# Patient Record
Sex: MALE | Race: ASIAN | NOT HISPANIC OR LATINO | URBAN - METROPOLITAN AREA
[De-identification: names, ages, dates, MRNs, and addresses within clinical notes are randomized per-mention and may not be internally consistent; named-entity substitution may affect disease eponyms.]

---

## 2017-01-01 ENCOUNTER — EMERGENCY (EMERGENCY)
Facility: HOSPITAL | Age: 52
LOS: 1 days | Discharge: HOME | End: 2017-01-01
Attending: EMERGENCY MEDICINE | Admitting: EMERGENCY MEDICINE
Payer: COMMERCIAL

## 2017-01-01 VITALS
SYSTOLIC BLOOD PRESSURE: 101 MMHG | WEIGHT: 151.9 LBS | DIASTOLIC BLOOD PRESSURE: 70 MMHG | HEIGHT: 63 IN | HEART RATE: 63 BPM | RESPIRATION RATE: 18 BRPM | OXYGEN SATURATION: 99 % | TEMPERATURE: 97 F

## 2017-01-01 DIAGNOSIS — Z98.89 OTHER SPECIFIED POSTPROCEDURAL STATES: Chronic | ICD-10-CM

## 2017-01-01 DIAGNOSIS — Z90.49 ACQUIRED ABSENCE OF OTHER SPECIFIED PARTS OF DIGESTIVE TRACT: Chronic | ICD-10-CM

## 2017-01-01 DIAGNOSIS — Z90.89 ACQUIRED ABSENCE OF OTHER ORGANS: Chronic | ICD-10-CM

## 2017-01-01 LAB
ALBUMIN SERPL ELPH-MCNC: 3.5 G/DL — SIGNIFICANT CHANGE UP (ref 3.3–5)
ALP SERPL-CCNC: 56 U/L — SIGNIFICANT CHANGE UP (ref 40–120)
ALT FLD-CCNC: 13 U/L — SIGNIFICANT CHANGE UP (ref 4–41)
AST SERPL-CCNC: 13 U/L — SIGNIFICANT CHANGE UP (ref 4–40)
BASE EXCESS BLDV CALC-SCNC: 6.6 MMOL/L — SIGNIFICANT CHANGE UP
BASOPHILS # BLD AUTO: 0.02 K/UL — SIGNIFICANT CHANGE UP (ref 0–0.2)
BASOPHILS NFR BLD AUTO: 0.3 % — SIGNIFICANT CHANGE UP (ref 0–2)
BILIRUB SERPL-MCNC: 0.2 MG/DL — SIGNIFICANT CHANGE UP (ref 0.2–1.2)
BLOOD GAS VENOUS - CREATININE: 0.62 MG/DL — SIGNIFICANT CHANGE UP (ref 0.5–1.3)
BUN SERPL-MCNC: 8 MG/DL — SIGNIFICANT CHANGE UP (ref 7–23)
CALCIUM SERPL-MCNC: 8.7 MG/DL — SIGNIFICANT CHANGE UP (ref 8.4–10.5)
CHLORIDE BLDV-SCNC: 107 MMOL/L — SIGNIFICANT CHANGE UP (ref 96–108)
CHLORIDE SERPL-SCNC: 105 MMOL/L — SIGNIFICANT CHANGE UP (ref 98–107)
CO2 SERPL-SCNC: 27 MMOL/L — SIGNIFICANT CHANGE UP (ref 22–31)
CREAT SERPL-MCNC: 0.7 MG/DL — SIGNIFICANT CHANGE UP (ref 0.5–1.3)
EOSINOPHIL # BLD AUTO: 0.09 K/UL — SIGNIFICANT CHANGE UP (ref 0–0.5)
EOSINOPHIL NFR BLD AUTO: 1.5 % — SIGNIFICANT CHANGE UP (ref 0–6)
GAS PNL BLDV: 139 MMOL/L — SIGNIFICANT CHANGE UP (ref 136–146)
GLUCOSE BLDV-MCNC: 113 — HIGH (ref 70–99)
GLUCOSE SERPL-MCNC: 122 MG/DL — HIGH (ref 70–99)
HCO3 BLDV-SCNC: 28 MMOL/L — HIGH (ref 20–27)
HCT VFR BLD CALC: 42.6 % — SIGNIFICANT CHANGE UP (ref 39–50)
HCT VFR BLDV CALC: 47.1 % — SIGNIFICANT CHANGE UP (ref 39–51)
HGB BLD-MCNC: 14.6 G/DL — SIGNIFICANT CHANGE UP (ref 13–17)
HGB BLDV-MCNC: 15.4 G/DL — SIGNIFICANT CHANGE UP (ref 13–17)
IMM GRANULOCYTES NFR BLD AUTO: 0.2 % — SIGNIFICANT CHANGE UP (ref 0–1.5)
LACTATE BLDV-MCNC: 0.8 MMOL/L — SIGNIFICANT CHANGE UP (ref 0.5–2)
LIDOCAIN IGE QN: 74.3 U/L — HIGH (ref 7–60)
LYMPHOCYTES # BLD AUTO: 2.96 K/UL — SIGNIFICANT CHANGE UP (ref 1–3.3)
LYMPHOCYTES # BLD AUTO: 48.3 % — HIGH (ref 13–44)
MCHC RBC-ENTMCNC: 28.6 PG — SIGNIFICANT CHANGE UP (ref 27–34)
MCHC RBC-ENTMCNC: 34.3 % — SIGNIFICANT CHANGE UP (ref 32–36)
MCV RBC AUTO: 83.5 FL — SIGNIFICANT CHANGE UP (ref 80–100)
MONOCYTES # BLD AUTO: 0.49 K/UL — SIGNIFICANT CHANGE UP (ref 0–0.9)
MONOCYTES NFR BLD AUTO: 8 % — SIGNIFICANT CHANGE UP (ref 2–14)
NEUTROPHILS # BLD AUTO: 2.56 K/UL — SIGNIFICANT CHANGE UP (ref 1.8–7.4)
NEUTROPHILS NFR BLD AUTO: 41.7 % — LOW (ref 43–77)
PCO2 BLDV: 55 MMHG — HIGH (ref 41–51)
PH BLDV: 7.38 PH — SIGNIFICANT CHANGE UP (ref 7.32–7.43)
PLATELET # BLD AUTO: 179 K/UL — SIGNIFICANT CHANGE UP (ref 150–400)
PMV BLD: 9.6 FL — SIGNIFICANT CHANGE UP (ref 7–13)
PO2 BLDV: 31 MMHG — LOW (ref 35–40)
POTASSIUM BLDV-SCNC: 3.5 MMOL/L — SIGNIFICANT CHANGE UP (ref 3.4–4.5)
POTASSIUM SERPL-MCNC: 3.8 MMOL/L — SIGNIFICANT CHANGE UP (ref 3.5–5.3)
POTASSIUM SERPL-SCNC: 3.8 MMOL/L — SIGNIFICANT CHANGE UP (ref 3.5–5.3)
PROT SERPL-MCNC: 6.1 G/DL — SIGNIFICANT CHANGE UP (ref 6–8.3)
RBC # BLD: 5.1 M/UL — SIGNIFICANT CHANGE UP (ref 4.2–5.8)
RBC # FLD: 13.3 % — SIGNIFICANT CHANGE UP (ref 10.3–14.5)
SAO2 % BLDV: 52.4 % — LOW (ref 60–85)
SODIUM SERPL-SCNC: 144 MMOL/L — SIGNIFICANT CHANGE UP (ref 135–145)
WBC # BLD: 6.13 K/UL — SIGNIFICANT CHANGE UP (ref 3.8–10.5)
WBC # FLD AUTO: 6.13 K/UL — SIGNIFICANT CHANGE UP (ref 3.8–10.5)

## 2017-01-01 PROCEDURE — 93010 ELECTROCARDIOGRAM REPORT: CPT

## 2017-01-01 PROCEDURE — 99285 EMERGENCY DEPT VISIT HI MDM: CPT | Mod: 25

## 2017-01-01 RX ORDER — SODIUM CHLORIDE 9 MG/ML
1000 INJECTION INTRAMUSCULAR; INTRAVENOUS; SUBCUTANEOUS ONCE
Qty: 0 | Refills: 0 | Status: COMPLETED | OUTPATIENT
Start: 2017-01-01 | End: 2017-01-01

## 2017-01-01 RX ORDER — ONDANSETRON 8 MG/1
4 TABLET, FILM COATED ORAL ONCE
Qty: 0 | Refills: 0 | Status: COMPLETED | OUTPATIENT
Start: 2017-01-01 | End: 2017-01-01

## 2017-01-01 RX ORDER — MORPHINE SULFATE 50 MG/1
4 CAPSULE, EXTENDED RELEASE ORAL ONCE
Qty: 0 | Refills: 0 | Status: DISCONTINUED | OUTPATIENT
Start: 2017-01-01 | End: 2017-01-01

## 2017-01-01 RX ORDER — HYDROMORPHONE HYDROCHLORIDE 2 MG/ML
1 INJECTION INTRAMUSCULAR; INTRAVENOUS; SUBCUTANEOUS ONCE
Qty: 0 | Refills: 0 | Status: DISCONTINUED | OUTPATIENT
Start: 2017-01-01 | End: 2017-01-01

## 2017-01-01 RX ADMIN — SODIUM CHLORIDE 1000 MILLILITER(S): 9 INJECTION INTRAMUSCULAR; INTRAVENOUS; SUBCUTANEOUS at 21:45

## 2017-01-01 RX ADMIN — ONDANSETRON 4 MILLIGRAM(S): 8 TABLET, FILM COATED ORAL at 22:10

## 2017-01-01 RX ADMIN — MORPHINE SULFATE 4 MILLIGRAM(S): 50 CAPSULE, EXTENDED RELEASE ORAL at 22:30

## 2017-01-01 RX ADMIN — MORPHINE SULFATE 4 MILLIGRAM(S): 50 CAPSULE, EXTENDED RELEASE ORAL at 23:52

## 2017-01-01 RX ADMIN — MORPHINE SULFATE 4 MILLIGRAM(S): 50 CAPSULE, EXTENDED RELEASE ORAL at 22:10

## 2017-01-01 RX ADMIN — MORPHINE SULFATE 4 MILLIGRAM(S): 50 CAPSULE, EXTENDED RELEASE ORAL at 23:25

## 2017-01-01 NOTE — ED ADULT NURSE NOTE - OBJECTIVE STATEMENT
Pt a&ox3 c/o Rt sided abdomen pain radiating across lower abdomen. Pt reports n/v for the past two days. Pt reports having back pain unrelated to abdominal pain. Pt denies sob breathing even and unlabored resp. Pt denies cp/discomfort, denies headache/dizziness. Abdomen is soft, non-tender, non-distended, denies dysuria/hematuria, having normal BM's. Skin is cool dry and intact. IV lock placed, labs drawn and sent as ordered. Report given to primary RN Lara.

## 2017-01-01 NOTE — ED ADULT TRIAGE NOTE - CHIEF COMPLAINT QUOTE
Pt st"I have stomach pains for 2 days now and vomiting also I have bad headache...and pain that comes and goes in lower back. " Hx Gastric Sleeve june 2016. DM QMA=[id number] Pt st"I have stomach pains for 2 days now and vomiting also I have bad headache...and pain that comes and goes in back. " Hx Gastric Sleeve june 2016. DM [id number]=[id number]

## 2017-01-01 NOTE — ED PROVIDER NOTE - CONDUCTED A DETAILED DISCUSSION WITH PATIENT AND/OR GUARDIAN REGARDING, MDM
need for outpatient follow-up/lab results/radiology results/return to ED if symptoms worsen, persist or questions arise

## 2017-01-01 NOTE — ED ADULT NURSE NOTE - CHIEF COMPLAINT QUOTE
Pt st"I have stomach pains for 2 days now and vomiting also I have bad headache...and pain that comes and goes in back. " Hx Gastric Sleeve june 2016. DM [id number]

## 2017-01-01 NOTE — ED ADULT TRIAGE NOTE - CCCP TRG CHIEF CMPLNT
abdominal pain/vomiting abdominal pain/epigastic back pain and vomiting abdominal pain/epigastic back pain and vomiting r/oSBO

## 2017-01-01 NOTE — ED PROVIDER NOTE - OBJECTIVE STATEMENT
51M h/o DM, obesity, gastric sleeve June 2016 (Dr Lafleur) p/w periumbilical and R-sided abdominal pain x 2 days. The pain is constant, radiates to R flank, associated with NBNB emesis worse with food. No fevers/chills, CP, SOB, cough. No dysuria or hematuria, however pt states there are "bubbles" in his urine. Last BM today, passing gas. 51M h/o DM, obesity, gastric sleeve June 2016 (Dr Lafleur) p/w periumbilical and R-sided abdominal pain x 2 days. The pain is constant, radiates to R flank, associated with NBNB emesis worse with food. No fevers/chills, CP, SOB, cough. No dysuria or hematuria. Last BM today, passing gas.

## 2017-01-01 NOTE — ED PROVIDER NOTE - ATTENDING CONTRIBUTION TO CARE
Shilo: 51 yom with DM, c/p gastric sleeve 7 months ago c/o right flank pain for 2 weeks intermittent with right sided abd pain for 2 days with nausea and vomiting, no distention, "feels like I'm carrying 15 piunds on my stomach". No cp, no sob, no fever, no diarrhea, +passing gas.  On exam, appears uncomfortable, clear lungs, nml cardiac, +BS (decreased) tn throughout right side, mild right cvat.  Discussed with surgery (bariatric) CT here and then reassess, pain meds, labs, fluids, CT.

## 2017-01-01 NOTE — ED ADULT NURSE NOTE - PMH
Asthma    Asthma  - intubated x 2  - in 1996 - admitted in 2011 for asthma - on singulair , zyflo and advair    Diabetes mellitus    DM (diabetes mellitus)    H/O: Obesity    Hypertension  pt denies but is listed on Medical clearance  Seasonal Allergies on singulair    Sleep Apnea has sleep studies in 2010  - was given CPAP machine but does not use it

## 2017-01-01 NOTE — ED PROVIDER NOTE - PLAN OF CARE
-- Follow up with Dr Lafleur in 1-2 days. Call today to make an appointment. Bring a copy of your lab and imaging results from today.  -- Take tylenol or motrin as needed for mild to moderate pain.  Take oxycodone 1 tablet every 4-6 hours as needed for severe pain not responsive to tylenol or motrin. Do not drink, drive, operate machinery, climb ladders or perform any other high risk activities after taking oxycodone.   -- Return to ER immediately for new or worsening symptoms (including but not limited to: worsening pain, vomiting, inability to eat and drink, inability to pass gas or have a bowel movement) or for any concerns. -- Follow up with Dr Lafleur in 1-2 days. Call today to make an appointment. Bring a copy of your lab and imaging results from today.  -- Take tylenol as needed for mild to moderate pain.  Take oxycodone 1 tablet every 4-6 hours as needed for severe pain not responsive to tylenol. Do not drink, drive, operate machinery, climb ladders or perform any other high risk activities after taking oxycodone.   -- Return to ER immediately for new or worsening symptoms (including but not limited to: worsening pain, vomiting, inability to eat and drink, inability to pass gas or have a bowel movement) or for any concerns.

## 2017-01-01 NOTE — ED PROVIDER NOTE - CARE PLAN
Principal Discharge DX:	Abdominal pain  Instructions for follow-up, activity and diet:	-- Follow up with Dr Lafleur in 1-2 days. Call today to make an appointment. Bring a copy of your lab and imaging results from today.  -- Take tylenol or motrin as needed for mild to moderate pain.  Take oxycodone 1 tablet every 4-6 hours as needed for severe pain not responsive to tylenol or motrin. Do not drink, drive, operate machinery, climb ladders or perform any other high risk activities after taking oxycodone.   -- Return to ER immediately for new or worsening symptoms (including but not limited to: worsening pain, vomiting, inability to eat and drink, inability to pass gas or have a bowel movement) or for any concerns. Principal Discharge DX:	Abdominal pain  Instructions for follow-up, activity and diet:	-- Follow up with Dr Lafleur in 1-2 days. Call today to make an appointment. Bring a copy of your lab and imaging results from today.  -- Take tylenol as needed for mild to moderate pain.  Take oxycodone 1 tablet every 4-6 hours as needed for severe pain not responsive to tylenol. Do not drink, drive, operate machinery, climb ladders or perform any other high risk activities after taking oxycodone.   -- Return to ER immediately for new or worsening symptoms (including but not limited to: worsening pain, vomiting, inability to eat and drink, inability to pass gas or have a bowel movement) or for any concerns.

## 2017-01-01 NOTE — ED PROVIDER NOTE - PROGRESS NOTE DETAILS
Pt with pruritus, will give benadryl. CTAP neg for acute process or bariatric complication. Discussed with surgery. Will obtain RUQ US, reassess. RUQ US negative. Spoke to surgery resident, who discussed plan with pt's bariatric surgeon (at [place]) around 2am. As per Dr Lafleur via surgery resident, pt can be discharged to follow up in office if ultrasound negative. Discussed with pt, who agrees with plan. Gave strict return precautions. Pt feeling better, tolerating PO, pain improved.

## 2017-01-01 NOTE — ED PROVIDER NOTE - MEDICAL DECISION MAKING DETAILS
51M h/o gastric sleeve 6 months ago p/w abdominal pain and vomiting. Concern for bariatric complication, SBO, pancreatitis, kwasi. Plan: labs, fluids, pain control, CTAP, bariatric consult.

## 2017-01-01 NOTE — ED PROVIDER NOTE - PSH
h/o  Sinus Surgery - amny yrs ago and septoplasty    History of Appendectomy    Inguinal Hernia right - many yrs ago    S/P appendectomy    S/P hernia repair    S/P tonsillectomy

## 2017-01-02 VITALS
RESPIRATION RATE: 20 BRPM | HEART RATE: 80 BPM | DIASTOLIC BLOOD PRESSURE: 75 MMHG | SYSTOLIC BLOOD PRESSURE: 101 MMHG | OXYGEN SATURATION: 96 %

## 2017-01-02 PROCEDURE — 76705 ECHO EXAM OF ABDOMEN: CPT | Mod: 26

## 2017-01-02 PROCEDURE — 74177 CT ABD & PELVIS W/CONTRAST: CPT | Mod: 26

## 2017-01-02 RX ORDER — HYDROMORPHONE HYDROCHLORIDE 2 MG/ML
0.5 INJECTION INTRAMUSCULAR; INTRAVENOUS; SUBCUTANEOUS ONCE
Qty: 0 | Refills: 0 | Status: DISCONTINUED | OUTPATIENT
Start: 2017-01-02 | End: 2017-01-02

## 2017-01-02 RX ORDER — DIPHENHYDRAMINE HCL 50 MG
25 CAPSULE ORAL ONCE
Qty: 0 | Refills: 0 | Status: COMPLETED | OUTPATIENT
Start: 2017-01-02 | End: 2017-01-02

## 2017-01-02 RX ORDER — ONDANSETRON 8 MG/1
4 TABLET, FILM COATED ORAL ONCE
Qty: 0 | Refills: 0 | Status: COMPLETED | OUTPATIENT
Start: 2017-01-02 | End: 2017-01-02

## 2017-01-02 RX ORDER — OXYCODONE HYDROCHLORIDE 5 MG/1
1 TABLET ORAL
Qty: 6 | Refills: 0 | OUTPATIENT
Start: 2017-01-02

## 2017-01-02 RX ADMIN — HYDROMORPHONE HYDROCHLORIDE 0.5 MILLIGRAM(S): 2 INJECTION INTRAMUSCULAR; INTRAVENOUS; SUBCUTANEOUS at 03:16

## 2017-01-02 RX ADMIN — HYDROMORPHONE HYDROCHLORIDE 1 MILLIGRAM(S): 2 INJECTION INTRAMUSCULAR; INTRAVENOUS; SUBCUTANEOUS at 00:01

## 2017-01-02 RX ADMIN — ONDANSETRON 4 MILLIGRAM(S): 8 TABLET, FILM COATED ORAL at 00:27

## 2017-01-02 RX ADMIN — HYDROMORPHONE HYDROCHLORIDE 1 MILLIGRAM(S): 2 INJECTION INTRAMUSCULAR; INTRAVENOUS; SUBCUTANEOUS at 00:28

## 2017-01-02 RX ADMIN — Medication 25 MILLIGRAM(S): at 02:46

## 2017-01-02 RX ADMIN — Medication 25 MILLIGRAM(S): at 01:18

## 2017-01-02 RX ADMIN — HYDROMORPHONE HYDROCHLORIDE 0.5 MILLIGRAM(S): 2 INJECTION INTRAMUSCULAR; INTRAVENOUS; SUBCUTANEOUS at 03:40

## 2017-01-02 NOTE — ED ADULT NURSE REASSESSMENT NOTE - CONDITION
Pt. c/o itchiness. IV fell out. Restarted in left A/F #20 S/L. Benadryl 25mg given as ordered. c/o abdominal pain.

## 2017-01-02 NOTE — ED ADULT NURSE REASSESSMENT NOTE - NS ED NURSE REASSESS COMMENT FT1
returned from ct scan. states started to feel itchy after was given Iv contrast. no hives to body. denies chest pain, sob, throat closing, or other symptoms. MD Knott made aware. given benadryl as per order.

## 2017-02-15 ENCOUNTER — APPOINTMENT (OUTPATIENT)
Dept: PULMONOLOGY | Facility: CLINIC | Age: 52
End: 2017-02-15

## 2017-04-07 ENCOUNTER — EMERGENCY (EMERGENCY)
Facility: HOSPITAL | Age: 52
LOS: 1 days | Discharge: HOME | End: 2017-04-07
Attending: EMERGENCY MEDICINE | Admitting: EMERGENCY MEDICINE
Payer: COMMERCIAL

## 2017-04-07 VITALS — DIASTOLIC BLOOD PRESSURE: 65 MMHG | SYSTOLIC BLOOD PRESSURE: 102 MMHG

## 2017-04-07 VITALS
DIASTOLIC BLOOD PRESSURE: 73 MMHG | OXYGEN SATURATION: 99 % | RESPIRATION RATE: 16 BRPM | HEART RATE: 75 BPM | SYSTOLIC BLOOD PRESSURE: 114 MMHG | TEMPERATURE: 99 F

## 2017-04-07 DIAGNOSIS — Z98.89 OTHER SPECIFIED POSTPROCEDURAL STATES: Chronic | ICD-10-CM

## 2017-04-07 DIAGNOSIS — Z90.49 ACQUIRED ABSENCE OF OTHER SPECIFIED PARTS OF DIGESTIVE TRACT: Chronic | ICD-10-CM

## 2017-04-07 DIAGNOSIS — Z90.89 ACQUIRED ABSENCE OF OTHER ORGANS: Chronic | ICD-10-CM

## 2017-04-07 LAB
ALBUMIN SERPL ELPH-MCNC: 3.4 G/DL — SIGNIFICANT CHANGE UP (ref 3.3–5)
ALP SERPL-CCNC: 56 U/L — SIGNIFICANT CHANGE UP (ref 40–120)
ALT FLD-CCNC: 20 U/L — SIGNIFICANT CHANGE UP (ref 4–41)
AST SERPL-CCNC: 17 U/L — SIGNIFICANT CHANGE UP (ref 4–40)
BASOPHILS # BLD AUTO: 0.03 K/UL — SIGNIFICANT CHANGE UP (ref 0–0.2)
BASOPHILS NFR BLD AUTO: 0.5 % — SIGNIFICANT CHANGE UP (ref 0–2)
BILIRUB SERPL-MCNC: 0.3 MG/DL — SIGNIFICANT CHANGE UP (ref 0.2–1.2)
BUN SERPL-MCNC: 7 MG/DL — SIGNIFICANT CHANGE UP (ref 7–23)
CALCIUM SERPL-MCNC: 8.9 MG/DL — SIGNIFICANT CHANGE UP (ref 8.4–10.5)
CHLORIDE SERPL-SCNC: 102 MMOL/L — SIGNIFICANT CHANGE UP (ref 98–107)
CK MB BLD-MCNC: 1.32 NG/ML — SIGNIFICANT CHANGE UP (ref 1–6.6)
CK MB BLD-MCNC: SIGNIFICANT CHANGE UP (ref 0–2.5)
CK SERPL-CCNC: 96 U/L — SIGNIFICANT CHANGE UP (ref 30–200)
CO2 SERPL-SCNC: 26 MMOL/L — SIGNIFICANT CHANGE UP (ref 22–31)
CREAT SERPL-MCNC: 0.58 MG/DL — SIGNIFICANT CHANGE UP (ref 0.5–1.3)
EOSINOPHIL # BLD AUTO: 0.12 K/UL — SIGNIFICANT CHANGE UP (ref 0–0.5)
EOSINOPHIL NFR BLD AUTO: 2.1 % — SIGNIFICANT CHANGE UP (ref 0–6)
GLUCOSE SERPL-MCNC: 84 MG/DL — SIGNIFICANT CHANGE UP (ref 70–99)
HCT VFR BLD CALC: 40.9 % — SIGNIFICANT CHANGE UP (ref 39–50)
HGB BLD-MCNC: 14.1 G/DL — SIGNIFICANT CHANGE UP (ref 13–17)
IMM GRANULOCYTES NFR BLD AUTO: 0.2 % — SIGNIFICANT CHANGE UP (ref 0–1.5)
LYMPHOCYTES # BLD AUTO: 3.11 K/UL — SIGNIFICANT CHANGE UP (ref 1–3.3)
LYMPHOCYTES # BLD AUTO: 54.4 % — HIGH (ref 13–44)
MCHC RBC-ENTMCNC: 28.8 PG — SIGNIFICANT CHANGE UP (ref 27–34)
MCHC RBC-ENTMCNC: 34.5 % — SIGNIFICANT CHANGE UP (ref 32–36)
MCV RBC AUTO: 83.5 FL — SIGNIFICANT CHANGE UP (ref 80–100)
MONOCYTES # BLD AUTO: 0.43 K/UL — SIGNIFICANT CHANGE UP (ref 0–0.9)
MONOCYTES NFR BLD AUTO: 7.5 % — SIGNIFICANT CHANGE UP (ref 2–14)
NEUTROPHILS # BLD AUTO: 2.02 K/UL — SIGNIFICANT CHANGE UP (ref 1.8–7.4)
NEUTROPHILS NFR BLD AUTO: 35.3 % — LOW (ref 43–77)
PLATELET # BLD AUTO: 169 K/UL — SIGNIFICANT CHANGE UP (ref 150–400)
PMV BLD: 9.7 FL — SIGNIFICANT CHANGE UP (ref 7–13)
POTASSIUM SERPL-MCNC: 3.6 MMOL/L — SIGNIFICANT CHANGE UP (ref 3.5–5.3)
POTASSIUM SERPL-SCNC: 3.6 MMOL/L — SIGNIFICANT CHANGE UP (ref 3.5–5.3)
PROT SERPL-MCNC: 6 G/DL — SIGNIFICANT CHANGE UP (ref 6–8.3)
RBC # BLD: 4.9 M/UL — SIGNIFICANT CHANGE UP (ref 4.2–5.8)
RBC # FLD: 13.3 % — SIGNIFICANT CHANGE UP (ref 10.3–14.5)
SODIUM SERPL-SCNC: 143 MMOL/L — SIGNIFICANT CHANGE UP (ref 135–145)
TROPONIN T SERPL-MCNC: < 0.06 NG/ML — SIGNIFICANT CHANGE UP (ref 0–0.06)
WBC # BLD: 5.72 K/UL — SIGNIFICANT CHANGE UP (ref 3.8–10.5)
WBC # FLD AUTO: 5.72 K/UL — SIGNIFICANT CHANGE UP (ref 3.8–10.5)

## 2017-04-07 PROCEDURE — 99285 EMERGENCY DEPT VISIT HI MDM: CPT

## 2017-04-07 RX ORDER — FAMOTIDINE 10 MG/ML
20 INJECTION INTRAVENOUS ONCE
Qty: 0 | Refills: 0 | Status: COMPLETED | OUTPATIENT
Start: 2017-04-07 | End: 2017-04-07

## 2017-04-07 RX ORDER — SODIUM CHLORIDE 9 MG/ML
1000 INJECTION INTRAMUSCULAR; INTRAVENOUS; SUBCUTANEOUS ONCE
Qty: 0 | Refills: 0 | Status: COMPLETED | OUTPATIENT
Start: 2017-04-07 | End: 2017-04-07

## 2017-04-07 RX ORDER — METOCLOPRAMIDE HCL 10 MG
10 TABLET ORAL ONCE
Qty: 0 | Refills: 0 | Status: COMPLETED | OUTPATIENT
Start: 2017-04-07 | End: 2017-04-07

## 2017-04-07 RX ADMIN — FAMOTIDINE 20 MILLIGRAM(S): 10 INJECTION INTRAVENOUS at 22:53

## 2017-04-07 RX ADMIN — SODIUM CHLORIDE 2000 MILLILITER(S): 9 INJECTION INTRAMUSCULAR; INTRAVENOUS; SUBCUTANEOUS at 22:54

## 2017-04-07 RX ADMIN — Medication 10 MILLIGRAM(S): at 22:54

## 2017-04-07 NOTE — ED ADULT TRIAGE NOTE - CHIEF COMPLAINT QUOTE
brought in by EMS from home for c/o left sided chest pain x 2 days with feelings of spinning of the room. C/o nausea/dry heaving x 2 hrs. Hx DM, asthma brought in by EMS from home for c/o left sided chest pain x 2 days with feelings of spinning of the room. C/o nausea/dry heaving x 2 hrs. Hx DM, asthma. FS=77

## 2017-04-07 NOTE — ED ADULT NURSE NOTE - OBJECTIVE STATEMENT
Patient a&ox4, ambulatory, complaining of headache followed by left sided chest discomfort radiating down arm and leg with numbness. Patient states s/s x 2 day. Patient noted to have general malaise upon assessment.  EKG done at triage, NSR on cardiac monitor. IV placed, labs drawn and sent to lab. Will continue to monitor.

## 2017-04-07 NOTE — ED ADULT NURSE NOTE - CHIEF COMPLAINT QUOTE
brought in by EMS from home for c/o left sided chest pain x 2 days with feelings of spinning of the room. C/o nausea/dry heaving x 2 hrs. Hx DM, asthma. FS=77

## 2017-04-07 NOTE — ED PROVIDER NOTE - MEDICAL DECISION MAKING DETAILS
Attending Note (Sarah): patient with headache and paresthesias.  concern for complex migraine.  will treat symptomatically.  cxr pending.

## 2017-04-07 NOTE — ED PROVIDER NOTE - OBJECTIVE STATEMENT
52yom w/ DM2, GERD p/w 2 days of headache and chest pain. Pt states he had a gradual onset of a bilateral frontal, aching headache with associated photophobia, unrelieved by tylenol. Pt also having left sided chest pressure radiating to the left arm associated with tingling in the left hand. 52yom w/ DM2, GERD p/w 2 days of headache and chest pain. Pt states he had a gradual onset of a bilateral frontal, aching headache with associated photophobia, unrelieved by tylenol. No history of headaches. Denies any focal weakness, facial droop, changes in speech. Has had nausea and dry-heaving since earlier today. Pt also having left sided chest pressure radiating to the left arm associated with tingling in the left hand. Chest pain has been intermittent. Worsens with inspiration and movement. Has never had similar pain. No hx of CAD. No family hx CAD. Lifetime non-smoker.

## 2017-04-07 NOTE — ED PROVIDER NOTE - PROGRESS NOTE DETAILS
Attending Note (Sarah): patient feels better, well appearing. Attending Note (Sarah): cxr on ed read negative for acute pathology

## 2017-04-08 PROCEDURE — 71020: CPT | Mod: 26

## 2017-04-08 NOTE — ED ADULT NURSE REASSESSMENT NOTE - NS ED NURSE REASSESS COMMENT FT1
Report received from OTONIEL Garvey at 12:35PM. Patient discharged by MD prior to nurse reelevation. Iv discontinued by attending. Discharge instructions were provided. Kerry FREDERICK

## 2018-05-10 ENCOUNTER — EMERGENCY (EMERGENCY)
Facility: HOSPITAL | Age: 53
LOS: 1 days | Discharge: HOME | End: 2018-05-10
Attending: EMERGENCY MEDICINE | Admitting: EMERGENCY MEDICINE
Payer: COMMERCIAL

## 2018-05-10 VITALS
SYSTOLIC BLOOD PRESSURE: 102 MMHG | TEMPERATURE: 98 F | DIASTOLIC BLOOD PRESSURE: 61 MMHG | OXYGEN SATURATION: 100 % | HEART RATE: 90 BPM | RESPIRATION RATE: 20 BRPM

## 2018-05-10 DIAGNOSIS — Z90.49 ACQUIRED ABSENCE OF OTHER SPECIFIED PARTS OF DIGESTIVE TRACT: Chronic | ICD-10-CM

## 2018-05-10 DIAGNOSIS — Z90.89 ACQUIRED ABSENCE OF OTHER ORGANS: Chronic | ICD-10-CM

## 2018-05-10 DIAGNOSIS — Z98.89 OTHER SPECIFIED POSTPROCEDURAL STATES: Chronic | ICD-10-CM

## 2018-05-10 LAB
ALBUMIN SERPL ELPH-MCNC: 3.7 G/DL — SIGNIFICANT CHANGE UP (ref 3.3–5)
ALP SERPL-CCNC: 79 U/L — SIGNIFICANT CHANGE UP (ref 40–120)
ALT FLD-CCNC: 18 U/L — SIGNIFICANT CHANGE UP (ref 4–41)
AST SERPL-CCNC: 21 U/L — SIGNIFICANT CHANGE UP (ref 4–40)
BASE EXCESS BLDV CALC-SCNC: 5.3 MMOL/L — SIGNIFICANT CHANGE UP
BASOPHILS # BLD AUTO: 0.03 K/UL — SIGNIFICANT CHANGE UP (ref 0–0.2)
BASOPHILS NFR BLD AUTO: 0.5 % — SIGNIFICANT CHANGE UP (ref 0–2)
BILIRUB SERPL-MCNC: 0.3 MG/DL — SIGNIFICANT CHANGE UP (ref 0.2–1.2)
BLOOD GAS VENOUS - CREATININE: 0.49 MG/DL — LOW (ref 0.5–1.3)
BUN SERPL-MCNC: 9 MG/DL — SIGNIFICANT CHANGE UP (ref 7–23)
CALCIUM SERPL-MCNC: 8.5 MG/DL — SIGNIFICANT CHANGE UP (ref 8.4–10.5)
CHLORIDE BLDV-SCNC: 105 MMOL/L — SIGNIFICANT CHANGE UP (ref 96–108)
CHLORIDE SERPL-SCNC: 99 MMOL/L — SIGNIFICANT CHANGE UP (ref 98–107)
CO2 SERPL-SCNC: 27 MMOL/L — SIGNIFICANT CHANGE UP (ref 22–31)
CREAT SERPL-MCNC: 0.67 MG/DL — SIGNIFICANT CHANGE UP (ref 0.5–1.3)
EOSINOPHIL # BLD AUTO: 0.14 K/UL — SIGNIFICANT CHANGE UP (ref 0–0.5)
EOSINOPHIL NFR BLD AUTO: 2.6 % — SIGNIFICANT CHANGE UP (ref 0–6)
GAS PNL BLDV: 137 MMOL/L — SIGNIFICANT CHANGE UP (ref 136–146)
GLUCOSE BLDV-MCNC: 164 — HIGH (ref 70–99)
GLUCOSE SERPL-MCNC: 166 MG/DL — HIGH (ref 70–99)
HCO3 BLDV-SCNC: 27 MMOL/L — SIGNIFICANT CHANGE UP (ref 20–27)
HCT VFR BLD CALC: 46.2 % — SIGNIFICANT CHANGE UP (ref 39–50)
HCT VFR BLDV CALC: 49.9 % — SIGNIFICANT CHANGE UP (ref 39–51)
HGB BLD-MCNC: 15.6 G/DL — SIGNIFICANT CHANGE UP (ref 13–17)
HGB BLDV-MCNC: 16.3 G/DL — SIGNIFICANT CHANGE UP (ref 13–17)
IMM GRANULOCYTES # BLD AUTO: 0.01 # — SIGNIFICANT CHANGE UP
IMM GRANULOCYTES NFR BLD AUTO: 0.2 % — SIGNIFICANT CHANGE UP (ref 0–1.5)
LACTATE BLDV-MCNC: 2.9 MMOL/L — HIGH (ref 0.5–2)
LYMPHOCYTES # BLD AUTO: 2.38 K/UL — SIGNIFICANT CHANGE UP (ref 1–3.3)
LYMPHOCYTES # BLD AUTO: 43.4 % — SIGNIFICANT CHANGE UP (ref 13–44)
MCHC RBC-ENTMCNC: 28.6 PG — SIGNIFICANT CHANGE UP (ref 27–34)
MCHC RBC-ENTMCNC: 33.8 % — SIGNIFICANT CHANGE UP (ref 32–36)
MCV RBC AUTO: 84.6 FL — SIGNIFICANT CHANGE UP (ref 80–100)
MONOCYTES # BLD AUTO: 0.55 K/UL — SIGNIFICANT CHANGE UP (ref 0–0.9)
MONOCYTES NFR BLD AUTO: 10 % — SIGNIFICANT CHANGE UP (ref 2–14)
NEUTROPHILS # BLD AUTO: 2.37 K/UL — SIGNIFICANT CHANGE UP (ref 1.8–7.4)
NEUTROPHILS NFR BLD AUTO: 43.3 % — SIGNIFICANT CHANGE UP (ref 43–77)
NRBC # FLD: 0 — SIGNIFICANT CHANGE UP
PCO2 BLDV: 49 MMHG — SIGNIFICANT CHANGE UP (ref 41–51)
PH BLDV: 7.4 PH — SIGNIFICANT CHANGE UP (ref 7.32–7.43)
PLATELET # BLD AUTO: 175 K/UL — SIGNIFICANT CHANGE UP (ref 150–400)
PMV BLD: 9.9 FL — SIGNIFICANT CHANGE UP (ref 7–13)
PO2 BLDV: 26 MMHG — LOW (ref 35–40)
POTASSIUM BLDV-SCNC: 3.8 MMOL/L — SIGNIFICANT CHANGE UP (ref 3.4–4.5)
POTASSIUM SERPL-MCNC: 3.9 MMOL/L — SIGNIFICANT CHANGE UP (ref 3.5–5.3)
POTASSIUM SERPL-SCNC: 3.9 MMOL/L — SIGNIFICANT CHANGE UP (ref 3.5–5.3)
PROT SERPL-MCNC: 7 G/DL — SIGNIFICANT CHANGE UP (ref 6–8.3)
RBC # BLD: 5.46 M/UL — SIGNIFICANT CHANGE UP (ref 4.2–5.8)
RBC # FLD: 13 % — SIGNIFICANT CHANGE UP (ref 10.3–14.5)
SAO2 % BLDV: 45.5 % — LOW (ref 60–85)
SODIUM SERPL-SCNC: 139 MMOL/L — SIGNIFICANT CHANGE UP (ref 135–145)
WBC # BLD: 5.48 K/UL — SIGNIFICANT CHANGE UP (ref 3.8–10.5)
WBC # FLD AUTO: 5.48 K/UL — SIGNIFICANT CHANGE UP (ref 3.8–10.5)

## 2018-05-10 PROCEDURE — 99285 EMERGENCY DEPT VISIT HI MDM: CPT

## 2018-05-10 PROCEDURE — 71046 X-RAY EXAM CHEST 2 VIEWS: CPT | Mod: 26

## 2018-05-10 RX ORDER — IPRATROPIUM/ALBUTEROL SULFATE 18-103MCG
3 AEROSOL WITH ADAPTER (GRAM) INHALATION ONCE
Qty: 0 | Refills: 0 | Status: COMPLETED | OUTPATIENT
Start: 2018-05-10 | End: 2018-05-10

## 2018-05-10 RX ORDER — MAGNESIUM SULFATE 500 MG/ML
2 VIAL (ML) INJECTION ONCE
Qty: 0 | Refills: 0 | Status: COMPLETED | OUTPATIENT
Start: 2018-05-10 | End: 2018-05-10

## 2018-05-10 RX ORDER — SODIUM CHLORIDE 9 MG/ML
1000 INJECTION INTRAMUSCULAR; INTRAVENOUS; SUBCUTANEOUS ONCE
Qty: 0 | Refills: 0 | Status: COMPLETED | OUTPATIENT
Start: 2018-05-10 | End: 2018-05-10

## 2018-05-10 RX ORDER — KETOROLAC TROMETHAMINE 30 MG/ML
15 SYRINGE (ML) INJECTION ONCE
Qty: 0 | Refills: 0 | Status: DISCONTINUED | OUTPATIENT
Start: 2018-05-10 | End: 2018-05-10

## 2018-05-10 RX ADMIN — Medication 3 MILLILITER(S): at 22:21

## 2018-05-10 RX ADMIN — SODIUM CHLORIDE 1000 MILLILITER(S): 9 INJECTION INTRAMUSCULAR; INTRAVENOUS; SUBCUTANEOUS at 23:21

## 2018-05-10 RX ADMIN — Medication 15 MILLIGRAM(S): at 23:20

## 2018-05-10 NOTE — ED ADULT NURSE NOTE - CHIEF COMPLAINT QUOTE
Pt st" 2 days ago my asthma started to bother me.....today my chest started to hurt....  I gave self nebulizers and pump not helping I am coughing a lot and have runny nose." + ollie insp wheezing, pt having diff talking,

## 2018-05-10 NOTE — ED ADULT NURSE NOTE - OBJECTIVE STATEMENT
pt arrives w/ c/o sob. runny nose, cough, headache, chest pain with numbness to left arm. pt states he was here two days ago with wife and thinks he picked up something. pt states chest pain and numbness happened before in the past but never had a stress test done or echo. pt states he also had flare up of asthma and his inhalers were not working. pt received 3 treatments in triage and states feels a little better no audible wheezes heard. pt placed on cardiac monitor NS rhythm notes resp even and unlabored,. IV accessed 20 gauge left arm labs sent. will make primary RN Tara aware of status.

## 2018-05-10 NOTE — ED ADULT TRIAGE NOTE - CHIEF COMPLAINT QUOTE
Pt st" 2 days ago my asthma started to bother me.....today my chest started to hurt....  I gave self nebulizers and pump not helping I am coughing a lot and have runny nose." Pt st" 2 days ago my asthma started to bother me.....today my chest started to hurt....  I gave self nebulizers and pump not helping I am coughing a lot and have runny nose." + ollie insp wheezing, pt having diff talking,

## 2018-05-10 NOTE — ED PROVIDER NOTE - ATTENDING CONTRIBUTION TO CARE
agree with resident note  53M h/o asthma, DM, HTN p/w sharp L-sided CP radiating to back of head and decreased sensation down L arm since 2PM today.  No prior cardiac workup.  States feels like her asthma.      PE: well appearing; mild resp distress; expiratory wheezing; s1 s2 no m/r/g abd soft/NT/ND ext: no edema

## 2018-05-10 NOTE — ED PROVIDER NOTE - OBJECTIVE STATEMENT
53M h/o asthma, DM, HTN p/w sharp L-sided CP radiating to back of head and decreased sensation down L arm since 2PM today. Endorses nasal congestion and SOB that feels like his asthma. +Dry cough. Denies fever, travel, leg pain, leg swelling, vomiting, diaphoresis.     Never had stress/echo. 53M h/o asthma, DM, HTN p/w sharp L-sided CP radiating to back of head and decreased sensation down L arm since 2PM today. Endorses nasal congestion and SOB that feels like his asthma. +Dry cough for 2 days. + nausea. Took 3 duonebs in triage. Denies fever, travel, leg pain, leg swelling, vomiting, diaphoresis.     Never had stress/echo.

## 2018-05-11 VITALS
OXYGEN SATURATION: 100 % | SYSTOLIC BLOOD PRESSURE: 121 MMHG | DIASTOLIC BLOOD PRESSURE: 69 MMHG | TEMPERATURE: 98 F | HEART RATE: 95 BPM | RESPIRATION RATE: 18 BRPM

## 2018-05-11 LAB
BASE EXCESS BLDV CALC-SCNC: 2.1 MMOL/L — SIGNIFICANT CHANGE UP
BLOOD GAS VENOUS - CREATININE: 0.47 MG/DL — LOW (ref 0.5–1.3)
CHLORIDE BLDV-SCNC: 107 MMOL/L — SIGNIFICANT CHANGE UP (ref 96–108)
GAS PNL BLDV: 133 MMOL/L — LOW (ref 136–146)
GLUCOSE BLDV-MCNC: 214 — HIGH (ref 70–99)
HBA1C BLD-MCNC: 6.1 % — HIGH (ref 4–5.6)
HCO3 BLDV-SCNC: 26 MMOL/L — SIGNIFICANT CHANGE UP (ref 20–27)
HCT VFR BLDV CALC: 46.1 % — SIGNIFICANT CHANGE UP (ref 39–51)
HGB BLDV-MCNC: 15.1 G/DL — SIGNIFICANT CHANGE UP (ref 13–17)
LACTATE BLDV-MCNC: 1.6 MMOL/L — SIGNIFICANT CHANGE UP (ref 0.5–2)
PCO2 BLDV: 48 MMHG — SIGNIFICANT CHANGE UP (ref 41–51)
PH BLDV: 7.37 PH — SIGNIFICANT CHANGE UP (ref 7.32–7.43)
PO2 BLDV: 63 MMHG — HIGH (ref 35–40)
POTASSIUM BLDV-SCNC: 4 MMOL/L — SIGNIFICANT CHANGE UP (ref 3.4–4.5)
SAO2 % BLDV: 91.6 % — HIGH (ref 60–85)
TROPONIN T SERPL-MCNC: < 0.06 NG/ML — SIGNIFICANT CHANGE UP (ref 0–0.06)
TROPONIN T SERPL-MCNC: < 0.06 NG/ML — SIGNIFICANT CHANGE UP (ref 0–0.06)

## 2018-05-11 PROCEDURE — 99236 HOSP IP/OBS SAME DATE HI 85: CPT

## 2018-05-11 RX ORDER — ONDANSETRON 8 MG/1
4 TABLET, FILM COATED ORAL ONCE
Qty: 0 | Refills: 0 | Status: COMPLETED | OUTPATIENT
Start: 2018-05-11 | End: 2018-05-11

## 2018-05-11 RX ORDER — ALBUTEROL 90 UG/1
2.5 AEROSOL, METERED ORAL ONCE
Qty: 0 | Refills: 0 | Status: COMPLETED | OUTPATIENT
Start: 2018-05-11 | End: 2018-05-11

## 2018-05-11 RX ORDER — IPRATROPIUM/ALBUTEROL SULFATE 18-103MCG
3 AEROSOL WITH ADAPTER (GRAM) INHALATION EVERY 4 HOURS
Qty: 0 | Refills: 0 | Status: DISCONTINUED | OUTPATIENT
Start: 2018-05-11 | End: 2018-05-14

## 2018-05-11 RX ADMIN — Medication 50 GRAM(S): at 00:04

## 2018-05-11 RX ADMIN — Medication 50 MILLIGRAM(S): at 00:15

## 2018-05-11 RX ADMIN — ONDANSETRON 4 MILLIGRAM(S): 8 TABLET, FILM COATED ORAL at 04:18

## 2018-05-11 RX ADMIN — Medication 15 MILLIGRAM(S): at 00:04

## 2018-05-11 RX ADMIN — Medication 3 MILLILITER(S): at 05:49

## 2018-05-11 RX ADMIN — Medication 3 MILLILITER(S): at 08:47

## 2018-05-11 RX ADMIN — Medication 100 MILLIGRAM(S): at 08:47

## 2018-05-11 NOTE — ED CDU PROVIDER INITIAL DAY NOTE - OBJECTIVE STATEMENT
53M h/o asthma, DM, HTN p/w sharp L-sided CP radiating to back of head and decreased sensation down L arm since 2PM today. Endorses nasal congestion and SOB that feels like his asthma. +Dry cough for 2 days. + nausea. Took 3 duonebs in triage. Denies fever, travel, leg pain, leg swelling, vomiting, diaphoresis.     Never had stress/echo.  CDU SUSANNE Abarca: 53 yr old male with hx of asthma, obesity, DM, HTN ( no longer on meds after losing wt) presents with difficulty breathing, sob. Pt reports taking his wife to ED 2 days ago for kidney stone, and since then he has nasal congestion, dry cough, generalized headache, and sob. Pt also reports chest pain, mostly when coughing. In ED, ekg nsr, chest showing no consolidation, treated as asthma exacerbation/ URI. Pt sent to CDU to continue duo nebs and monitor for improvement.   Pt reports he has been admitted and intubated for asthma, last intubated was in 2011 ( when he was obese as per pt) and last admission over 1 year ago.

## 2018-05-11 NOTE — ED CDU PROVIDER DISPOSITION NOTE - CLINICAL COURSE
Pt is a 54 y/o male with PMHx of DM, HTN, and Asthma with 2 previous intubations, last in 2011 prior to gastric sleeve. Pt presented to the ED for difficulty breathing over the past 2 days with assocated non-productive cough with some chest tightness. Pt received albuterol. mag, and steroids. Pt sent to OBS for monitoring and repeat nebs and steroids as well as trend trop. Pt improved in symptoms and noted feeling better. Wheezing had resolved. Lungs CTA b/l. RRR. Abd soft, non-tender. Sent home with steroids.

## 2018-05-11 NOTE — ED CDU PROVIDER INITIAL DAY NOTE - MEDICAL DECISION MAKING DETAILS
53 yr old male with hx of asthma, obesity, DM, HTN ( no longer on meds after losing wt) presents with difficulty breathing, sob. Pt reports taking his wife to ED 2 days ago for kidney stone, and since then he has nasal congestion, dry cough, generalized headache, and sob. Pt also reports chest pain, mostly when coughing. In ED, ekg nsr, chest showing no consolidation, treated as asthma exacerbation/ URI. Pt sent to CDU to continue duo nebs and monitor for improvement.

## 2018-05-11 NOTE — ED CDU PROVIDER INITIAL DAY NOTE - ATTENDING CONTRIBUTION TO CARE
Pt was seen and evaluated by me. Pt presented to the ED for difficulty breathing with history of Asthma with 2 previous intubations, last in 2011 prior to gastric sleeve. Pt states over the past 2 days having increased SOB and cough with some chest tightness. Pt states feeling better after mag and steroids. Mild wheezing still noted on expiration. RRR. Abd soft, non-tender. Will continue nebs, steroids, and add tessalon pearls.

## 2018-05-11 NOTE — ED CDU PROVIDER INITIAL DAY NOTE - PROGRESS NOTE DETAILS
Received sign out from SUSANNE Campos. pt seen and examined by Dr. Bassett and I. pt with hx of obesity and intubation 8 years ago secondary to asthma (no complications ever since after losing weight), presents to cdu for asthma exacerbation. states feeling much better, denies any cp or sob, fever, chills. exam: NAD, heart rrr, b/l diffuse expiratory wheezing. no LE edema, no calf tenderness b/l. abd soft, NT, ND, no rebound or guarding. will continue duonebs, steroids and reassess. pt feeling much better, medications helped with cough. states would like to go home. exam: NAD. well appearing. lungs clear b/l no wheezing or intercostal retractions. amenable for dc home, states has nebs and inhaler at home.

## 2018-05-11 NOTE — ED CDU PROVIDER DISPOSITION NOTE - ATTENDING CONTRIBUTION TO CARE
Pt was seen and evaluated by me. Pt has history of DM, HTN, and Asthma with 2 previous intubations, last in 2011 prior to gastric sleeve. Pt presented to the ED for difficulty breathing over the past 2 days with assocated non-productive cough with some chest tightness. Pt received albuterol. mag, and steroids. Pt was sent to OBS for monitoring and repeat nebs and steroids as well as trend trop. Pt states feeling better. Wheezing had resolved. Lungs CTA b/l. RRR. Abd soft, non-tender.

## 2018-09-04 ENCOUNTER — EMERGENCY (EMERGENCY)
Facility: HOSPITAL | Age: 53
LOS: 1 days | Discharge: HOME | End: 2018-09-04
Attending: EMERGENCY MEDICINE | Admitting: EMERGENCY MEDICINE
Payer: COMMERCIAL

## 2018-09-04 VITALS
SYSTOLIC BLOOD PRESSURE: 112 MMHG | DIASTOLIC BLOOD PRESSURE: 78 MMHG | TEMPERATURE: 98 F | OXYGEN SATURATION: 100 % | HEART RATE: 68 BPM | RESPIRATION RATE: 17 BRPM

## 2018-09-04 DIAGNOSIS — Z98.89 OTHER SPECIFIED POSTPROCEDURAL STATES: Chronic | ICD-10-CM

## 2018-09-04 DIAGNOSIS — Z90.89 ACQUIRED ABSENCE OF OTHER ORGANS: Chronic | ICD-10-CM

## 2018-09-04 DIAGNOSIS — Z90.49 ACQUIRED ABSENCE OF OTHER SPECIFIED PARTS OF DIGESTIVE TRACT: Chronic | ICD-10-CM

## 2018-09-04 PROCEDURE — 99284 EMERGENCY DEPT VISIT MOD MDM: CPT | Mod: 25

## 2018-09-04 RX ORDER — SODIUM CHLORIDE 9 MG/ML
1000 INJECTION INTRAMUSCULAR; INTRAVENOUS; SUBCUTANEOUS ONCE
Qty: 0 | Refills: 0 | Status: COMPLETED | OUTPATIENT
Start: 2018-09-04 | End: 2018-09-04

## 2018-09-04 RX ORDER — KETOROLAC TROMETHAMINE 30 MG/ML
30 SYRINGE (ML) INJECTION ONCE
Qty: 0 | Refills: 0 | Status: DISCONTINUED | OUTPATIENT
Start: 2018-09-04 | End: 2018-09-04

## 2018-09-04 RX ORDER — ONDANSETRON 8 MG/1
4 TABLET, FILM COATED ORAL ONCE
Qty: 0 | Refills: 0 | Status: COMPLETED | OUTPATIENT
Start: 2018-09-04 | End: 2018-09-04

## 2018-09-04 RX ADMIN — ONDANSETRON 4 MILLIGRAM(S): 8 TABLET, FILM COATED ORAL at 23:59

## 2018-09-04 RX ADMIN — Medication 30 MILLIGRAM(S): at 23:59

## 2018-09-04 RX ADMIN — SODIUM CHLORIDE 1000 MILLILITER(S): 9 INJECTION INTRAMUSCULAR; INTRAVENOUS; SUBCUTANEOUS at 23:59

## 2018-09-04 NOTE — ED PROVIDER NOTE - CARE PLAN
Principal Discharge DX:	Low back pain  Assessment and plan of treatment:	- Follow up with your doctor within 2-3 days.   - Take Tylenol (Acetaminophen) 650mg or Motrin (Ibuprofen/Advil) 600mg every 6 hours as needed for pain.   - Apply Lidoderm patch once daily.   - Return to the ED for any new or worsening symptoms.

## 2018-09-04 NOTE — ED PROVIDER NOTE - OBJECTIVE STATEMENT
52 y/o male pmh dm, asthma presents with sudden onset left lower back pain,  + nausea, + radiate to upper abdomen+ left groin area, intermittent 10/10 stabbing/sharp x few hours, no aggravating/reliving factors, denies any h/o kidney stone, any ehadaches, neck pain, cough, f/c/, chest pain, sob, hematuira, penle/testicular pain, numbness/weakness/tingling, recent travel, sick contact, social history, took 2 aspirin PTA

## 2018-09-04 NOTE — ED PROVIDER NOTE - PROGRESS NOTE DETAILS
Ann QUINN: pt reassessed, CT negative. Pain worse with movement, located on L lumbar paraspinal region. Likely MSK. Pt would like to go home. Will send home with motrin and lidoderm.

## 2018-09-04 NOTE — ED ADULT TRIAGE NOTE - CHIEF COMPLAINT QUOTE
pt. came in c/p left lower back pain radiating up to the LUQ since 1 pm today, worsened upon mov't. pt. also reports nausea, no vomiting and pain upon urination. denies any SOB nor fever. pt. reports taking 2 X ASA 81mg at 6pm PTA. PMHx: asthma, DM pt. came in c/p left lower back pain radiating up to the LUQ since 1 pm today, worsened upon mov't. pt. also reports nausea, no vomiting and has (+) pain upon urination. denies any SOB nor fever. pt. reports taking 2 X ASA 81mg at 6pm PTA. PMHx: asthma, DM

## 2018-09-04 NOTE — ED PROVIDER NOTE - ATTENDING CONTRIBUTION TO CARE
ED Attending (Shanta TREVIZO): I have personally performed a face to face bedside history and physical examination of this patient. I have discussed the history, examination, assessment and plan of management with the Physician Assistant. My findings include: 54 y/o male pmh dm, asthma presents with sudden onset left lower back pain,  + nausea, + radiate to upper abdomen+ left groin area, intermittent 10/10 stabbing/sharp x few hours, no aggravating/reliving factors, denies any h/o kidney stone, any ehadaches, neck pain, cough, f/c/, chest pain, sob, hematuira, penle/testicular pain, numbness/weakness/tingling, recent travel, sick contact, social history, took 2 aspirin PTA ED Attending (Shanta TREVIZO): I have personally performed a face to face bedside history and physical examination of this patient. I have discussed the history, examination, assessment and plan of management with the Physician Assistant. My findings include: 52 y/o male pmh dm, asthma presents with sudden onset left lower back pain,  + nausea, + radiate to upper abdomen+ left groin area, intermittent 10/10 stabbing/sharp x few hours, no aggravating/reliving factors, denies any h/o kidney stone, any ehadaches, neck pain, cough, f/c/, chest pain, sob, hematuira, penle/testicular pain, numbness/weakness/tingling, recent travel, sick contact, social history, took 2 aspirin PTA On exam: In Pain, uncomfortable, VSS, Abdo soft non tender, + L CVAT, Normal testicular exam bilat, no hernias, Pulses normal and equal bilaterally lungs and heart normal, neuro non focal. IMP: R flank pain and CVAT, r/o renal colic vs other etiology. Plan: labs UA analgesia, IVF, CT abdo/pelvis

## 2018-09-04 NOTE — ED PROVIDER NOTE - PLAN OF CARE
- Follow up with your doctor within 2-3 days.   - Take Tylenol (Acetaminophen) 650mg or Motrin (Ibuprofen/Advil) 600mg every 6 hours as needed for pain.   - Apply Lidoderm patch once daily.   - Return to the ED for any new or worsening symptoms.

## 2018-09-05 VITALS
HEART RATE: 61 BPM | DIASTOLIC BLOOD PRESSURE: 75 MMHG | OXYGEN SATURATION: 100 % | TEMPERATURE: 98 F | SYSTOLIC BLOOD PRESSURE: 120 MMHG | RESPIRATION RATE: 17 BRPM

## 2018-09-05 LAB
ALBUMIN SERPL ELPH-MCNC: 3.4 G/DL — SIGNIFICANT CHANGE UP (ref 3.3–5)
ALP SERPL-CCNC: 74 U/L — SIGNIFICANT CHANGE UP (ref 40–120)
ALT FLD-CCNC: 16 U/L — SIGNIFICANT CHANGE UP (ref 4–41)
APPEARANCE UR: CLEAR — SIGNIFICANT CHANGE UP
AST SERPL-CCNC: 23 U/L — SIGNIFICANT CHANGE UP (ref 4–40)
BASOPHILS # BLD AUTO: 0.04 K/UL — SIGNIFICANT CHANGE UP (ref 0–0.2)
BASOPHILS NFR BLD AUTO: 0.7 % — SIGNIFICANT CHANGE UP (ref 0–2)
BILIRUB SERPL-MCNC: 0.3 MG/DL — SIGNIFICANT CHANGE UP (ref 0.2–1.2)
BILIRUB UR-MCNC: NEGATIVE — SIGNIFICANT CHANGE UP
BLOOD UR QL VISUAL: NEGATIVE — SIGNIFICANT CHANGE UP
BUN SERPL-MCNC: 11 MG/DL — SIGNIFICANT CHANGE UP (ref 7–23)
CALCIUM SERPL-MCNC: 8.7 MG/DL — SIGNIFICANT CHANGE UP (ref 8.4–10.5)
CHLORIDE SERPL-SCNC: 102 MMOL/L — SIGNIFICANT CHANGE UP (ref 98–107)
CO2 SERPL-SCNC: 25 MMOL/L — SIGNIFICANT CHANGE UP (ref 22–31)
COLOR SPEC: YELLOW — SIGNIFICANT CHANGE UP
CREAT SERPL-MCNC: 0.63 MG/DL — SIGNIFICANT CHANGE UP (ref 0.5–1.3)
EOSINOPHIL # BLD AUTO: 0.08 K/UL — SIGNIFICANT CHANGE UP (ref 0–0.5)
EOSINOPHIL NFR BLD AUTO: 1.4 % — SIGNIFICANT CHANGE UP (ref 0–6)
GLUCOSE SERPL-MCNC: 94 MG/DL — SIGNIFICANT CHANGE UP (ref 70–99)
GLUCOSE UR-MCNC: SIGNIFICANT CHANGE UP
HCT VFR BLD CALC: 43.1 % — SIGNIFICANT CHANGE UP (ref 39–50)
HGB BLD-MCNC: 14.9 G/DL — SIGNIFICANT CHANGE UP (ref 13–17)
IMM GRANULOCYTES # BLD AUTO: 0.01 # — SIGNIFICANT CHANGE UP
IMM GRANULOCYTES NFR BLD AUTO: 0.2 % — SIGNIFICANT CHANGE UP (ref 0–1.5)
KETONES UR-MCNC: NEGATIVE — SIGNIFICANT CHANGE UP
LEUKOCYTE ESTERASE UR-ACNC: NEGATIVE — SIGNIFICANT CHANGE UP
LYMPHOCYTES # BLD AUTO: 2.87 K/UL — SIGNIFICANT CHANGE UP (ref 1–3.3)
LYMPHOCYTES # BLD AUTO: 50.9 % — HIGH (ref 13–44)
MCHC RBC-ENTMCNC: 28.7 PG — SIGNIFICANT CHANGE UP (ref 27–34)
MCHC RBC-ENTMCNC: 34.6 % — SIGNIFICANT CHANGE UP (ref 32–36)
MCV RBC AUTO: 82.9 FL — SIGNIFICANT CHANGE UP (ref 80–100)
MONOCYTES # BLD AUTO: 0.51 K/UL — SIGNIFICANT CHANGE UP (ref 0–0.9)
MONOCYTES NFR BLD AUTO: 9 % — SIGNIFICANT CHANGE UP (ref 2–14)
NEUTROPHILS # BLD AUTO: 2.13 K/UL — SIGNIFICANT CHANGE UP (ref 1.8–7.4)
NEUTROPHILS NFR BLD AUTO: 37.8 % — LOW (ref 43–77)
NITRITE UR-MCNC: NEGATIVE — SIGNIFICANT CHANGE UP
NRBC # FLD: 0 — SIGNIFICANT CHANGE UP
PH UR: 6.5 — SIGNIFICANT CHANGE UP (ref 5–8)
PLATELET # BLD AUTO: 211 K/UL — SIGNIFICANT CHANGE UP (ref 150–400)
PMV BLD: 10 FL — SIGNIFICANT CHANGE UP (ref 7–13)
POTASSIUM SERPL-MCNC: 4.1 MMOL/L — SIGNIFICANT CHANGE UP (ref 3.5–5.3)
POTASSIUM SERPL-SCNC: 4.1 MMOL/L — SIGNIFICANT CHANGE UP (ref 3.5–5.3)
PROT SERPL-MCNC: 6.6 G/DL — SIGNIFICANT CHANGE UP (ref 6–8.3)
PROT UR-MCNC: SIGNIFICANT CHANGE UP
RBC # BLD: 5.2 M/UL — SIGNIFICANT CHANGE UP (ref 4.2–5.8)
RBC # FLD: 12.7 % — SIGNIFICANT CHANGE UP (ref 10.3–14.5)
RBC CASTS # UR COMP ASSIST: SIGNIFICANT CHANGE UP (ref 0–?)
SODIUM SERPL-SCNC: 140 MMOL/L — SIGNIFICANT CHANGE UP (ref 135–145)
SP GR SPEC: 1.02 — SIGNIFICANT CHANGE UP (ref 1–1.04)
UROBILINOGEN FLD QL: NORMAL — SIGNIFICANT CHANGE UP
WBC # BLD: 5.64 K/UL — SIGNIFICANT CHANGE UP (ref 3.8–10.5)
WBC # FLD AUTO: 5.64 K/UL — SIGNIFICANT CHANGE UP (ref 3.8–10.5)
WBC UR QL: SIGNIFICANT CHANGE UP (ref 0–?)

## 2018-09-05 PROCEDURE — 74176 CT ABD & PELVIS W/O CONTRAST: CPT | Mod: 26

## 2018-09-05 RX ORDER — LIDOCAINE 4 G/100G
1 CREAM TOPICAL
Qty: 5 | Refills: 0 | OUTPATIENT
Start: 2018-09-05 | End: 2018-09-09

## 2018-09-05 RX ADMIN — Medication 30 MILLIGRAM(S): at 00:30

## 2018-09-05 RX ADMIN — SODIUM CHLORIDE 1000 MILLILITER(S): 9 INJECTION INTRAMUSCULAR; INTRAVENOUS; SUBCUTANEOUS at 01:00

## 2018-09-05 NOTE — ED ADULT NURSE REASSESSMENT NOTE - NS ED NURSE REASSESS COMMENT FT1
rec'd pt. asleep but arousable, with NS infusing via g20 saline lock on rt hand with no ss of infiltration. pt. reports pain is minimally lessened on his back worsened when he gets up. denies any n/v at this time. awaits CT. will continue to monitor

## 2018-09-05 NOTE — ED ADULT NURSE NOTE - CHIEF COMPLAINT QUOTE
pt. came in c/p left lower back pain radiating up to the LUQ since 1 pm today, worsened upon mov't. pt. also reports nausea, no vomiting and has (+) pain upon urination. denies any SOB nor fever. pt. reports taking 2 X ASA 81mg at 6pm PTA. PMHx: asthma, DM

## 2018-09-05 NOTE — ED ADULT NURSE NOTE - NSIMPLEMENTINTERV_GEN_ALL_ED
Implemented All Universal Safety Interventions:  [place]raper to call system. Call bell, personal items and telephone within reach. Instruct patient to call for assistance. Room bathroom lighting operational. Non-slip footwear when patient is off stretcher. Physically safe environment: no spills, clutter or unnecessary equipment. Stretcher in lowest position, wheels locked, appropriate side rails in place.

## 2018-09-06 LAB
BACTERIA UR CULT: SIGNIFICANT CHANGE UP
SPECIMEN SOURCE: SIGNIFICANT CHANGE UP

## 2019-02-05 ENCOUNTER — EMERGENCY (EMERGENCY)
Facility: HOSPITAL | Age: 54
LOS: 1 days | Discharge: HOME | End: 2019-02-05
Attending: EMERGENCY MEDICINE | Admitting: EMERGENCY MEDICINE
Payer: COMMERCIAL

## 2019-02-05 VITALS
RESPIRATION RATE: 18 BRPM | TEMPERATURE: 98 F | HEART RATE: 85 BPM | SYSTOLIC BLOOD PRESSURE: 105 MMHG | OXYGEN SATURATION: 99 % | DIASTOLIC BLOOD PRESSURE: 61 MMHG

## 2019-02-05 VITALS
SYSTOLIC BLOOD PRESSURE: 106 MMHG | HEART RATE: 80 BPM | DIASTOLIC BLOOD PRESSURE: 61 MMHG | OXYGEN SATURATION: 100 % | RESPIRATION RATE: 16 BRPM

## 2019-02-05 DIAGNOSIS — Z90.89 ACQUIRED ABSENCE OF OTHER ORGANS: Chronic | ICD-10-CM

## 2019-02-05 DIAGNOSIS — Z90.49 ACQUIRED ABSENCE OF OTHER SPECIFIED PARTS OF DIGESTIVE TRACT: Chronic | ICD-10-CM

## 2019-02-05 DIAGNOSIS — Z98.89 OTHER SPECIFIED POSTPROCEDURAL STATES: Chronic | ICD-10-CM

## 2019-02-05 LAB
ALBUMIN SERPL ELPH-MCNC: 3.5 G/DL — SIGNIFICANT CHANGE UP (ref 3.3–5)
ALP SERPL-CCNC: 65 U/L — SIGNIFICANT CHANGE UP (ref 40–120)
ALT FLD-CCNC: 11 U/L — SIGNIFICANT CHANGE UP (ref 4–41)
ANION GAP SERPL CALC-SCNC: 9 MMO/L — SIGNIFICANT CHANGE UP (ref 7–14)
APTT BLD: 32.9 SEC — SIGNIFICANT CHANGE UP (ref 27.5–36.3)
AST SERPL-CCNC: 14 U/L — SIGNIFICANT CHANGE UP (ref 4–40)
BASE EXCESS BLDV CALC-SCNC: 4.5 MMOL/L — SIGNIFICANT CHANGE UP
BASOPHILS # BLD AUTO: 0.04 K/UL — SIGNIFICANT CHANGE UP (ref 0–0.2)
BASOPHILS NFR BLD AUTO: 0.6 % — SIGNIFICANT CHANGE UP (ref 0–2)
BILIRUB SERPL-MCNC: 0.3 MG/DL — SIGNIFICANT CHANGE UP (ref 0.2–1.2)
BLOOD GAS VENOUS - CREATININE: 0.65 MG/DL — SIGNIFICANT CHANGE UP (ref 0.5–1.3)
BUN SERPL-MCNC: 8 MG/DL — SIGNIFICANT CHANGE UP (ref 7–23)
CALCIUM SERPL-MCNC: 8.9 MG/DL — SIGNIFICANT CHANGE UP (ref 8.4–10.5)
CHLORIDE BLDV-SCNC: 106 MMOL/L — SIGNIFICANT CHANGE UP (ref 96–108)
CHLORIDE SERPL-SCNC: 101 MMOL/L — SIGNIFICANT CHANGE UP (ref 98–107)
CO2 SERPL-SCNC: 29 MMOL/L — SIGNIFICANT CHANGE UP (ref 22–31)
CREAT SERPL-MCNC: 0.73 MG/DL — SIGNIFICANT CHANGE UP (ref 0.5–1.3)
EOSINOPHIL # BLD AUTO: 0.09 K/UL — SIGNIFICANT CHANGE UP (ref 0–0.5)
EOSINOPHIL NFR BLD AUTO: 1.3 % — SIGNIFICANT CHANGE UP (ref 0–6)
GAS PNL BLDV: 137 MMOL/L — SIGNIFICANT CHANGE UP (ref 136–146)
GLUCOSE BLDV-MCNC: 77 — SIGNIFICANT CHANGE UP (ref 70–99)
GLUCOSE SERPL-MCNC: 82 MG/DL — SIGNIFICANT CHANGE UP (ref 70–99)
HCO3 BLDV-SCNC: 27 MMOL/L — SIGNIFICANT CHANGE UP (ref 20–27)
HCT VFR BLD CALC: 43.4 % — SIGNIFICANT CHANGE UP (ref 39–50)
HCT VFR BLDV CALC: 47.4 % — SIGNIFICANT CHANGE UP (ref 39–51)
HGB BLD-MCNC: 15 G/DL — SIGNIFICANT CHANGE UP (ref 13–17)
HGB BLDV-MCNC: 15.5 G/DL — SIGNIFICANT CHANGE UP (ref 13–17)
IMM GRANULOCYTES NFR BLD AUTO: 0.3 % — SIGNIFICANT CHANGE UP (ref 0–1.5)
INR BLD: 0.99 — SIGNIFICANT CHANGE UP (ref 0.88–1.17)
LACTATE BLDV-MCNC: 0.8 MMOL/L — SIGNIFICANT CHANGE UP (ref 0.5–2)
LIDOCAIN IGE QN: 64.2 U/L — HIGH (ref 7–60)
LYMPHOCYTES # BLD AUTO: 2.85 K/UL — SIGNIFICANT CHANGE UP (ref 1–3.3)
LYMPHOCYTES # BLD AUTO: 42.3 % — SIGNIFICANT CHANGE UP (ref 13–44)
MAGNESIUM SERPL-MCNC: 2 MG/DL — SIGNIFICANT CHANGE UP (ref 1.6–2.6)
MCHC RBC-ENTMCNC: 28.6 PG — SIGNIFICANT CHANGE UP (ref 27–34)
MCHC RBC-ENTMCNC: 34.6 % — SIGNIFICANT CHANGE UP (ref 32–36)
MCV RBC AUTO: 82.8 FL — SIGNIFICANT CHANGE UP (ref 80–100)
MONOCYTES # BLD AUTO: 0.47 K/UL — SIGNIFICANT CHANGE UP (ref 0–0.9)
MONOCYTES NFR BLD AUTO: 7 % — SIGNIFICANT CHANGE UP (ref 2–14)
NEUTROPHILS # BLD AUTO: 3.27 K/UL — SIGNIFICANT CHANGE UP (ref 1.8–7.4)
NEUTROPHILS NFR BLD AUTO: 48.5 % — SIGNIFICANT CHANGE UP (ref 43–77)
NRBC # FLD: 0 K/UL — LOW (ref 25–125)
PCO2 BLDV: 49 MMHG — SIGNIFICANT CHANGE UP (ref 41–51)
PH BLDV: 7.4 PH — SIGNIFICANT CHANGE UP (ref 7.32–7.43)
PLATELET # BLD AUTO: 207 K/UL — SIGNIFICANT CHANGE UP (ref 150–400)
PMV BLD: 9.2 FL — SIGNIFICANT CHANGE UP (ref 7–13)
PO2 BLDV: 54 MMHG — HIGH (ref 35–40)
POTASSIUM BLDV-SCNC: 3.5 MMOL/L — SIGNIFICANT CHANGE UP (ref 3.4–4.5)
POTASSIUM SERPL-MCNC: 3.9 MMOL/L — SIGNIFICANT CHANGE UP (ref 3.5–5.3)
POTASSIUM SERPL-SCNC: 3.9 MMOL/L — SIGNIFICANT CHANGE UP (ref 3.5–5.3)
PROCALCITONIN SERPL-MCNC: 0.06 NG/ML — SIGNIFICANT CHANGE UP (ref 0.02–0.1)
PROT SERPL-MCNC: 6.4 G/DL — SIGNIFICANT CHANGE UP (ref 6–8.3)
PROTHROM AB SERPL-ACNC: 11.3 SEC — SIGNIFICANT CHANGE UP (ref 9.8–13.1)
RBC # BLD: 5.24 M/UL — SIGNIFICANT CHANGE UP (ref 4.2–5.8)
RBC # FLD: 12.6 % — SIGNIFICANT CHANGE UP (ref 10.3–14.5)
SAO2 % BLDV: 87 % — HIGH (ref 60–85)
SODIUM SERPL-SCNC: 139 MMOL/L — SIGNIFICANT CHANGE UP (ref 135–145)
TROPONIN T, HIGH SENSITIVITY: 7 NG/L — SIGNIFICANT CHANGE UP (ref ?–14)
TROPONIN T, HIGH SENSITIVITY: < 6 NG/L — SIGNIFICANT CHANGE UP (ref ?–14)
WBC # BLD: 6.74 K/UL — SIGNIFICANT CHANGE UP (ref 3.8–10.5)
WBC # FLD AUTO: 6.74 K/UL — SIGNIFICANT CHANGE UP (ref 3.8–10.5)

## 2019-02-05 PROCEDURE — 93010 ELECTROCARDIOGRAM REPORT: CPT

## 2019-02-05 PROCEDURE — 71046 X-RAY EXAM CHEST 2 VIEWS: CPT | Mod: 26

## 2019-02-05 PROCEDURE — 99285 EMERGENCY DEPT VISIT HI MDM: CPT | Mod: 25

## 2019-02-05 PROCEDURE — 74177 CT ABD & PELVIS W/CONTRAST: CPT | Mod: 26

## 2019-02-05 RX ORDER — MORPHINE SULFATE 50 MG/1
2 CAPSULE, EXTENDED RELEASE ORAL ONCE
Qty: 0 | Refills: 0 | Status: DISCONTINUED | OUTPATIENT
Start: 2019-02-05 | End: 2019-02-05

## 2019-02-05 RX ORDER — ONDANSETRON 8 MG/1
4 TABLET, FILM COATED ORAL ONCE
Qty: 0 | Refills: 0 | Status: COMPLETED | OUTPATIENT
Start: 2019-02-05 | End: 2019-02-05

## 2019-02-05 RX ORDER — FAMOTIDINE 10 MG/ML
20 INJECTION INTRAVENOUS ONCE
Qty: 0 | Refills: 0 | Status: COMPLETED | OUTPATIENT
Start: 2019-02-05 | End: 2019-02-05

## 2019-02-05 RX ORDER — SODIUM CHLORIDE 9 MG/ML
1000 INJECTION, SOLUTION INTRAVENOUS ONCE
Qty: 0 | Refills: 0 | Status: COMPLETED | OUTPATIENT
Start: 2019-02-05 | End: 2019-02-05

## 2019-02-05 RX ORDER — MORPHINE SULFATE 50 MG/1
4 CAPSULE, EXTENDED RELEASE ORAL ONCE
Qty: 0 | Refills: 0 | Status: DISCONTINUED | OUTPATIENT
Start: 2019-02-05 | End: 2019-02-05

## 2019-02-05 RX ORDER — SODIUM CHLORIDE 9 MG/ML
2000 INJECTION, SOLUTION INTRAVENOUS ONCE
Qty: 0 | Refills: 0 | Status: COMPLETED | OUTPATIENT
Start: 2019-02-05 | End: 2019-02-05

## 2019-02-05 RX ORDER — ACETAMINOPHEN 500 MG
650 TABLET ORAL ONCE
Qty: 0 | Refills: 0 | Status: COMPLETED | OUTPATIENT
Start: 2019-02-05 | End: 2019-02-05

## 2019-02-05 RX ADMIN — Medication 650 MILLIGRAM(S): at 21:26

## 2019-02-05 RX ADMIN — SODIUM CHLORIDE 1000 MILLILITER(S): 9 INJECTION, SOLUTION INTRAVENOUS at 21:26

## 2019-02-05 RX ADMIN — MORPHINE SULFATE 2 MILLIGRAM(S): 50 CAPSULE, EXTENDED RELEASE ORAL at 23:16

## 2019-02-05 RX ADMIN — FAMOTIDINE 20 MILLIGRAM(S): 10 INJECTION INTRAVENOUS at 19:30

## 2019-02-05 RX ADMIN — SODIUM CHLORIDE 2000 MILLILITER(S): 9 INJECTION, SOLUTION INTRAVENOUS at 19:30

## 2019-02-05 RX ADMIN — ONDANSETRON 4 MILLIGRAM(S): 8 TABLET, FILM COATED ORAL at 19:30

## 2019-02-05 RX ADMIN — Medication 30 MILLILITER(S): at 19:30

## 2019-02-05 RX ADMIN — MORPHINE SULFATE 4 MILLIGRAM(S): 50 CAPSULE, EXTENDED RELEASE ORAL at 19:30

## 2019-02-05 NOTE — ED PROVIDER NOTE - ATTENDING CONTRIBUTION TO CARE
look at MDM I performed a face to face evaluation of this patient and obtained a history and performed a full exam.  I agree with the history, physical exam and plan of the PA.

## 2019-02-05 NOTE — ED PROVIDER NOTE - OBJECTIVE STATEMENT
53 y/o male PMHx DM on insulin, asthma previous intubations x2, obesity s/p gastric lap band [place] 2016, appendectomy, hernia repair presented to the ED c/o abdominal pain x2 weeks and chest pain x4 days. Patient stated he has constant 8/10 sharp lower abdominal pain. Abdominal pain radiates to the back and has no improvement of symptoms with Motrin. Has had three daily episodes of watery diarrhea for the past week and was directed to the ED today by PMD as patient had 3 episodes of NBNB emesis today. Patient was recommended CT AP by PMD 1 week ago but hasn't gotten it done because insurance required prior auth. Patient also c/o exertional substernal/left sided pressure like chest pain over last 2-3 days. Last stress test was 2 years ago which patient does not remember results. Patient denied urinary complaints, fever chills, headache, SOB, PAGE, peripheral edema, palpitations 53 y/o male PMHx DM on insulin, asthma previous intubations x2, obesity s/p gastric lap band [place] 2016, appendectomy, hernia repair presented to the ED c/o abdominal pain x2 weeks and chest pain x4 days. Patient stated he has constant 8/10 sharp lower abdominal pain. Abdominal pain radiates to the back and has no improvement of symptoms with Motrin. Has had three daily episodes of watery diarrhea for the past week and was directed to the ED today by PMD as patient had 3 episodes of NBNB emesis today. Patient was recommended CT AP by PMD 1 week ago but hasn't gotten it done because insurance required prior auth. Patient also c/o exertional substernal/left sided pressure like chest pain over last 2-3 days. Last stress test was 2 years ago which patient does not remember results. Patient denied urinary complaints, fever chills, headache, SOB, PAGE, peripheral edema, palpitations.  Attending - Agree with above.  I evaluated patient myself.  53 y/o M c/o b/l lower abd pain x 53 y/o male PMHx DM on insulin, asthma previous intubations x2, obesity s/p gastric lap band [place] 2016, appendectomy, hernia repair presented to the ED c/o abdominal pain x2 weeks and chest pain x4 days. Patient stated he has constant 8/10 sharp lower abdominal pain. Abdominal pain radiates to the back and has no improvement of symptoms with Motrin. Has had three daily episodes of watery diarrhea for the past week and was directed to the ED today by PMD as patient had 3 episodes of NBNB emesis today. Patient was recommended CT AP by PMD 1 week ago but hasn't gotten it done because insurance required prior auth. Patient also c/o exertional substernal/left sided pressure like chest pain over last 2-3 days. Last stress test was 2 years ago which patient does not remember results. Patient denied urinary complaints, fever chills, headache, SOB, PAGE, peripheral edema, palpitations.  Attending - Agree with above.  I evaluated patient myself.  53 y/o M c/o b/l lower abd pain x 3 weeks.  Constantly present. Pain worse with eating.  Diarrhea x 1 week.  Not tolerating PO today.  Reports vomiting x 3 episodes.  No hematemesis.  Noted hx gastric band surgery.  No prior complication from surgery.  No fever.  Also notes he has been having SSCP since Saturday.  States chest pain many years ago, but then none since Saturday.  To Cardiologist Dr. Sherman Apodaca on Saturday.  Patient reports was told ECG was normal and doctor wanted to arrange stress test in future.  Chest pain is constantly present, worse with exertion.  Denies any shortness of breathe.  No palpitations or lightheadedness.

## 2019-02-05 NOTE — ED PROVIDER NOTE - MEDICAL DECISION MAKING DETAILS
53 y/o male PMHx DM on insulin, asthma previous intubations x2, obesity s/p gastric lap band [place] 2016, appendectomy, hernia repair presented to the ED c/o abdominal pain x2 weeks and chest pain x4 days. Patient is non toxic appearing, in slight distress from pain, hypoactive BS, non distended, diffuse TTP lower abdomen. Lungs CTA without accessory muscle use, Cardiac normal rhythm and rate. Will gets labs, CXR, EKG, CT AP r/o SBO vs divertic r/o ACS with EKG and trop

## 2019-02-05 NOTE — ED PROVIDER NOTE - PROGRESS NOTE DETAILS
SUSANNE Tatum: patient stated he had slight improvement with morphine 4mg however pain is coming back. Will give morphine 2mg and reassess. Labs unremarkable. Patient pending CT SUSANNE Tatum: patient stated he continues to have abdominal pain despite morphine. CT showed bladder wall thickening. Patient has bilateral CVA tenderness. Instructed patient to leave urine to send for culture as patient may have pyelo SUSANNE Tatum: patient currently asymptomatic requesting discharge. Patient has close cardiology follow up. Chest pain reproducible low suspicion for ACS. Negative CT and urine. Patient cleared for discharged per ED attending Dr. Chang SUSANNE Alfaro: I have personally seen and examined this patient, pt is in NAD, requesting to go home, urinalysis/labs/imaging are non-diagnostic, pt will receive call from the Admin PA ( left) to follow up urine culture results. Pt stable to be discharged from the ED.

## 2019-02-05 NOTE — ED PROVIDER NOTE - PLAN OF CARE
Follow up with your Primary Care Physician within the next 2-3 days  Bring a copy of your test results with you to your appointment  Continue your current medication regimen  Return to the Emergency Room if you experience new or worsening symptoms   Patient to follow up with cardiologist and gastroenterologist

## 2019-02-05 NOTE — ED PROVIDER NOTE - PHYSICAL EXAMINATION
ATTENDING PHYSICAL EXAM DR. Chang ***GEN - Appears uncomfortable from abd pain with movement.  A+O x3 ***HEAD - NC/AT ***EYES/NOSE - PERRL, EOMI, mucous membranes moist, no discharge ***THROAT: Oral cavity and pharynx normal. No inflammation, swelling, exudate, or lesions.  ***NECK: Neck supple, non-tender without lymphadenopathy, no masses, no JVD.   ***PULMONARY - CTA b/l, symmetric breath sounds. ***CARDIAC -s1s2, RRR, no M,R,G  ***ABDOMEN - +surg scars noted, +BS, ND, + LLQ/RLQ TTP, soft, no guarding, no rebound, no masses   ***BACK - no CVA tenderness, Normal  spine ***EXTREMITIES - symmetric pulses, 2+ dp, capillary refill < 2 seconds, no clubbing, no cyanosis, no edema ***SKIN - no rash or bruising   ***NEUROLOGIC - alert, CN 2-12 intact

## 2019-02-05 NOTE — ED PROVIDER NOTE - CARE PLAN
Principal Discharge DX:	Abdominal pain Principal Discharge DX:	Abdominal pain  Assessment and plan of treatment:	Follow up with your Primary Care Physician within the next 2-3 days  Bring a copy of your test results with you to your appointment  Continue your current medication regimen  Return to the Emergency Room if you experience new or worsening symptoms   Patient to follow up with cardiologist and gastroenterologist

## 2019-02-06 LAB
APPEARANCE UR: CLEAR — SIGNIFICANT CHANGE UP
BACTERIA # UR AUTO: NEGATIVE — SIGNIFICANT CHANGE UP
BILIRUB UR-MCNC: NEGATIVE — SIGNIFICANT CHANGE UP
BLOOD UR QL VISUAL: NEGATIVE — SIGNIFICANT CHANGE UP
COLOR SPEC: SIGNIFICANT CHANGE UP
GLUCOSE UR-MCNC: NEGATIVE — SIGNIFICANT CHANGE UP
HYALINE CASTS # UR AUTO: NEGATIVE — SIGNIFICANT CHANGE UP
KETONES UR-MCNC: SIGNIFICANT CHANGE UP
LEUKOCYTE ESTERASE UR-ACNC: NEGATIVE — SIGNIFICANT CHANGE UP
NITRITE UR-MCNC: NEGATIVE — SIGNIFICANT CHANGE UP
PH UR: 8.5 — HIGH (ref 5–8)
PROT UR-MCNC: 30 — SIGNIFICANT CHANGE UP
RBC CASTS # UR COMP ASSIST: SIGNIFICANT CHANGE UP (ref 0–?)
SP GR SPEC: > 1.05 — HIGH (ref 1–1.04)
SQUAMOUS # UR AUTO: SIGNIFICANT CHANGE UP
UROBILINOGEN FLD QL: NORMAL — SIGNIFICANT CHANGE UP
WBC UR QL: SIGNIFICANT CHANGE UP (ref 0–?)

## 2019-02-07 LAB
BACTERIA UR CULT: SIGNIFICANT CHANGE UP
SPECIMEN SOURCE: SIGNIFICANT CHANGE UP

## 2019-03-20 NOTE — ED PROVIDER NOTE - ENMT NEGATIVE STATEMENT, MLM
English
Ears: no ear pain and no hearing problems.Nose: no nasal congestion and no nasal drainage.Mouth/Throat: no dysphagia, no hoarseness and no throat pain.Neck: no lumps, no pain, no stiffness and no swollen glands.

## 2019-04-10 ENCOUNTER — OUTPATIENT (OUTPATIENT)
Dept: OUTPATIENT SERVICES | Facility: HOSPITAL | Age: 54
LOS: 1 days | End: 2019-04-10

## 2019-04-10 ENCOUNTER — TRANSCRIPTION ENCOUNTER (OUTPATIENT)
Age: 54
End: 2019-04-10

## 2019-04-10 VITALS
DIASTOLIC BLOOD PRESSURE: 72 MMHG | WEIGHT: 154.1 LBS | HEART RATE: 68 BPM | HEIGHT: 63 IN | RESPIRATION RATE: 16 BRPM | TEMPERATURE: 98 F | OXYGEN SATURATION: 98 % | SYSTOLIC BLOOD PRESSURE: 114 MMHG

## 2019-04-10 DIAGNOSIS — L05.02 PILONIDAL SINUS WITH ABSCESS: ICD-10-CM

## 2019-04-10 DIAGNOSIS — Z90.49 ACQUIRED ABSENCE OF OTHER SPECIFIED PARTS OF DIGESTIVE TRACT: Chronic | ICD-10-CM

## 2019-04-10 DIAGNOSIS — L05.91 PILONIDAL CYST WITHOUT ABSCESS: ICD-10-CM

## 2019-04-10 DIAGNOSIS — Z98.89 OTHER SPECIFIED POSTPROCEDURAL STATES: Chronic | ICD-10-CM

## 2019-04-10 DIAGNOSIS — Z90.89 ACQUIRED ABSENCE OF OTHER ORGANS: Chronic | ICD-10-CM

## 2019-04-10 DIAGNOSIS — Z98.890 OTHER SPECIFIED POSTPROCEDURAL STATES: Chronic | ICD-10-CM

## 2019-04-10 DIAGNOSIS — Z98.84 BARIATRIC SURGERY STATUS: Chronic | ICD-10-CM

## 2019-04-10 PROBLEM — G47.33 OBSTRUCTIVE SLEEP APNEA (ADULT) (PEDIATRIC): Chronic | Status: ACTIVE | Noted: 2019-04-10

## 2019-04-10 RX ORDER — SODIUM CHLORIDE 9 MG/ML
1000 INJECTION, SOLUTION INTRAVENOUS
Qty: 0 | Refills: 0 | Status: DISCONTINUED | OUTPATIENT
Start: 2019-04-11 | End: 2019-04-12

## 2019-04-10 NOTE — ASU PATIENT PROFILE, ADULT - HEALTHCARE INFORMATION NEEDED, PROFILE
[facility] Emergency Department  [address]  [facility] [address]  Phone:  [phone]  Fax:  [phone]                                    Yola Arango   MRN: [id number]    Department:  [facility] Emergency Department   Date of Visit:  4/2/2019           After Visit Summary Signature Page    I have received my discharge instructions, and my questions have been answered. I have discussed any challenges I see with this plan with the nurse or doctor.    ..........................................................................................................................................  Patient/Patient Representative Signature      ..........................................................................................................................................  Patient Representative Print Name and Relationship to Patient    ..................................................               ................................................  Date                                   Time    ..........................................................................................................................................  Reviewed by Signature/Title    ...................................................              ..............................................  Date                                               Time          22EPIC Rev 08/18        nothing

## 2019-04-10 NOTE — ASU PATIENT PROFILE, ADULT - PSH
S/P appendectomy    S/P hernia repair  bilateral inguinal hernia repair  S/P laparoscopic sleeve gastrectomy  2015  S/P sinus surgery  with septoplasty  S/P tonsillectomy

## 2019-04-10 NOTE — ASU PATIENT PROFILE, ADULT - PMH
Asthma  intubated x 2 in 1992, hospitalized in 2011, currently stable, last used rescue inhaler 7 months ago  DM (diabetes mellitus)  resolved with weight loss surgery  H/O: Obesity  s/p gastric sleeve in 2015, lost ~75 lbs  Hypertension  on diet control  Lower back pain    SHARMIN (obstructive sleep apnea)  resolved after weight loss surgery  Pilonidal cyst with abscess

## 2019-04-10 NOTE — H&P PST ADULT - MUSCULOSKELETAL
details… detailed exam no joint erythema/no joint swelling/no calf tenderness/no joint warmth/ROM intact/normal strength

## 2019-04-10 NOTE — H&P PST ADULT - NSICDXPASTSURGICALHX_GEN_ALL_CORE_FT
PAST SURGICAL HISTORY:  S/P appendectomy     S/P hernia repair bilateral inguinal hernia repair    S/P laparoscopic sleeve gastrectomy 2015    S/P sinus surgery with septoplasty    S/P tonsillectomy

## 2019-04-10 NOTE — H&P PST ADULT - NSICDXPASTMEDICALHX_GEN_ALL_CORE_FT
PAST MEDICAL HISTORY:  Asthma intubated x 2 in 1992, hospitalized in 2011, currently stable, last used rescue inhaler 7 months ago    DM (diabetes mellitus) resolved with weight loss surgery    H/O: Obesity s/p gastric sleeve in 2015, lost ~75 lbs    Hypertension on diet control    Lower back pain     SHARMIN (obstructive sleep apnea) resolved after weight loss surgery    Pilonidal cyst with abscess

## 2019-04-10 NOTE — H&P PST ADULT - NEGATIVE GASTROINTESTINAL SYMPTOMS
no melena/no nausea/no change in bowel habits/no vomiting/no abdominal pain/no diarrhea/no constipation

## 2019-04-10 NOTE — H&P PST ADULT - NSICDXPROBLEM_GEN_ALL_CORE_FT
PROBLEM DIAGNOSES  Problem: Pilonidal cyst  Assessment and Plan:   Pt is scheduled for excision of pilonidal cyst for 4/11/19. Pre-op instructions provided. Pepcid provided for GI prophylaxis. Pt stated understanding. SHARMIN precautions, OR booking notified. Medical evaluation in chart with labs and EKG. Case discussed with anesthesia attending.

## 2019-04-10 NOTE — H&P PST ADULT - HISTORY OF PRESENT ILLNESS
54 year old male presents to presurgical testing with diagnosis of pilonidal sinus with abscess scheduled for excision of pilonidal cyst for 4/11/19. Pt reports he was diagnosed with pilonidal cyst last year, with recurrence this year, evaluated in the ED in 2/2019, treated with antibiotics. Pt reports some tenderness to area with sitting, denies discharge at this time.

## 2019-04-10 NOTE — H&P PST ADULT - NEGATIVE ENMT SYMPTOMS
no ear pain/no tinnitus/no sinus symptoms/no nose bleeds/no dysphagia/no hearing difficulty/no vertigo/no throat pain

## 2019-04-10 NOTE — H&P PST ADULT - RS GEN PE MLT RESP DETAILS PC
breath sounds equal/good air movement/airway patent/no wheezes/respirations non-labored/no rales/clear to auscultation bilaterally/no rhonchi

## 2019-04-10 NOTE — H&P PST ADULT - NSANTHOSAYNRD_GEN_A_CORE
No. SHARMIN screening performed.  STOP BANG Legend: 0-2 = LOW Risk; 3-4 = INTERMEDIATE Risk; 5-8 = HIGH Risk

## 2019-04-10 NOTE — H&P PST ADULT - NEGATIVE OPHTHALMOLOGIC SYMPTOMS
no loss of vision R/no pain L/no blurred vision R/no pain R/no loss of vision L/no diplopia/no photophobia/no blurred vision L

## 2019-04-11 ENCOUNTER — RESULT REVIEW (OUTPATIENT)
Age: 54
End: 2019-04-11

## 2019-04-11 ENCOUNTER — OUTPATIENT (OUTPATIENT)
Dept: OUTPATIENT SERVICES | Facility: HOSPITAL | Age: 54
LOS: 1 days | Discharge: HOME | End: 2019-04-11
Payer: COMMERCIAL

## 2019-04-11 VITALS
RESPIRATION RATE: 14 BRPM | TEMPERATURE: 98 F | SYSTOLIC BLOOD PRESSURE: 105 MMHG | OXYGEN SATURATION: 98 % | HEART RATE: 67 BPM | DIASTOLIC BLOOD PRESSURE: 55 MMHG

## 2019-04-11 VITALS
SYSTOLIC BLOOD PRESSURE: 102 MMHG | HEART RATE: 62 BPM | TEMPERATURE: 98 F | DIASTOLIC BLOOD PRESSURE: 62 MMHG | RESPIRATION RATE: 16 BRPM | WEIGHT: 156.97 LBS | HEIGHT: 63 IN | OXYGEN SATURATION: 97 %

## 2019-04-11 DIAGNOSIS — Z98.890 OTHER SPECIFIED POSTPROCEDURAL STATES: Chronic | ICD-10-CM

## 2019-04-11 DIAGNOSIS — L05.02 PILONIDAL SINUS WITH ABSCESS: ICD-10-CM

## 2019-04-11 DIAGNOSIS — Z90.89 ACQUIRED ABSENCE OF OTHER ORGANS: Chronic | ICD-10-CM

## 2019-04-11 DIAGNOSIS — Z90.49 ACQUIRED ABSENCE OF OTHER SPECIFIED PARTS OF DIGESTIVE TRACT: Chronic | ICD-10-CM

## 2019-04-11 DIAGNOSIS — Z98.84 BARIATRIC SURGERY STATUS: Chronic | ICD-10-CM

## 2019-04-11 DIAGNOSIS — Z98.89 OTHER SPECIFIED POSTPROCEDURAL STATES: Chronic | ICD-10-CM

## 2019-04-11 LAB — GLUCOSE BLDC GLUCOMTR-MCNC: 91 MG/DL — SIGNIFICANT CHANGE UP (ref 70–99)

## 2019-04-11 PROCEDURE — 88304 TISSUE EXAM BY PATHOLOGIST: CPT | Mod: 26

## 2019-04-11 RX ADMIN — SODIUM CHLORIDE 30 MILLILITER(S): 9 INJECTION, SOLUTION INTRAVENOUS at 12:40

## 2019-04-11 NOTE — BRIEF OPERATIVE NOTE - ASSISTANT(S)
[facility] ED MRN [id number] Dominga Nunez, Fede Campoverde

## 2019-04-11 NOTE — ASU DISCHARGE PLAN (ADULT/PEDIATRIC) - CARE PROVIDER_API CALL
Isaac Hankins)  ColonRectal Surgery; Surgery  [address], [address]  [place] [address]  Phone: [phone]  Fax: [phone]  Follow Up Time:

## 2019-04-11 NOTE — ASU DISCHARGE PLAN (ADULT/PEDIATRIC) - ASU DC SPECIAL INSTRUCTIONSFT
- Ice for swelling  - Leave steri strips in place  - Can take Ibuprofen or Aleve for pain, Percocet for breakthrough pain (do not take Tylenol with Percocet)  - Call to schedule post-op appointment with Dr. Hankins in 7-10 days - Ice for swelling  - Remove gauze and tape before showering tomorrow  - Can take Ibuprofen or Aleve for pain, Percocet for breakthrough pain (do not take Tylenol with Percocet)  - Call to schedule post-op appointment with Dr. Hankins in 7-10 days

## 2019-04-11 NOTE — ASU DISCHARGE PLAN (ADULT/PEDIATRIC) - CALL YOUR DOCTOR IF YOU HAVE ANY OF THE FOLLOWING:
Pain not relieved by Medications/Wound/Surgical Site with redness, or foul smelling discharge or pus/Bleeding that does not stop/Fever greater than (need to indicate Fahrenheit or Celsius) Fever greater than (need to indicate Fahrenheit or Celsius)/Pain not relieved by Medications/Wound/Surgical Site with redness, or foul smelling discharge or pus/Bleeding that does not stop/Swelling that gets worse

## 2019-04-11 NOTE — ASU DISCHARGE PLAN (ADULT/PEDIATRIC) - NURSING INSTRUCTIONS
If you experience pain not relieved by pain medication nausea vomiting drainage from the site call your doctor

## 2019-04-16 LAB — SURGICAL PATHOLOGY STUDY: SIGNIFICANT CHANGE UP

## 2019-04-18 ENCOUNTER — EMERGENCY (EMERGENCY)
Facility: HOSPITAL | Age: 54
LOS: 1 days | Discharge: HOME | End: 2019-04-18
Attending: EMERGENCY MEDICINE | Admitting: EMERGENCY MEDICINE
Payer: COMMERCIAL

## 2019-04-18 VITALS
RESPIRATION RATE: 16 BRPM | SYSTOLIC BLOOD PRESSURE: 119 MMHG | TEMPERATURE: 98 F | DIASTOLIC BLOOD PRESSURE: 86 MMHG | HEART RATE: 79 BPM | OXYGEN SATURATION: 100 %

## 2019-04-18 DIAGNOSIS — Z98.84 BARIATRIC SURGERY STATUS: Chronic | ICD-10-CM

## 2019-04-18 DIAGNOSIS — Z98.890 OTHER SPECIFIED POSTPROCEDURAL STATES: Chronic | ICD-10-CM

## 2019-04-18 DIAGNOSIS — Z90.49 ACQUIRED ABSENCE OF OTHER SPECIFIED PARTS OF DIGESTIVE TRACT: Chronic | ICD-10-CM

## 2019-04-18 DIAGNOSIS — Z98.89 OTHER SPECIFIED POSTPROCEDURAL STATES: Chronic | ICD-10-CM

## 2019-04-18 DIAGNOSIS — Z90.89 ACQUIRED ABSENCE OF OTHER ORGANS: Chronic | ICD-10-CM

## 2019-04-18 LAB
ALBUMIN SERPL ELPH-MCNC: 3.4 G/DL — SIGNIFICANT CHANGE UP (ref 3.3–5)
ALP SERPL-CCNC: 62 U/L — SIGNIFICANT CHANGE UP (ref 40–120)
ALT FLD-CCNC: 13 U/L — SIGNIFICANT CHANGE UP (ref 4–41)
ANION GAP SERPL CALC-SCNC: 8 MMO/L — SIGNIFICANT CHANGE UP (ref 7–14)
APTT BLD: 33.4 SEC — SIGNIFICANT CHANGE UP (ref 27.5–36.3)
AST SERPL-CCNC: 15 U/L — SIGNIFICANT CHANGE UP (ref 4–40)
BASOPHILS # BLD AUTO: 0.04 K/UL — SIGNIFICANT CHANGE UP (ref 0–0.2)
BASOPHILS NFR BLD AUTO: 0.6 % — SIGNIFICANT CHANGE UP (ref 0–2)
BILIRUB SERPL-MCNC: 0.5 MG/DL — SIGNIFICANT CHANGE UP (ref 0.2–1.2)
BLD GP AB SCN SERPL QL: NEGATIVE — SIGNIFICANT CHANGE UP
BUN SERPL-MCNC: 12 MG/DL — SIGNIFICANT CHANGE UP (ref 7–23)
CALCIUM SERPL-MCNC: 8.8 MG/DL — SIGNIFICANT CHANGE UP (ref 8.4–10.5)
CHLORIDE SERPL-SCNC: 103 MMOL/L — SIGNIFICANT CHANGE UP (ref 98–107)
CO2 SERPL-SCNC: 28 MMOL/L — SIGNIFICANT CHANGE UP (ref 22–31)
CREAT SERPL-MCNC: 0.78 MG/DL — SIGNIFICANT CHANGE UP (ref 0.5–1.3)
EOSINOPHIL # BLD AUTO: 0.11 K/UL — SIGNIFICANT CHANGE UP (ref 0–0.5)
EOSINOPHIL NFR BLD AUTO: 1.7 % — SIGNIFICANT CHANGE UP (ref 0–6)
GLUCOSE SERPL-MCNC: 118 MG/DL — HIGH (ref 70–99)
HCT VFR BLD CALC: 44.3 % — SIGNIFICANT CHANGE UP (ref 39–50)
HGB BLD-MCNC: 15 G/DL — SIGNIFICANT CHANGE UP (ref 13–17)
IMM GRANULOCYTES NFR BLD AUTO: 0.2 % — SIGNIFICANT CHANGE UP (ref 0–1.5)
INR BLD: 0.96 — SIGNIFICANT CHANGE UP (ref 0.88–1.17)
LYMPHOCYTES # BLD AUTO: 2.65 K/UL — SIGNIFICANT CHANGE UP (ref 1–3.3)
LYMPHOCYTES # BLD AUTO: 41.5 % — SIGNIFICANT CHANGE UP (ref 13–44)
MCHC RBC-ENTMCNC: 28.1 PG — SIGNIFICANT CHANGE UP (ref 27–34)
MCHC RBC-ENTMCNC: 33.9 % — SIGNIFICANT CHANGE UP (ref 32–36)
MCV RBC AUTO: 83 FL — SIGNIFICANT CHANGE UP (ref 80–100)
MONOCYTES # BLD AUTO: 0.52 K/UL — SIGNIFICANT CHANGE UP (ref 0–0.9)
MONOCYTES NFR BLD AUTO: 8.2 % — SIGNIFICANT CHANGE UP (ref 2–14)
NEUTROPHILS # BLD AUTO: 3.05 K/UL — SIGNIFICANT CHANGE UP (ref 1.8–7.4)
NEUTROPHILS NFR BLD AUTO: 47.8 % — SIGNIFICANT CHANGE UP (ref 43–77)
NRBC # FLD: 0 K/UL — SIGNIFICANT CHANGE UP (ref 0–0)
PLATELET # BLD AUTO: 197 K/UL — SIGNIFICANT CHANGE UP (ref 150–400)
PMV BLD: 9.7 FL — SIGNIFICANT CHANGE UP (ref 7–13)
POTASSIUM SERPL-MCNC: 3.6 MMOL/L — SIGNIFICANT CHANGE UP (ref 3.5–5.3)
POTASSIUM SERPL-SCNC: 3.6 MMOL/L — SIGNIFICANT CHANGE UP (ref 3.5–5.3)
PROT SERPL-MCNC: 6.4 G/DL — SIGNIFICANT CHANGE UP (ref 6–8.3)
PROTHROM AB SERPL-ACNC: 10.6 SEC — SIGNIFICANT CHANGE UP (ref 9.8–13.1)
RBC # BLD: 5.34 M/UL — SIGNIFICANT CHANGE UP (ref 4.2–5.8)
RBC # FLD: 12.9 % — SIGNIFICANT CHANGE UP (ref 10.3–14.5)
RH IG SCN BLD-IMP: POSITIVE — SIGNIFICANT CHANGE UP
SODIUM SERPL-SCNC: 139 MMOL/L — SIGNIFICANT CHANGE UP (ref 135–145)
WBC # BLD: 6.38 K/UL — SIGNIFICANT CHANGE UP (ref 3.8–10.5)
WBC # FLD AUTO: 6.38 K/UL — SIGNIFICANT CHANGE UP (ref 3.8–10.5)

## 2019-04-18 PROCEDURE — 71046 X-RAY EXAM CHEST 2 VIEWS: CPT | Mod: 26

## 2019-04-18 PROCEDURE — 99284 EMERGENCY DEPT VISIT MOD MDM: CPT

## 2019-04-18 RX ORDER — FAMOTIDINE 10 MG/ML
20 INJECTION INTRAVENOUS ONCE
Qty: 0 | Refills: 0 | Status: COMPLETED | OUTPATIENT
Start: 2019-04-18 | End: 2019-04-18

## 2019-04-18 RX ORDER — SODIUM CHLORIDE 9 MG/ML
1000 INJECTION INTRAMUSCULAR; INTRAVENOUS; SUBCUTANEOUS ONCE
Qty: 0 | Refills: 0 | Status: COMPLETED | OUTPATIENT
Start: 2019-04-18 | End: 2019-04-18

## 2019-04-18 RX ORDER — PANTOPRAZOLE SODIUM 20 MG/1
40 TABLET, DELAYED RELEASE ORAL ONCE
Qty: 0 | Refills: 0 | Status: COMPLETED | OUTPATIENT
Start: 2019-04-18 | End: 2019-04-18

## 2019-04-18 RX ORDER — ONDANSETRON 8 MG/1
4 TABLET, FILM COATED ORAL ONCE
Qty: 0 | Refills: 0 | Status: COMPLETED | OUTPATIENT
Start: 2019-04-18 | End: 2019-04-18

## 2019-04-18 RX ADMIN — PANTOPRAZOLE SODIUM 40 MILLIGRAM(S): 20 TABLET, DELAYED RELEASE ORAL at 21:20

## 2019-04-18 RX ADMIN — FAMOTIDINE 104 MILLIGRAM(S): 10 INJECTION INTRAVENOUS at 21:20

## 2019-04-18 RX ADMIN — SODIUM CHLORIDE 2000 MILLILITER(S): 9 INJECTION INTRAMUSCULAR; INTRAVENOUS; SUBCUTANEOUS at 21:20

## 2019-04-18 RX ADMIN — ONDANSETRON 4 MILLIGRAM(S): 8 TABLET, FILM COATED ORAL at 21:20

## 2019-04-18 NOTE — ED ADULT NURSE NOTE - CHIEF COMPLAINT QUOTE
Pt s/p lower tail bone cyst removal last Thursday, c/o N/V, sob, , intermittent chest pain, headache,  and dizziness since today.  Denies abd pain.  Pt on Augmentin

## 2019-04-18 NOTE — ED PROVIDER NOTE - OBJECTIVE STATEMENT
54 yoM, PMHx of asthma bariatric surgery at [facility] (unknown type or surgeons name) in 2015, 1 week s/p pilonidal cyst removal, presents to ED with wife with report of N/V since this am with dark blood emesis. 5 episodes. Mild epigastric pain. No lower abd pain. No fever/chills or diarrhea. Mild cough. Currently on Augmentin for concern for infection at pilonidal cyst site. Does not smoke drink or use drugs. Denies blood in stool. No hx of GIB. No anticoagulation.

## 2019-04-18 NOTE — ED PROVIDER NOTE - PHYSICAL EXAMINATION
PHYSICAL EXAM:  GENERAL: NAD, well-groomed, well-developed  HEAD:  Atraumatic, Normocephalic  EYES: EOMI, PERRLA, conjunctiva and sclera clear  ENMT: No tonsillar erythema, exudates, or enlargement; Moist mucous membranes  NECK: Supple, No JVD, Normal thyroid  HEART: Regular rate and rhythm; No murmurs, rubs, or gallops  RESPIRATORY: CTA B/L, No W/R/R  ABDOMEN: Soft, Nontender, Nondistended; Bowel sounds present  NEURO: A&Ox3, nonfocal, moving all extremities  EXTREMITIES:  2+ Peripheral Pulses, No clubbing, cyanosis, or edema  SKIN: warm, dry, normal color, no rash PHYSICAL EXAM:  GENERAL: mild distress, laying on L side, well-groomed, well-developed  HEAD:  Atraumatic, Normocephalic  EYES: EOMI, PERRLA, conjunctiva and sclera clear  ENMT: No tonsillar erythema, exudates, or enlargement; Moist mucous membranes, no blood in oropharynx   NECK: Supple, No JVD  HEART: Regular rate and rhythm; No murmurs, rubs, or gallops  RESPIRATORY: CTA B/L, No W/R/R  ABDOMEN: Soft, Nontender, no rebound/guarding  NEURO: A&Ox3, nonfocal, moving all extremities  EXTREMITIES:  2+ Peripheral Pulses, No clubbing, cyanosis, or edema  SKIN: warm, dry, normal color, no rash, surgical site at gluteal crest has stitches in past, no drainage, no erythema

## 2019-04-18 NOTE — ED ADULT NURSE NOTE - OBJECTIVE STATEMENT
Pt received into intake A&Ox4, ambulatory at baseline bu limited by pain C/O ABD pain, N/V with black colored vomit x 1day. PT has PMH: Sleeve gastrectomy 2015, lalit- anal abscess I&D on Thursday: site appears CDI approximated with sutures. Denies diarrhea, fevers chills, diaphoresis, CP. MD evaluated, 18 G IV placed to R AC, labs sent, medicated as ordered. Comfort measures provided. Pt pending CT scan at this time. Will continue to monitor for safety and comfort.

## 2019-04-18 NOTE — ED PROVIDER NOTE - PROGRESS NOTE DETAILS
[place] PGY1- surgery in ED, they will evaluate pt Jose Juan: CT shows no acute pathology; hemoglobin stable; no vomiting here; surgery has cleared pt to be discharged; will d/c home; understands precautions to return

## 2019-04-18 NOTE — ED PROVIDER NOTE - NSFOLLOWUPINSTRUCTIONS_ED_ALL_ED_FT
Follow up with your primary care physician in the next 48 hours.  Return immediately if you sense you are bleeding (vomiting of blood, black or red stool)

## 2019-04-18 NOTE — ED PROVIDER NOTE - ATTENDING CONTRIBUTION TO CARE
54M h/o asthma, bariatric surgery presents with abdominal pain, n/v w dark emesis. Symptoms started today. No prior h/o GI bleed. Of note is 1wk post op from pilonidal cyst removal, currently on Augmentin. No fever. No diarrhea. No melena. On exam well appearing, nad, mmm, rrr, lungs clear, abd soft NT/ND, 2+ pulses, no edema, no rash, alert, speech clear. Plan for labs, CT abd, surgery c/s.

## 2019-04-18 NOTE — ED PROVIDER NOTE - CLINICAL SUMMARY MEDICAL DECISION MAKING FREE TEXT BOX
Wilbert PGY1- 54 yoM, pmhx of bariatric surgery 2015, 1 week s/p pilonidal cyst, dark bloody emesis with epigastric pain and nausea since this am. no fever/chills. Denies hx of GIB. No blood in stool. Mild cough, on augmentin for surgical site infection  uncomfortable VSS, not tachy, abd soft/nt, no blood in oropharynx, surgical site is CDI, guaic pending  labs, type, ivf, zofran, protonix, pepcid, ct a/p, cxr, surg consult  ddx: bleeding ulcer, complication from sleeve,

## 2019-04-18 NOTE — ED ADULT TRIAGE NOTE - CHIEF COMPLAINT QUOTE
Pt s/p lower back surgery last Thursday, c/o N/V, sob, , intermittent chest pain, headache,  and dizziness since today.  Denies abd pain Pt s/p lower tail bone cyst removal last Thursday, c/o N/V, sob, , intermittent chest pain, headache,  and dizziness since today.  Denies abd pain.  Pt on Augmentin

## 2019-04-19 PROBLEM — M54.5 LOW BACK PAIN: Chronic | Status: ACTIVE | Noted: 2019-04-10

## 2019-04-19 PROCEDURE — 74177 CT ABD & PELVIS W/CONTRAST: CPT | Mod: 26

## 2019-04-19 NOTE — CONSULT NOTE ADULT - ASSESSMENT
54M presents with three episodes of dark emesis    -no emesis in the ED  -nausea resolving  -h/h 15/44  -CT scan as per ED  -GI consultation for possible endoscopy  page [phone] with surgical questions    Henna Drummond, PGY-3

## 2019-04-19 NOTE — CONSULT NOTE ADULT - SUBJECTIVE AND OBJECTIVE BOX
GENERAL SURGERY CONSULT NOTE    54M presents with three episodes of dark emesis. Patient states he was in his usual state of health until this am, when he developed nausea. Since then, he has had three episodes of dark emesis. No bright blood in his emesis, no clots. Has never had this before. Denies abdominal pain, diarrhea, had a normal bowel movement and passed flatus this am. No fevers or chills.  Patient has surgical history significant for sleeve gastrectomy 4 years ago at [facility] (doesn't remember surgeon), after which he lost 75 pounds. Recent surgical history also significant for pilonidal cyst removal 7 days ago by Dr. Hankins. Last endoscopy/ colonoscopy 2018 for vague abdominal pain, reportedly normal.  In the ER, patient with normal vital signs and labs. H/H 15/44 Abdomen soft, not tender. Pilonidal excision site clean and dry with nylons in place.     PAST MEDICAL & SURGICAL HISTORY:  Lower back pain  Pilonidal cyst with abscess  SHARMIN (obstructive sleep apnea): resolved after weight loss surgery  Asthma: intubated x 2 in 1992, hospitalized in 2011, currently stable, last used rescue inhaler 7 months ago  Hypertension: on diet control  DM (diabetes mellitus): resolved with weight loss surgery  H/O: Obesity: s/p gastric sleeve in 2015, lost ~75 lbs  S/P sinus surgery: with septoplasty  S/P laparoscopic sleeve gastrectomy: 2015  S/P hernia repair: bilateral inguinal hernia repair  S/P appendectomy  S/P tonsillectomy    FAMILY HISTORY:  No pertinent family history in first degree relatives      SOCIAL HISTORY:    MEDICATIONS  (STANDING):    MEDICATIONS  (PRN):    Allergies    Avelox (Unknown)    Intolerances    PHYSICAL EXAM    Vital Signs Last 24 Hrs  T(C): 36.8 (18 Apr 2019 19:05), Max: 36.8 (18 Apr 2019 19:05)  T(F): 98.2 (18 Apr 2019 19:05), Max: 98.2 (18 Apr 2019 19:05)  HR: 79 (18 Apr 2019 19:05) (79 - 79)  BP: 119/86 (18 Apr 2019 19:05) (119/86 - 119/86)  BP(mean): --  RR: 16 (18 Apr 2019 19:05) (16 - 16)  SpO2: 100% (18 Apr 2019 19:05) (100% - 100%)  Daily     Daily     General: WN/WD NAD  Neurology: A&Ox3, nonfocal, DIAZ x 4  Head:  Normocephalic, atraumatic  ENT:  Mucosa moist, no ulcerations  Neck:  Supple, no sinuses or palpable masses  Lymphatic:  No palpable cervical, supraclavicular, axillary or inguinal adenopathy  Respiratory: CTA B/L  CV: RRR, S1S2, no murmur  Abdominal: Soft, NT, ND no palpable mass  MSK: No edema, + peripheral pulses, FROM all 4 extremity  well healed laparoscopy scars                          15.0   6.38  )-----------( 197      ( 18 Apr 2019 21:35 )             44.3     04-18    139  |  103  |  12  ----------------------------<  118<H>  3.6   |  28  |  0.78    Ca    8.8      18 Apr 2019 21:35    TPro  6.4  /  Alb  3.4  /  TBili  0.5  /  DBili  x   /  AST  15  /  ALT  13  /  AlkPhos  62  04-18    PT/INR - ( 18 Apr 2019 21:35 )   PT: 10.6 SEC;   INR: 0.96          PTT - ( 18 Apr 2019 21:35 )  PTT:33.4 SEC      IMAGING STUDIES:

## 2019-04-24 ENCOUNTER — APPOINTMENT (OUTPATIENT)
Dept: ULTRASOUND IMAGING | Facility: CLINIC | Age: 54
End: 2019-04-24
Payer: COMMERCIAL

## 2019-04-24 ENCOUNTER — OUTPATIENT (OUTPATIENT)
Dept: OUTPATIENT SERVICES | Facility: HOSPITAL | Age: 54
LOS: 1 days | End: 2019-04-24
Payer: COMMERCIAL

## 2019-04-24 DIAGNOSIS — Z90.49 ACQUIRED ABSENCE OF OTHER SPECIFIED PARTS OF DIGESTIVE TRACT: Chronic | ICD-10-CM

## 2019-04-24 DIAGNOSIS — Z00.8 ENCOUNTER FOR OTHER GENERAL EXAMINATION: ICD-10-CM

## 2019-04-24 DIAGNOSIS — Z98.890 OTHER SPECIFIED POSTPROCEDURAL STATES: Chronic | ICD-10-CM

## 2019-04-24 DIAGNOSIS — Z90.89 ACQUIRED ABSENCE OF OTHER ORGANS: Chronic | ICD-10-CM

## 2019-04-24 DIAGNOSIS — Z98.84 BARIATRIC SURGERY STATUS: Chronic | ICD-10-CM

## 2019-04-24 DIAGNOSIS — Z98.89 OTHER SPECIFIED POSTPROCEDURAL STATES: Chronic | ICD-10-CM

## 2019-04-24 PROCEDURE — 76700 US EXAM ABDOM COMPLETE: CPT

## 2019-04-24 PROCEDURE — 76700 US EXAM ABDOM COMPLETE: CPT | Mod: 26

## 2019-05-15 ENCOUNTER — APPOINTMENT (OUTPATIENT)
Dept: GASTROENTEROLOGY | Facility: CLINIC | Age: 54
End: 2019-05-15

## 2019-05-23 ENCOUNTER — OUTPATIENT (OUTPATIENT)
Dept: OUTPATIENT SERVICES | Facility: HOSPITAL | Age: 54
LOS: 1 days | Discharge: HOME | End: 2019-05-23

## 2019-05-23 VITALS — HEIGHT: 63 IN | WEIGHT: 156.09 LBS

## 2019-05-23 DIAGNOSIS — K21.9 GASTRO-ESOPHAGEAL REFLUX DISEASE WITHOUT ESOPHAGITIS: ICD-10-CM

## 2019-05-23 DIAGNOSIS — Z98.89 OTHER SPECIFIED POSTPROCEDURAL STATES: Chronic | ICD-10-CM

## 2019-05-23 DIAGNOSIS — E66.01 MORBID (SEVERE) OBESITY DUE TO EXCESS CALORIES: ICD-10-CM

## 2019-05-23 DIAGNOSIS — Z85.6 PERSONAL HISTORY OF LEUKEMIA: Chronic | ICD-10-CM

## 2019-05-23 DIAGNOSIS — Z98.84 BARIATRIC SURGERY STATUS: Chronic | ICD-10-CM

## 2019-05-23 DIAGNOSIS — Z90.49 ACQUIRED ABSENCE OF OTHER SPECIFIED PARTS OF DIGESTIVE TRACT: Chronic | ICD-10-CM

## 2019-05-23 DIAGNOSIS — Z90.89 ACQUIRED ABSENCE OF OTHER ORGANS: Chronic | ICD-10-CM

## 2019-05-23 DIAGNOSIS — Z98.890 OTHER SPECIFIED POSTPROCEDURAL STATES: Chronic | ICD-10-CM

## 2019-05-23 LAB
ANION GAP SERPL CALC-SCNC: 6 MMOL/L — SIGNIFICANT CHANGE UP (ref 5–17)
APPEARANCE UR: ABNORMAL
APTT BLD: 31 SEC — SIGNIFICANT CHANGE UP (ref 27.5–36.3)
BASOPHILS # BLD AUTO: 0.04 K/UL — SIGNIFICANT CHANGE UP (ref 0–0.2)
BASOPHILS NFR BLD AUTO: 0.6 % — SIGNIFICANT CHANGE UP (ref 0–2)
BILIRUB UR-MCNC: ABNORMAL
BUN SERPL-MCNC: 15 MG/DL — SIGNIFICANT CHANGE UP (ref 7–23)
CALCIUM SERPL-MCNC: 8.8 MG/DL — SIGNIFICANT CHANGE UP (ref 8.5–10.1)
CHLORIDE SERPL-SCNC: 105 MMOL/L — SIGNIFICANT CHANGE UP (ref 96–108)
CO2 SERPL-SCNC: 31 MMOL/L — SIGNIFICANT CHANGE UP (ref 22–31)
COD CRY URNS QL: ABNORMAL
COLOR SPEC: YELLOW — SIGNIFICANT CHANGE UP
CREAT SERPL-MCNC: 0.79 MG/DL — SIGNIFICANT CHANGE UP (ref 0.5–1.3)
DIFF PNL FLD: NEGATIVE — SIGNIFICANT CHANGE UP
EOSINOPHIL # BLD AUTO: 0.08 K/UL — SIGNIFICANT CHANGE UP (ref 0–0.5)
EOSINOPHIL NFR BLD AUTO: 1.2 % — SIGNIFICANT CHANGE UP (ref 0–6)
GLUCOSE SERPL-MCNC: 93 MG/DL — SIGNIFICANT CHANGE UP (ref 70–99)
GLUCOSE UR QL: NEGATIVE MG/DL — SIGNIFICANT CHANGE UP
HCT VFR BLD CALC: 45.5 % — SIGNIFICANT CHANGE UP (ref 39–50)
HGB BLD-MCNC: 15.5 G/DL — SIGNIFICANT CHANGE UP (ref 13–17)
IMM GRANULOCYTES NFR BLD AUTO: 0.6 % — SIGNIFICANT CHANGE UP (ref 0–1.5)
INR BLD: 0.99 RATIO — SIGNIFICANT CHANGE UP (ref 0.88–1.16)
KETONES UR-MCNC: ABNORMAL
LEUKOCYTE ESTERASE UR-ACNC: ABNORMAL
LYMPHOCYTES # BLD AUTO: 2.1 K/UL — SIGNIFICANT CHANGE UP (ref 1–3.3)
LYMPHOCYTES # BLD AUTO: 30.4 % — SIGNIFICANT CHANGE UP (ref 13–44)
MCHC RBC-ENTMCNC: 28.7 PG — SIGNIFICANT CHANGE UP (ref 27–34)
MCHC RBC-ENTMCNC: 34.1 GM/DL — SIGNIFICANT CHANGE UP (ref 32–36)
MCV RBC AUTO: 84.3 FL — SIGNIFICANT CHANGE UP (ref 80–100)
MONOCYTES # BLD AUTO: 0.55 K/UL — SIGNIFICANT CHANGE UP (ref 0–0.9)
MONOCYTES NFR BLD AUTO: 8 % — SIGNIFICANT CHANGE UP (ref 2–14)
NEUTROPHILS # BLD AUTO: 4.1 K/UL — SIGNIFICANT CHANGE UP (ref 1.8–7.4)
NEUTROPHILS NFR BLD AUTO: 59.2 % — SIGNIFICANT CHANGE UP (ref 43–77)
NITRITE UR-MCNC: NEGATIVE — SIGNIFICANT CHANGE UP
PH UR: 6.5 — SIGNIFICANT CHANGE UP (ref 5–8)
PLATELET # BLD AUTO: 206 K/UL — SIGNIFICANT CHANGE UP (ref 150–400)
POTASSIUM SERPL-MCNC: 4.2 MMOL/L — SIGNIFICANT CHANGE UP (ref 3.5–5.3)
POTASSIUM SERPL-SCNC: 4.2 MMOL/L — SIGNIFICANT CHANGE UP (ref 3.5–5.3)
PROT UR-MCNC: 15 MG/DL
PROTHROM AB SERPL-ACNC: 11 SEC — SIGNIFICANT CHANGE UP (ref 10–12.9)
RBC # BLD: 5.4 M/UL — SIGNIFICANT CHANGE UP (ref 4.2–5.8)
RBC # FLD: 13 % — SIGNIFICANT CHANGE UP (ref 10.3–14.5)
RBC CASTS # UR COMP ASSIST: NEGATIVE /HPF — SIGNIFICANT CHANGE UP (ref 0–4)
SODIUM SERPL-SCNC: 142 MMOL/L — SIGNIFICANT CHANGE UP (ref 135–145)
SP GR SPEC: 1.01 — SIGNIFICANT CHANGE UP (ref 1.01–1.02)
UROBILINOGEN FLD QL: 4 MG/DL
WBC # BLD: 6.91 K/UL — SIGNIFICANT CHANGE UP (ref 3.8–10.5)
WBC # FLD AUTO: 6.91 K/UL — SIGNIFICANT CHANGE UP (ref 3.8–10.5)
WBC UR QL: SIGNIFICANT CHANGE UP

## 2019-05-23 NOTE — H&P PST ADULT - VISION (WITH CORRECTIVE LENSES IF THE PATIENT USUALLY WEARS THEM):
with glasses/Partially impaired: cannot see medication labels or newsprint, but can see obstacles in path, and the surrounding layout; can count fingers at arm's length

## 2019-05-23 NOTE — H&P PST ADULT - HISTORY OF PRESENT ILLNESS
53 y/o male with GERD. Pt with PMH of obesity S/P Lap sleeve gastrectomy in 2016. Complain of abdominal pain, denies nausea/vomiting. Scheduled for EGD.

## 2019-05-23 NOTE — H&P PST ADULT - NSICDXPASTMEDICALHX_GEN_ALL_CORE_FT
PAST MEDICAL HISTORY:  Asthma intubated x 2 in 1992, hospitalized in 2011, currently stable, last used rescue inhaler 7 months ago    DM (diabetes mellitus) resolved with weight loss surgery    GERD (gastroesophageal reflux disease)     H/O: Obesity s/p gastric sleeve in 2015, lost ~75 lbs    Hypertension on diet control    Lower back pain     SHARMIN (obstructive sleep apnea) resolved after weight loss surgery    Pilonidal cyst with abscess had surgery

## 2019-05-23 NOTE — H&P PST ADULT - NSICDXFAMILYHX_GEN_ALL_CORE_FT
FAMILY HISTORY:  Family history of cerebrovascular accident (CVA) in father  Family history of diabetes mellitus (DM), mother - also CVA  FH: leukemia, brother

## 2019-05-23 NOTE — H&P PST ADULT - ASSESSMENT
Plan:    1. NPO post midnight  2. Labs, EKG as per Dr. Lafleur 53 y/o male with GERD. Pt with PMH of obesity S/P Lap sleeve gastrectomy in 2016. Scheduled for EGD.   Plan:  1. NPO post midnight  2. Labs as per Dr. Lafleur

## 2019-05-23 NOTE — H&P PST ADULT - NSALCOHOLPROBLEMSRELYN_GEN_A_CORE_SD
I called and updated pt on his echo results,  I advised his EF was a little lower 20-25% from 25-30% last echo, and that the 2 Mitraclips were functioning well, 2+ MR noted - unchanged form previous echo. Mean grad 3mm.   He verbalized understanding, and wi
no

## 2019-05-24 ENCOUNTER — OUTPATIENT (OUTPATIENT)
Dept: OUTPATIENT SERVICES | Facility: HOSPITAL | Age: 54
LOS: 1 days | Discharge: HOME | End: 2019-05-24
Payer: COMMERCIAL

## 2019-05-24 ENCOUNTER — RESULT REVIEW (OUTPATIENT)
Age: 54
End: 2019-05-24

## 2019-05-24 VITALS
OXYGEN SATURATION: 99 % | DIASTOLIC BLOOD PRESSURE: 69 MMHG | HEIGHT: 63 IN | HEART RATE: 60 BPM | WEIGHT: 156.09 LBS | TEMPERATURE: 97 F | SYSTOLIC BLOOD PRESSURE: 114 MMHG | RESPIRATION RATE: 18 BRPM

## 2019-05-24 DIAGNOSIS — Z90.49 ACQUIRED ABSENCE OF OTHER SPECIFIED PARTS OF DIGESTIVE TRACT: Chronic | ICD-10-CM

## 2019-05-24 DIAGNOSIS — Z90.89 ACQUIRED ABSENCE OF OTHER ORGANS: Chronic | ICD-10-CM

## 2019-05-24 DIAGNOSIS — Z98.89 OTHER SPECIFIED POSTPROCEDURAL STATES: Chronic | ICD-10-CM

## 2019-05-24 DIAGNOSIS — Z98.890 OTHER SPECIFIED POSTPROCEDURAL STATES: Chronic | ICD-10-CM

## 2019-05-24 DIAGNOSIS — Z98.84 BARIATRIC SURGERY STATUS: Chronic | ICD-10-CM

## 2019-05-24 PROCEDURE — 88312 SPECIAL STAINS GROUP 1: CPT | Mod: 26

## 2019-05-24 PROCEDURE — 88305 TISSUE EXAM BY PATHOLOGIST: CPT | Mod: 26

## 2019-05-24 PROCEDURE — 88313 SPECIAL STAINS GROUP 2: CPT | Mod: 26

## 2019-05-24 NOTE — ASU PATIENT PROFILE, ADULT - PMH
Asthma  intubated x 2 in 1992, hospitalized in 2011, currently stable, last used rescue inhaler 7 months ago  DM (diabetes mellitus)  resolved with weight loss surgery  GERD (gastroesophageal reflux disease)    H/O: Obesity  s/p gastric sleeve in 2015, lost ~75 lbs  Hypertension  on diet control  Lower back pain    SHARMIN (obstructive sleep apnea)  resolved after weight loss surgery  Pilonidal cyst with abscess  had surgery

## 2019-05-28 LAB — SURGICAL PATHOLOGY STUDY: SIGNIFICANT CHANGE UP

## 2019-05-30 DIAGNOSIS — Z98.84 BARIATRIC SURGERY STATUS: ICD-10-CM

## 2019-05-30 DIAGNOSIS — K29.50 UNSPECIFIED CHRONIC GASTRITIS WITHOUT BLEEDING: ICD-10-CM

## 2019-05-30 DIAGNOSIS — M54.5 LOW BACK PAIN: ICD-10-CM

## 2019-05-30 DIAGNOSIS — Z88.1 ALLERGY STATUS TO OTHER ANTIBIOTIC AGENTS STATUS: ICD-10-CM

## 2019-05-30 DIAGNOSIS — K21.0 GASTRO-ESOPHAGEAL REFLUX DISEASE WITH ESOPHAGITIS: ICD-10-CM

## 2019-05-30 DIAGNOSIS — J45.909 UNSPECIFIED ASTHMA, UNCOMPLICATED: ICD-10-CM

## 2019-05-30 DIAGNOSIS — G47.33 OBSTRUCTIVE SLEEP APNEA (ADULT) (PEDIATRIC): ICD-10-CM

## 2019-05-30 DIAGNOSIS — R13.10 DYSPHAGIA, UNSPECIFIED: ICD-10-CM

## 2019-05-30 DIAGNOSIS — I10 ESSENTIAL (PRIMARY) HYPERTENSION: ICD-10-CM

## 2019-05-30 DIAGNOSIS — E11.9 TYPE 2 DIABETES MELLITUS WITHOUT COMPLICATIONS: ICD-10-CM

## 2019-05-30 DIAGNOSIS — E66.9 OBESITY, UNSPECIFIED: ICD-10-CM

## 2019-05-30 DIAGNOSIS — Z90.49 ACQUIRED ABSENCE OF OTHER SPECIFIED PARTS OF DIGESTIVE TRACT: ICD-10-CM

## 2019-06-07 PROBLEM — K21.9 GASTRO-ESOPHAGEAL REFLUX DISEASE WITHOUT ESOPHAGITIS: Chronic | Status: ACTIVE | Noted: 2019-05-23

## 2019-06-07 PROBLEM — L05.01 PILONIDAL CYST WITH ABSCESS: Chronic | Status: ACTIVE | Noted: 2019-04-10

## 2019-06-24 ENCOUNTER — OUTPATIENT (OUTPATIENT)
Dept: OUTPATIENT SERVICES | Facility: HOSPITAL | Age: 54
LOS: 1 days | End: 2019-06-24
Payer: COMMERCIAL

## 2019-06-24 ENCOUNTER — APPOINTMENT (OUTPATIENT)
Dept: MRI IMAGING | Facility: IMAGING CENTER | Age: 54
End: 2019-06-24
Payer: COMMERCIAL

## 2019-06-24 DIAGNOSIS — Z00.8 ENCOUNTER FOR OTHER GENERAL EXAMINATION: ICD-10-CM

## 2019-06-24 DIAGNOSIS — Z98.890 OTHER SPECIFIED POSTPROCEDURAL STATES: Chronic | ICD-10-CM

## 2019-06-24 DIAGNOSIS — Z90.89 ACQUIRED ABSENCE OF OTHER ORGANS: Chronic | ICD-10-CM

## 2019-06-24 DIAGNOSIS — Z98.89 OTHER SPECIFIED POSTPROCEDURAL STATES: Chronic | ICD-10-CM

## 2019-06-24 DIAGNOSIS — Z90.49 ACQUIRED ABSENCE OF OTHER SPECIFIED PARTS OF DIGESTIVE TRACT: Chronic | ICD-10-CM

## 2019-06-24 DIAGNOSIS — Z98.84 BARIATRIC SURGERY STATUS: Chronic | ICD-10-CM

## 2019-06-24 PROCEDURE — 72141 MRI NECK SPINE W/O DYE: CPT

## 2019-06-24 PROCEDURE — 72141 MRI NECK SPINE W/O DYE: CPT | Mod: 26

## 2020-02-07 NOTE — ED PROVIDER NOTE - PMH
----- Message from Bette Day sent at 2/7/2020  1:27 PM CST -----    Type:  Pharmacy Calling to Clarify an RX    Name of Caller:  yoanna  Pharmacy Name:    [facility]  [address] [place] [address]  Phone: [phone] Fax: [phone]  Prescription Name:  cosentyx 150 mg  What do they need to clarify?:      Follow  Up  On  PA  Best Call Back Number: [phone]  Additional Information:    Calling to  Follow  Up    
Asthma    Asthma  - intubated x 2  - in 1996 - admitted in 2011 for asthma - on singulair , zyflo and advair    Diabetes mellitus    DM (diabetes mellitus)    H/O: Obesity    Hypertension  pt denies but is listed on Medical clearance  Seasonal Allergies on singulair    Sleep Apnea has sleep studies in 2010  - was given CPAP machine but does not use it

## 2020-06-17 ENCOUNTER — APPOINTMENT (OUTPATIENT)
Dept: RADIOLOGY | Facility: IMAGING CENTER | Age: 55
End: 2020-06-17
Payer: COMMERCIAL

## 2020-06-17 ENCOUNTER — OUTPATIENT (OUTPATIENT)
Dept: OUTPATIENT SERVICES | Facility: HOSPITAL | Age: 55
LOS: 1 days | End: 2020-06-17
Payer: COMMERCIAL

## 2020-06-17 DIAGNOSIS — Z90.49 ACQUIRED ABSENCE OF OTHER SPECIFIED PARTS OF DIGESTIVE TRACT: Chronic | ICD-10-CM

## 2020-06-17 DIAGNOSIS — Z00.8 ENCOUNTER FOR OTHER GENERAL EXAMINATION: ICD-10-CM

## 2020-06-17 DIAGNOSIS — Z90.89 ACQUIRED ABSENCE OF OTHER ORGANS: Chronic | ICD-10-CM

## 2020-06-17 DIAGNOSIS — Z98.89 OTHER SPECIFIED POSTPROCEDURAL STATES: Chronic | ICD-10-CM

## 2020-06-17 DIAGNOSIS — Z98.84 BARIATRIC SURGERY STATUS: Chronic | ICD-10-CM

## 2020-06-17 DIAGNOSIS — Z98.890 OTHER SPECIFIED POSTPROCEDURAL STATES: Chronic | ICD-10-CM

## 2020-06-17 PROCEDURE — 72050 X-RAY EXAM NECK SPINE 4/5VWS: CPT | Mod: 26

## 2020-06-17 PROCEDURE — 72050 X-RAY EXAM NECK SPINE 4/5VWS: CPT

## 2020-10-27 ENCOUNTER — TRANSCRIPTION ENCOUNTER (OUTPATIENT)
Age: 55
End: 2020-10-27

## 2020-12-07 ENCOUNTER — APPOINTMENT (OUTPATIENT)
Dept: CT IMAGING | Facility: IMAGING CENTER | Age: 55
End: 2020-12-07
Payer: COMMERCIAL

## 2020-12-07 ENCOUNTER — OUTPATIENT (OUTPATIENT)
Dept: OUTPATIENT SERVICES | Facility: HOSPITAL | Age: 55
LOS: 1 days | End: 2020-12-07
Payer: COMMERCIAL

## 2020-12-07 DIAGNOSIS — R10.9 UNSPECIFIED ABDOMINAL PAIN: ICD-10-CM

## 2020-12-07 DIAGNOSIS — Z98.84 BARIATRIC SURGERY STATUS: ICD-10-CM

## 2020-12-07 DIAGNOSIS — R73.03 PREDIABETES: ICD-10-CM

## 2020-12-07 DIAGNOSIS — D64.9 ANEMIA, UNSPECIFIED: ICD-10-CM

## 2020-12-07 DIAGNOSIS — K55.032: ICD-10-CM

## 2020-12-07 DIAGNOSIS — Z98.84 BARIATRIC SURGERY STATUS: Chronic | ICD-10-CM

## 2020-12-07 DIAGNOSIS — Z90.89 ACQUIRED ABSENCE OF OTHER ORGANS: Chronic | ICD-10-CM

## 2020-12-07 DIAGNOSIS — Z98.89 OTHER SPECIFIED POSTPROCEDURAL STATES: Chronic | ICD-10-CM

## 2020-12-07 DIAGNOSIS — Z90.49 ACQUIRED ABSENCE OF OTHER SPECIFIED PARTS OF DIGESTIVE TRACT: Chronic | ICD-10-CM

## 2020-12-07 DIAGNOSIS — E78.5 HYPERLIPIDEMIA, UNSPECIFIED: ICD-10-CM

## 2020-12-07 DIAGNOSIS — Z98.890 OTHER SPECIFIED POSTPROCEDURAL STATES: Chronic | ICD-10-CM

## 2020-12-07 DIAGNOSIS — Z00.8 ENCOUNTER FOR OTHER GENERAL EXAMINATION: ICD-10-CM

## 2020-12-07 PROCEDURE — 74177 CT ABD & PELVIS W/CONTRAST: CPT

## 2020-12-07 PROCEDURE — 74177 CT ABD & PELVIS W/CONTRAST: CPT | Mod: 26

## 2021-03-10 ENCOUNTER — OUTPATIENT (OUTPATIENT)
Dept: OUTPATIENT SERVICES | Facility: HOSPITAL | Age: 56
LOS: 1 days | End: 2021-03-10
Payer: COMMERCIAL

## 2021-03-10 ENCOUNTER — APPOINTMENT (OUTPATIENT)
Dept: ULTRASOUND IMAGING | Facility: IMAGING CENTER | Age: 56
End: 2021-03-10
Payer: COMMERCIAL

## 2021-03-10 DIAGNOSIS — Z98.89 OTHER SPECIFIED POSTPROCEDURAL STATES: Chronic | ICD-10-CM

## 2021-03-10 DIAGNOSIS — Z98.84 BARIATRIC SURGERY STATUS: Chronic | ICD-10-CM

## 2021-03-10 DIAGNOSIS — Z90.49 ACQUIRED ABSENCE OF OTHER SPECIFIED PARTS OF DIGESTIVE TRACT: Chronic | ICD-10-CM

## 2021-03-10 DIAGNOSIS — R10.9 UNSPECIFIED ABDOMINAL PAIN: ICD-10-CM

## 2021-03-10 DIAGNOSIS — Z90.89 ACQUIRED ABSENCE OF OTHER ORGANS: Chronic | ICD-10-CM

## 2021-03-10 DIAGNOSIS — Z98.890 OTHER SPECIFIED POSTPROCEDURAL STATES: Chronic | ICD-10-CM

## 2021-03-10 PROCEDURE — 76700 US EXAM ABDOM COMPLETE: CPT

## 2021-03-10 PROCEDURE — 76700 US EXAM ABDOM COMPLETE: CPT | Mod: 26

## 2021-04-06 NOTE — H&P PST ADULT - NS PRO FEM  PAP SMEARS 3YRS
Patient had virtual visit 3/19/21, referral for sleep study made.       Last in person visit was 2/25/21, anxiety, stomach issues.    I don't see chronic gout not on problem list.  He does have chronic back issues per problem list.  I do see he is on allopurinol for hyperuricemia.    Uric Acid   Date Value Ref Range Status   02/23/2021 5.2 3.5 - 7.2 mg/dL Final     I called patient, he says he has pain from right hip to behind right knee starting a week ago.   Says he also has some pain to area behind left knee.   Has history of surgery to lower left leg, says is having pain to back of the left knee.    Denies lower leg swelling or shortness of breath, is at the gym now riding stationary bike.   Reports he's had gout to feet in the past.   Denies fever.   Had first covid vaccine a few weeks ago.   Advised office visit to assess symptoms and determine best plan.   No openings with PCP this week in clinic.   Scheduled same day with Jessy Victor today.    Patient verbalized understanding of and agreement with plan.    Brandee Torres RN  [facility]           not applicable (Male)

## 2021-11-12 ENCOUNTER — APPOINTMENT (OUTPATIENT)
Dept: CT IMAGING | Facility: IMAGING CENTER | Age: 56
End: 2021-11-12

## 2021-11-19 ENCOUNTER — APPOINTMENT (OUTPATIENT)
Dept: CT IMAGING | Facility: IMAGING CENTER | Age: 56
End: 2021-11-19
Payer: COMMERCIAL

## 2021-11-19 ENCOUNTER — OUTPATIENT (OUTPATIENT)
Dept: OUTPATIENT SERVICES | Facility: HOSPITAL | Age: 56
LOS: 1 days | End: 2021-11-19
Payer: COMMERCIAL

## 2021-11-19 DIAGNOSIS — Z98.890 OTHER SPECIFIED POSTPROCEDURAL STATES: Chronic | ICD-10-CM

## 2021-11-19 DIAGNOSIS — Z90.89 ACQUIRED ABSENCE OF OTHER ORGANS: Chronic | ICD-10-CM

## 2021-11-19 DIAGNOSIS — Z00.8 ENCOUNTER FOR OTHER GENERAL EXAMINATION: ICD-10-CM

## 2021-11-19 DIAGNOSIS — Z98.89 OTHER SPECIFIED POSTPROCEDURAL STATES: Chronic | ICD-10-CM

## 2021-11-19 DIAGNOSIS — Z90.49 ACQUIRED ABSENCE OF OTHER SPECIFIED PARTS OF DIGESTIVE TRACT: Chronic | ICD-10-CM

## 2021-11-19 DIAGNOSIS — Z98.84 BARIATRIC SURGERY STATUS: Chronic | ICD-10-CM

## 2021-11-19 PROCEDURE — 74177 CT ABD & PELVIS W/CONTRAST: CPT | Mod: 26

## 2021-11-19 PROCEDURE — 74177 CT ABD & PELVIS W/CONTRAST: CPT

## 2021-11-29 NOTE — ED ADULT NURSE NOTE - MUSCULOSKELETAL WDL
23-Nov-2021 11:04 Full range of motion of upper and lower extremities, no joint tenderness/swelling.

## 2022-02-09 ENCOUNTER — EMERGENCY (EMERGENCY)
Facility: HOSPITAL | Age: 57
LOS: 1 days | Discharge: HOME | End: 2022-02-09
Attending: EMERGENCY MEDICINE
Payer: COMMERCIAL

## 2022-02-09 VITALS
WEIGHT: 160.06 LBS | RESPIRATION RATE: 18 BRPM | TEMPERATURE: 99 F | HEIGHT: 63 IN | HEART RATE: 79 BPM | DIASTOLIC BLOOD PRESSURE: 87 MMHG | SYSTOLIC BLOOD PRESSURE: 138 MMHG | OXYGEN SATURATION: 97 %

## 2022-02-09 VITALS
DIASTOLIC BLOOD PRESSURE: 72 MMHG | TEMPERATURE: 98 F | HEART RATE: 75 BPM | RESPIRATION RATE: 18 BRPM | OXYGEN SATURATION: 97 % | SYSTOLIC BLOOD PRESSURE: 103 MMHG

## 2022-02-09 DIAGNOSIS — Z98.84 BARIATRIC SURGERY STATUS: Chronic | ICD-10-CM

## 2022-02-09 DIAGNOSIS — Z90.49 ACQUIRED ABSENCE OF OTHER SPECIFIED PARTS OF DIGESTIVE TRACT: Chronic | ICD-10-CM

## 2022-02-09 DIAGNOSIS — Z90.89 ACQUIRED ABSENCE OF OTHER ORGANS: Chronic | ICD-10-CM

## 2022-02-09 DIAGNOSIS — Z98.89 OTHER SPECIFIED POSTPROCEDURAL STATES: Chronic | ICD-10-CM

## 2022-02-09 DIAGNOSIS — Z98.890 OTHER SPECIFIED POSTPROCEDURAL STATES: Chronic | ICD-10-CM

## 2022-02-09 LAB
ALBUMIN SERPL ELPH-MCNC: 3.7 G/DL — SIGNIFICANT CHANGE UP (ref 3.3–5)
ALP SERPL-CCNC: 106 U/L — SIGNIFICANT CHANGE UP (ref 40–120)
ALT FLD-CCNC: 22 U/L — SIGNIFICANT CHANGE UP (ref 10–45)
ANION GAP SERPL CALC-SCNC: 10 MMOL/L — SIGNIFICANT CHANGE UP (ref 5–17)
APTT BLD: 32.4 SEC — SIGNIFICANT CHANGE UP (ref 27.5–35.5)
AST SERPL-CCNC: 23 U/L — SIGNIFICANT CHANGE UP (ref 10–40)
BASOPHILS # BLD AUTO: 0.02 K/UL — SIGNIFICANT CHANGE UP (ref 0–0.2)
BASOPHILS NFR BLD AUTO: 0.4 % — SIGNIFICANT CHANGE UP (ref 0–2)
BILIRUB SERPL-MCNC: 0.2 MG/DL — SIGNIFICANT CHANGE UP (ref 0.2–1.2)
BLD GP AB SCN SERPL QL: NEGATIVE — SIGNIFICANT CHANGE UP
BUN SERPL-MCNC: 14 MG/DL — SIGNIFICANT CHANGE UP (ref 7–23)
CALCIUM SERPL-MCNC: 8.8 MG/DL — SIGNIFICANT CHANGE UP (ref 8.4–10.5)
CHLORIDE SERPL-SCNC: 102 MMOL/L — SIGNIFICANT CHANGE UP (ref 96–108)
CO2 SERPL-SCNC: 25 MMOL/L — SIGNIFICANT CHANGE UP (ref 22–31)
CREAT SERPL-MCNC: 0.55 MG/DL — SIGNIFICANT CHANGE UP (ref 0.5–1.3)
EOSINOPHIL # BLD AUTO: 0.04 K/UL — SIGNIFICANT CHANGE UP (ref 0–0.5)
EOSINOPHIL NFR BLD AUTO: 0.7 % — SIGNIFICANT CHANGE UP (ref 0–6)
GLUCOSE SERPL-MCNC: 114 MG/DL — HIGH (ref 70–99)
HCT VFR BLD CALC: 41.8 % — SIGNIFICANT CHANGE UP (ref 39–50)
HGB BLD-MCNC: 13.2 G/DL — SIGNIFICANT CHANGE UP (ref 13–17)
IMM GRANULOCYTES NFR BLD AUTO: 0.4 % — SIGNIFICANT CHANGE UP (ref 0–1.5)
INR BLD: 0.95 RATIO — SIGNIFICANT CHANGE UP (ref 0.88–1.16)
LYMPHOCYTES # BLD AUTO: 1.98 K/UL — SIGNIFICANT CHANGE UP (ref 1–3.3)
LYMPHOCYTES # BLD AUTO: 36.3 % — SIGNIFICANT CHANGE UP (ref 13–44)
MCHC RBC-ENTMCNC: 24.6 PG — LOW (ref 27–34)
MCHC RBC-ENTMCNC: 31.6 GM/DL — LOW (ref 32–36)
MCV RBC AUTO: 78 FL — LOW (ref 80–100)
MONOCYTES # BLD AUTO: 0.34 K/UL — SIGNIFICANT CHANGE UP (ref 0–0.9)
MONOCYTES NFR BLD AUTO: 6.2 % — SIGNIFICANT CHANGE UP (ref 2–14)
NEUTROPHILS # BLD AUTO: 3.06 K/UL — SIGNIFICANT CHANGE UP (ref 1.8–7.4)
NEUTROPHILS NFR BLD AUTO: 56 % — SIGNIFICANT CHANGE UP (ref 43–77)
NRBC # BLD: 0 /100 WBCS — SIGNIFICANT CHANGE UP (ref 0–0)
PLATELET # BLD AUTO: 219 K/UL — SIGNIFICANT CHANGE UP (ref 150–400)
POTASSIUM SERPL-MCNC: 4.2 MMOL/L — SIGNIFICANT CHANGE UP (ref 3.5–5.3)
POTASSIUM SERPL-SCNC: 4.2 MMOL/L — SIGNIFICANT CHANGE UP (ref 3.5–5.3)
PROT SERPL-MCNC: 7.2 G/DL — SIGNIFICANT CHANGE UP (ref 6–8.3)
PROTHROM AB SERPL-ACNC: 11.4 SEC — SIGNIFICANT CHANGE UP (ref 10.6–13.6)
RBC # BLD: 5.36 M/UL — SIGNIFICANT CHANGE UP (ref 4.2–5.8)
RBC # FLD: 13.4 % — SIGNIFICANT CHANGE UP (ref 10.3–14.5)
RH IG SCN BLD-IMP: POSITIVE — SIGNIFICANT CHANGE UP
SODIUM SERPL-SCNC: 137 MMOL/L — SIGNIFICANT CHANGE UP (ref 135–145)
WBC # BLD: 5.46 K/UL — SIGNIFICANT CHANGE UP (ref 3.8–10.5)
WBC # FLD AUTO: 5.46 K/UL — SIGNIFICANT CHANGE UP (ref 3.8–10.5)

## 2022-02-09 PROCEDURE — 99284 EMERGENCY DEPT VISIT MOD MDM: CPT | Mod: 25

## 2022-02-09 PROCEDURE — 70450 CT HEAD/BRAIN W/O DYE: CPT | Mod: MG

## 2022-02-09 PROCEDURE — 85610 PROTHROMBIN TIME: CPT

## 2022-02-09 PROCEDURE — 74177 CT ABD & PELVIS W/CONTRAST: CPT | Mod: 26,MA

## 2022-02-09 PROCEDURE — 86922 COMPATIBILITY TEST ANTIGLOB: CPT

## 2022-02-09 PROCEDURE — 85730 THROMBOPLASTIN TIME PARTIAL: CPT

## 2022-02-09 PROCEDURE — 72129 CT CHEST SPINE W/DYE: CPT | Mod: 26,MA

## 2022-02-09 PROCEDURE — 70498 CT ANGIOGRAPHY NECK: CPT | Mod: 26,MG

## 2022-02-09 PROCEDURE — 72170 X-RAY EXAM OF PELVIS: CPT | Mod: 26

## 2022-02-09 PROCEDURE — 73020 X-RAY EXAM OF SHOULDER: CPT

## 2022-02-09 PROCEDURE — 85025 COMPLETE CBC W/AUTO DIFF WBC: CPT

## 2022-02-09 PROCEDURE — 71260 CT THORAX DX C+: CPT | Mod: 26,MG

## 2022-02-09 PROCEDURE — 36415 COLL VENOUS BLD VENIPUNCTURE: CPT

## 2022-02-09 PROCEDURE — 71045 X-RAY EXAM CHEST 1 VIEW: CPT | Mod: 26

## 2022-02-09 PROCEDURE — 70496 CT ANGIOGRAPHY HEAD: CPT | Mod: 26,MG

## 2022-02-09 PROCEDURE — 86900 BLOOD TYPING SEROLOGIC ABO: CPT

## 2022-02-09 PROCEDURE — 72170 X-RAY EXAM OF PELVIS: CPT

## 2022-02-09 PROCEDURE — G1004: CPT

## 2022-02-09 PROCEDURE — 86850 RBC ANTIBODY SCREEN: CPT

## 2022-02-09 PROCEDURE — 72125 CT NECK SPINE W/O DYE: CPT | Mod: 26,MG

## 2022-02-09 PROCEDURE — 72132 CT LUMBAR SPINE W/DYE: CPT | Mod: 26,MA

## 2022-02-09 PROCEDURE — 99285 EMERGENCY DEPT VISIT HI MDM: CPT

## 2022-02-09 PROCEDURE — 71045 X-RAY EXAM CHEST 1 VIEW: CPT

## 2022-02-09 PROCEDURE — 70496 CT ANGIOGRAPHY HEAD: CPT | Mod: MG

## 2022-02-09 PROCEDURE — 73030 X-RAY EXAM OF SHOULDER: CPT

## 2022-02-09 PROCEDURE — 72125 CT NECK SPINE W/O DYE: CPT | Mod: MG

## 2022-02-09 PROCEDURE — 74177 CT ABD & PELVIS W/CONTRAST: CPT | Mod: MA

## 2022-02-09 PROCEDURE — 70498 CT ANGIOGRAPHY NECK: CPT | Mod: MG

## 2022-02-09 PROCEDURE — 73060 X-RAY EXAM OF HUMERUS: CPT | Mod: 26,LT

## 2022-02-09 PROCEDURE — 80053 COMPREHEN METABOLIC PANEL: CPT

## 2022-02-09 PROCEDURE — 73060 X-RAY EXAM OF HUMERUS: CPT

## 2022-02-09 PROCEDURE — 71260 CT THORAX DX C+: CPT | Mod: MG

## 2022-02-09 PROCEDURE — 86901 BLOOD TYPING SEROLOGIC RH(D): CPT

## 2022-02-09 PROCEDURE — 73030 X-RAY EXAM OF SHOULDER: CPT | Mod: 26,LT

## 2022-02-09 RX ORDER — ACETAMINOPHEN 500 MG
975 TABLET ORAL ONCE
Refills: 0 | Status: COMPLETED | OUTPATIENT
Start: 2022-02-09 | End: 2022-02-09

## 2022-02-09 RX ORDER — IBUPROFEN 200 MG
600 TABLET ORAL ONCE
Refills: 0 | Status: COMPLETED | OUTPATIENT
Start: 2022-02-09 | End: 2022-02-09

## 2022-02-09 RX ORDER — OXYCODONE HYDROCHLORIDE 5 MG/1
5 TABLET ORAL ONCE
Refills: 0 | Status: DISCONTINUED | OUTPATIENT
Start: 2022-02-09 | End: 2022-02-09

## 2022-02-09 RX ADMIN — OXYCODONE HYDROCHLORIDE 5 MILLIGRAM(S): 5 TABLET ORAL at 19:08

## 2022-02-09 RX ADMIN — Medication 600 MILLIGRAM(S): at 20:14

## 2022-02-09 RX ADMIN — Medication 975 MILLIGRAM(S): at 16:34
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
## 2022-02-09 NOTE — ED ADULT NURSE NOTE - AGENT'S NAME
spouse - Keesha
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
## 2022-02-09 NOTE — ED PROVIDER NOTE - PATIENT PORTAL LINK FT
You can access the FollowMyHealth Patient Portal offered by [facility] by registering at the following website: [email or web address]. By joining [employer]’s FollowMyHealth portal, you will also be able to view your health information using other applications (apps) compatible with our system.

## 2022-02-09 NOTE — ED PROVIDER NOTE - CARE PROVIDER_API CALL
ARIANNA MOORE  Cardiology  [address] [place] [address]  Phone: [phone]  Fax: [phone]  Follow Up Time:
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
## 2022-04-06 RX ORDER — CHLORHEXIDINE GLUCONATE 213 G/1000ML
1 SOLUTION TOPICAL ONCE
Refills: 0 | Status: DISCONTINUED | OUTPATIENT
Start: 2022-04-07 | End: 2022-04-21

## 2022-04-06 NOTE — H&P PST ADULT - ASSESSMENT
56 y/o M with PMH HTN, DM, asthma, morbid obesity S/P gastric sleeve with recent CVA and     NPO confirmed  ASA 81 mg pre procedure  Pre and post cath hydration as per protocol  Consnet to be obtained by IC 58 y/o M with PMH HTN, DM, asthma, morbid obesity S/P gastric sleeve with recent CVA and     NPO confirmed  ASA 81 mg pre procedure  Pre and post cath hydration as per protocol  Consent to be obtained by IC

## 2022-04-06 NOTE — H&P PST ADULT - HISTORY OF PRESENT ILLNESS
Narrative:     Symptoms:        Angina (Class):        Ischemic Symptoms:     Heart Failure:        Systolic/Diastolic/Combined:        NYHA Class (within 2 weeks):     Assessment of LVEF (Must be within 6 months):       EF:        Assessed by:        Date:     Prior Cardiac Interventions (LHC, stents, CABG):       PCI's (Date, Stents, Vessels):        CABG (Date, Grafts):     Noninvasive Testing:   Stress Test: Date:        Protocol:        Duration of Exercise:        Symptoms:        EKG Changes:        DTS:        Myocardial Imaging:        Risk Assessment (Low, Medium, High):     Echo (Date, Findings):     Antianginal Therapies:        Beta Blockers:         Calcium Channel Blockers:        Long Acting Nitrates:        Ranexa:     Associated Risk Factors:        Cerebrovascular Disease: N/A       Chronic Lung Disease: N/A       Peripheral Arterial Disease: N/A       Chronic Kidney Disease (if yes, what is GFR): N/A       Uncontrolled Diabetes (if yes, what is HgbA1C or FBS): N/A       Poorly Controlled Hypertension (if yes, what is SBP): N/A       Morbid Obesity (if yes, what is BMI): N/A       History of Recent Ventricular Arrhythmia: N/A       Inability to Ambulate Safely: N/A       Need for Therapeutic Anticoagulation: N/A       Antiplatelet or Contrast Allergy: N/A Narrative: 56 y/o M with PMH HTN, HLD, morbid obesity S/P gastric sleave, CVA with recent hospitalization with c/o SOB/PAGE. He underwent NST which demonstrated small to medium sized area of mild ischemia in mid to apical ant walls. He presents for [facility] for further evaluation of his coronary anatomy.    Symptoms:        Angina (Class):        Ischemic Symptoms: PAGE    Heart Failure:        Systolic/Diastolic/Combined: NA       NYHA Class (within 2 weeks):     Assessment of LVEF (Must be within 6 months):       EF: 55%       Assessed by: NST       Date: 3/31/22    Prior Cardiac Interventions ([facility], stents, CABG): NA       PCI's (Date, Stents, Vessels):        CABG (Date, Grafts):     Noninvasive Testing:   Stress Test: Date: 3/31/22       Protocol: Regadenoson       Myocardial Imaging: small to medium sized area of mild ischemia in mid to apical ant walls       Risk Assessment (Low, Medium, High):     Echo (Date, Findings):     Risk Assessment  ASA  Mallampati  BRA  GFR    Antianginal Therapies:        Beta Blockers:         Calcium Channel Blockers:        Long Acting Nitrates:        Ranexa:     Associated Risk Factors:        Cerebrovascular Disease: N/A       Chronic Lung Disease: N/A       Peripheral Arterial Disease: N/A       Chronic Kidney Disease (if yes, what is GFR): N/A       Uncontrolled Diabetes (if yes, what is HgbA1C or FBS): N/A       Poorly Controlled Hypertension (if yes, what is SBP): N/A       Morbid Obesity (if yes, what is BMI): N/A       History of Recent Ventricular Arrhythmia: N/A       Inability to Ambulate Safely: N/A       Need for Therapeutic Anticoagulation: N/A       Antiplatelet or Contrast Allergy: N/A Narrative: 56 y/o M with PMH HTN, HLD, morbid obesity S/P gastric sleave, CVA with recent hospitalization with c/o SOB/PAGE. He underwent NST which demonstrated small to medium sized area of mild ischemia in mid to apical ant walls. He presents for [facility] for further evaluation of his coronary anatomy.    Symptoms:        Angina (Class): II       Ischemic Symptoms: PAGE    Heart Failure:        Systolic/Diastolic/Combined: NA       NYHA Class (within 2 weeks):     Assessment of LVEF (Must be within 6 months):       EF: 55%       Assessed by: NST       Date: 3/31/22    Prior Cardiac Interventions (C, stents, CABG): NA       PCI's (Date, Stents, Vessels):        CABG (Date, Grafts):     Noninvasive Testing:   Stress Test: Date: 3/31/22       Protocol: Regadenoson       Myocardial Imaging: small to medium sized area of mild ischemia in mid to apical ant walls       Risk Assessment (Low, Medium, High):     Echo (Date, Findings):     Risk Assessment  ASA  3  Mallampati  2  BRA  GFR    Antianginal Therapies:        Beta Blockers:         Calcium Channel Blockers:        Long Acting Nitrates:        Ranexa:     Associated Risk Factors:        Cerebrovascular Disease: N/A       Chronic Lung Disease: COPD       Peripheral Arterial Disease: N/A       Chronic Kidney Disease (if yes, what is GFR): N/A       Uncontrolled Diabetes (if yes, what is HgbA1C or FBS): N/A       Poorly Controlled Hypertension (if yes, what is SBP): N/A       Morbid Obesity (if yes, what is BMI): N/A       History of Recent Ventricular Arrhythmia: N/A       Inability to Ambulate Safely: N/A       Need for Therapeutic Anticoagulation: N/A       Antiplatelet or Contrast Allergy: N/A Narrative: 56 y/o M with PMH HTN, HLD, morbid obesity S/P gastric sleave, CVA with recent hospitalization with c/o SOB/PAGE. He underwent NST which demonstrated small to medium sized area of mild ischemia in mid to apical ant walls. He presents for [facility] for further evaluation of his coronary anatomy.    Symptoms:        Angina (Class): II       Ischemic Symptoms: PAGE    Heart Failure:        Systolic/Diastolic/Combined: NA       NYHA Class (within 2 weeks):     Assessment of LVEF (Must be within 6 months):       EF: 55%       Assessed by: NST       Date: 3/31/22    Prior Cardiac Interventions (C, stents, CABG): NA       PCI's (Date, Stents, Vessels):        CABG (Date, Grafts):     Noninvasive Testing:   Stress Test: Date: 3/31/22       Protocol: Regadenoson       Myocardial Imaging: small to medium sized area of mild ischemia in mid to apical ant walls       Risk Assessment (Low, Medium, High):     Echo (Date, Findings):     Risk Assessment  ASA  3  Mallampati  2  BRA  0.7  GFR  110    Antianginal Therapies:        Beta Blockers:         Calcium Channel Blockers:        Long Acting Nitrates:        Ranexa:     Associated Risk Factors:        Cerebrovascular Disease: N/A       Chronic Lung Disease: COPD       Peripheral Arterial Disease: N/A       Chronic Kidney Disease (if yes, what is GFR): N/A       Uncontrolled Diabetes (if yes, what is HgbA1C or FBS): N/A       Poorly Controlled Hypertension (if yes, what is SBP): N/A       Morbid Obesity (if yes, what is BMI): N/A       History of Recent Ventricular Arrhythmia: N/A       Inability to Ambulate Safely: N/A       Need for Therapeutic Anticoagulation: N/A       Antiplatelet or Contrast Allergy: N/A

## 2022-04-06 NOTE — H&P PST ADULT - FUNCTIONAL ASSESSMENT - BASIC MOBILITY PT AGE POP HIDDEN
Adult Implemented All Universal Safety Interventions:  [place] to call system. Call bell, personal items and telephone within reach. Instruct patient to call for assistance. Room bathroom lighting operational. Non-slip footwear when patient is off stretcher. Physically safe environment: no spills, clutter or unnecessary equipment. Stretcher in lowest position, wheels locked, appropriate side rails in place.

## 2022-04-07 ENCOUNTER — OUTPATIENT (OUTPATIENT)
Dept: OUTPATIENT SERVICES | Facility: HOSPITAL | Age: 57
LOS: 1 days | Discharge: HOME | End: 2022-04-07
Payer: COMMERCIAL

## 2022-04-07 ENCOUNTER — TRANSCRIPTION ENCOUNTER (OUTPATIENT)
Age: 57
End: 2022-04-07

## 2022-04-07 VITALS
HEART RATE: 55 BPM | DIASTOLIC BLOOD PRESSURE: 63 MMHG | WEIGHT: 165.35 LBS | HEIGHT: 63 IN | TEMPERATURE: 98 F | RESPIRATION RATE: 17 BRPM | SYSTOLIC BLOOD PRESSURE: 110 MMHG | OXYGEN SATURATION: 99 %

## 2022-04-07 VITALS — HEART RATE: 85 BPM | RESPIRATION RATE: 18 BRPM | SYSTOLIC BLOOD PRESSURE: 110 MMHG | DIASTOLIC BLOOD PRESSURE: 76 MMHG

## 2022-04-07 DIAGNOSIS — Z98.89 OTHER SPECIFIED POSTPROCEDURAL STATES: Chronic | ICD-10-CM

## 2022-04-07 DIAGNOSIS — R06.02 SHORTNESS OF BREATH: ICD-10-CM

## 2022-04-07 DIAGNOSIS — Z90.89 ACQUIRED ABSENCE OF OTHER ORGANS: Chronic | ICD-10-CM

## 2022-04-07 DIAGNOSIS — Z98.84 BARIATRIC SURGERY STATUS: Chronic | ICD-10-CM

## 2022-04-07 DIAGNOSIS — Z98.890 OTHER SPECIFIED POSTPROCEDURAL STATES: Chronic | ICD-10-CM

## 2022-04-07 DIAGNOSIS — Z90.49 ACQUIRED ABSENCE OF OTHER SPECIFIED PARTS OF DIGESTIVE TRACT: Chronic | ICD-10-CM

## 2022-04-07 LAB
ALBUMIN SERPL ELPH-MCNC: 3.7 G/DL — SIGNIFICANT CHANGE UP (ref 3.3–5.2)
ALP SERPL-CCNC: 93 U/L — SIGNIFICANT CHANGE UP (ref 40–120)
ALT FLD-CCNC: 23 U/L — SIGNIFICANT CHANGE UP
ANION GAP SERPL CALC-SCNC: 9 MMOL/L — SIGNIFICANT CHANGE UP (ref 5–17)
AST SERPL-CCNC: 25 U/L — SIGNIFICANT CHANGE UP
BASOPHILS # BLD AUTO: 0.03 K/UL — SIGNIFICANT CHANGE UP (ref 0–0.2)
BASOPHILS NFR BLD AUTO: 0.7 % — SIGNIFICANT CHANGE UP (ref 0–2)
BILIRUB SERPL-MCNC: 0.3 MG/DL — LOW (ref 0.4–2)
BUN SERPL-MCNC: 10.1 MG/DL — SIGNIFICANT CHANGE UP (ref 8–20)
CALCIUM SERPL-MCNC: 8.7 MG/DL — SIGNIFICANT CHANGE UP (ref 8.6–10.2)
CHLORIDE SERPL-SCNC: 101 MMOL/L — SIGNIFICANT CHANGE UP (ref 98–107)
CO2 SERPL-SCNC: 30 MMOL/L — HIGH (ref 22–29)
CREAT SERPL-MCNC: 0.63 MG/DL — SIGNIFICANT CHANGE UP (ref 0.5–1.3)
EGFR: 110 ML/MIN/1.73M2 — SIGNIFICANT CHANGE UP
EOSINOPHIL # BLD AUTO: 0.06 K/UL — SIGNIFICANT CHANGE UP (ref 0–0.5)
EOSINOPHIL NFR BLD AUTO: 1.4 % — SIGNIFICANT CHANGE UP (ref 0–6)
GLUCOSE BLDC GLUCOMTR-MCNC: 95 MG/DL — SIGNIFICANT CHANGE UP (ref 70–99)
GLUCOSE SERPL-MCNC: 103 MG/DL — HIGH (ref 70–99)
HCT VFR BLD CALC: 42.1 % — SIGNIFICANT CHANGE UP (ref 39–50)
HGB BLD-MCNC: 13.3 G/DL — SIGNIFICANT CHANGE UP (ref 13–17)
IMM GRANULOCYTES NFR BLD AUTO: 0.2 % — SIGNIFICANT CHANGE UP (ref 0–1.5)
LYMPHOCYTES # BLD AUTO: 1.85 K/UL — SIGNIFICANT CHANGE UP (ref 1–3.3)
LYMPHOCYTES # BLD AUTO: 42.2 % — SIGNIFICANT CHANGE UP (ref 13–44)
MAGNESIUM SERPL-MCNC: 2 MG/DL — SIGNIFICANT CHANGE UP (ref 1.6–2.6)
MCHC RBC-ENTMCNC: 25 PG — LOW (ref 27–34)
MCHC RBC-ENTMCNC: 31.6 GM/DL — LOW (ref 32–36)
MCV RBC AUTO: 79 FL — LOW (ref 80–100)
MONOCYTES # BLD AUTO: 0.4 K/UL — SIGNIFICANT CHANGE UP (ref 0–0.9)
MONOCYTES NFR BLD AUTO: 9.1 % — SIGNIFICANT CHANGE UP (ref 2–14)
NEUTROPHILS # BLD AUTO: 2.03 K/UL — SIGNIFICANT CHANGE UP (ref 1.8–7.4)
NEUTROPHILS NFR BLD AUTO: 46.4 % — SIGNIFICANT CHANGE UP (ref 43–77)
PLATELET # BLD AUTO: 172 K/UL — SIGNIFICANT CHANGE UP (ref 150–400)
POTASSIUM SERPL-MCNC: 4.1 MMOL/L — SIGNIFICANT CHANGE UP (ref 3.5–5.3)
POTASSIUM SERPL-SCNC: 4.1 MMOL/L — SIGNIFICANT CHANGE UP (ref 3.5–5.3)
PROT SERPL-MCNC: 6.8 G/DL — SIGNIFICANT CHANGE UP (ref 6.6–8.7)
RBC # BLD: 5.33 M/UL — SIGNIFICANT CHANGE UP (ref 4.2–5.8)
RBC # FLD: 14.5 % — SIGNIFICANT CHANGE UP (ref 10.3–14.5)
SODIUM SERPL-SCNC: 140 MMOL/L — SIGNIFICANT CHANGE UP (ref 135–145)
WBC # BLD: 4.38 K/UL — SIGNIFICANT CHANGE UP (ref 3.8–10.5)
WBC # FLD AUTO: 4.38 K/UL — SIGNIFICANT CHANGE UP (ref 3.8–10.5)

## 2022-04-07 PROCEDURE — 83735 ASSAY OF MAGNESIUM: CPT

## 2022-04-07 PROCEDURE — 93010 ELECTROCARDIOGRAM REPORT: CPT | Mod: 76

## 2022-04-07 PROCEDURE — 80053 COMPREHEN METABOLIC PANEL: CPT

## 2022-04-07 PROCEDURE — C1887: CPT

## 2022-04-07 PROCEDURE — C1769: CPT

## 2022-04-07 PROCEDURE — 99153 MOD SED SAME PHYS/QHP EA: CPT

## 2022-04-07 PROCEDURE — 82962 GLUCOSE BLOOD TEST: CPT

## 2022-04-07 PROCEDURE — 93458 L HRT ARTERY/VENTRICLE ANGIO: CPT

## 2022-04-07 PROCEDURE — 36415 COLL VENOUS BLD VENIPUNCTURE: CPT

## 2022-04-07 PROCEDURE — 85025 COMPLETE CBC W/AUTO DIFF WBC: CPT

## 2022-04-07 PROCEDURE — C1894: CPT

## 2022-04-07 PROCEDURE — 99152 MOD SED SAME PHYS/QHP 5/>YRS: CPT

## 2022-04-07 PROCEDURE — 93005 ELECTROCARDIOGRAM TRACING: CPT

## 2022-04-07 RX ORDER — METOPROLOL TARTRATE 50 MG
1 TABLET ORAL
Qty: 30 | Refills: 1
Start: 2022-04-07

## 2022-04-07 RX ORDER — METOPROLOL TARTRATE 50 MG
25 TABLET ORAL DAILY
Refills: 0 | Status: DISCONTINUED | OUTPATIENT
Start: 2022-04-07 | End: 2022-04-21

## 2022-04-07 RX ORDER — MELOXICAM 15 MG/1
1 TABLET ORAL
Qty: 0 | Refills: 0 | DISCHARGE

## 2022-04-07 RX ORDER — SODIUM CHLORIDE 9 MG/ML
250 INJECTION INTRAMUSCULAR; INTRAVENOUS; SUBCUTANEOUS ONCE
Refills: 0 | Status: COMPLETED | OUTPATIENT
Start: 2022-04-07 | End: 2022-04-07

## 2022-04-07 RX ORDER — ASPIRIN/CALCIUM CARB/MAGNESIUM 324 MG
81 TABLET ORAL ONCE
Refills: 0 | Status: COMPLETED | OUTPATIENT
Start: 2022-04-07 | End: 2022-04-07

## 2022-04-07 RX ORDER — RANITIDINE HYDROCHLORIDE 150 MG/1
1 TABLET, FILM COATED ORAL
Qty: 0 | Refills: 0 | DISCHARGE

## 2022-04-07 RX ADMIN — SODIUM CHLORIDE 250 MILLILITER(S): 9 INJECTION INTRAMUSCULAR; INTRAVENOUS; SUBCUTANEOUS at 11:02

## 2022-04-07 RX ADMIN — Medication 25 MILLIGRAM(S): at 11:48

## 2022-04-07 RX ADMIN — Medication 81 MILLIGRAM(S): at 08:32

## 2022-04-07 RX ADMIN — SODIUM CHLORIDE 250 MILLILITER(S): 9 INJECTION INTRAMUSCULAR; INTRAVENOUS; SUBCUTANEOUS at 08:30

## 2022-04-07 NOTE — CHART NOTE - NSCHARTNOTEFT_GEN_A_CORE
Post procedure EKG performed and reveals Afib with controlled ventricular response  Pt denies CP, palps or SOB  Has h/o CVA  Dr Doty made aware of findings and Toprol 25 mg po started  No DOAC at this time  Per Dr Doty pt can be dischanrged to home and follow up with him in the office tomorrow for further evaluation and management

## 2022-04-07 NOTE — DISCHARGE NOTE NURSING/CASE MANAGEMENT/SOCIAL WORK - NSDCPEFALRISK_GEN_ALL_CORE
For information on Fall & Injury Prevention, visit: [email or web address] OR  [email or web address] OR  https://www.cdc.gov/steadi/patient.html
For information on Fall & Injury Prevention, visit: [email or web address] OR  [email or web address] OR  https://www.cdc.gov/steadi/patient.html

## 2022-04-07 NOTE — DISCHARGE NOTE NURSING/CASE MANAGEMENT/SOCIAL WORK - PATIENT PORTAL LINK FT
You can access the FollowMyHealth Patient Portal offered by [facility] by registering at the following website: [email or web address]. By joining [employer]s FollowMyHealth portal, you will also be able to view your health information using other applications (apps) compatible with our system.

## 2022-04-07 NOTE — DISCHARGE NOTE PROVIDER - NSDCCPTREATMENT_GEN_ALL_CORE_FT
PRINCIPAL PROCEDURE  Procedure: Left heart catheterization  Findings and Treatment: mild CAD; no intervention  Restricted use with no heavy lifting of affected arm for 48 hours.  No submerging the arm in water for 48 hours.  You may start showering today.  Call your doctor for any bleeding, swelling, loss of sensation in the hand or fingers, or fingers turning blue.  If heavy bleeding or large lumps form, hold pressure at the spot and come to the Emergency Room.

## 2022-04-07 NOTE — DISCHARGE NOTE PROVIDER - NSDCMRMEDTOKEN_GEN_ALL_CORE_FT
aspirin 81 mg oral tablet: orally once a day   aspirin 81 mg oral tablet: orally once a day  metoprolol succinate 25 mg oral tablet, extended release: 1 tab(s) orally once a day

## 2022-04-07 NOTE — DISCHARGE NOTE PROVIDER - CARE PROVIDER_API CALL
Dakota Funk (MD)  Cardiovascular Disease  [address], [address]  [place] [address]  Phone: [phone]  Fax: [phone]  Follow Up Time: 2 weeks

## 2022-04-07 NOTE — DISCHARGE NOTE PROVIDER - HOSPITAL COURSE
· CAD s/p PCI in 2014   Continue ASA, BB and statin  56 y/o M with PMH HTN, HLD, morbid obesity S/P gastric sleave, CVA with recent hospitalization with c/o SOB/PAGE. He underwent NST which demonstrated small to medium sized area of mild ischemia in mid to apical ant walls. He presents for [facility] for further evaluation of his coronary anatomy.  s/p LHC via RRA with minimal coronary artery disease. Pt did experience cath induced VT which required defib x 1 of 200j. Procedure performed by Dr. Doty. Tolerated procedure well  No complaints of CP or SOB  Plan:  -Post procedure management/monitoring per protocol  -Access site precautions  -Radial compression band removal at 1200  -Bedrest x 2 hours post procedure  -Continue current medical therapy  -Consider statin therapy as outpt  -Educated regarding post procedure management and care  -Discussed the importance of RF modification  -F/U outpt in 1-2 weeks with Cardiologist Dr. Funk  -DISPO: Plan for D/C today if remains HDS and without complications  56 y/o M with PMH HTN, HLD, morbid obesity S/P gastric sleave, CVA with recent hospitalization with c/o SOB/PAGE. He underwent NST which demonstrated small to medium sized area of mild ischemia in mid to apical ant walls. He presents for [facility] for further evaluation of his coronary anatomy.  s/p LHC via RRA with minimal coronary artery disease. Pt did experience cath induced VT which required defib x 1 of 200j. Procedure performed by Dr. Doty. Tolerated procedure well  No complaints of CP or SOB  Noted to be in AFib in cath holding  EKG confirmed.  Dr Doty made aware and pt to be managed with BB and defer DOAC at this time    Plan:  -Post procedure management/monitoring per protocol  -Access site precautions  -Radial compression band removal at 1200  -Bedrest x 2 hours post procedure  -Continue current medical therapy  -Start Toprol 25 mg po daily  -Consider statin therapy as outpt  -Educated regarding post procedure management and care  -Discussed the importance of RF modification  -F/U outpt tomorrow with Dr Doty   -DISPO: Plan for D/C today if remains HDS and without complications

## 2022-04-07 NOTE — PROGRESS NOTE ADULT - SUBJECTIVE AND OBJECTIVE BOX
Now s/p LHC via RRA with minimal coronary artery disease. Pt did experience cath induced VT which required defib x 1 of 200j. Procedure performed by Dr. Doty. Pt recieved Heparin 3000 units and IV sedation intraprocedurally, arrived to recovery in NAD and HDS, *** access site stable, no bleed/hematoma, distal pulse +  Tolerated procedure well  No complaints of CP or SOB    VSS  Exam  Awake and alert  Lungs CTA  Heart normal S1, S2  RRA with hemoband in place without oozing or hematoma. Radial pulse 2+. R hand WWp without motor or sensory deficits     Plan:  -Formal cath report pending  -Post procedure management/monitoring per protocol  -Access site precautions  -Radial compression band removal at 1200  -Bedrest x 2 hours post procedure  -Continue current medical therapy  -Consider statin therapy as outpt  -Educated regarding post procedure management and care  -Discussed the importance of RF modification  -F/U outpt in 1-2 weeks with Cardiologist Dr. Funk  -DISPO: Plan for D/C today if remains HDS and without complications

## 2022-04-07 NOTE — DISCHARGE NOTE NURSING/CASE MANAGEMENT/SOCIAL WORK - PATIENT PORTAL LINK FT
You can access the FollowMyHealth Patient Portal offered by [facility] by registering at the following website: [email or web address]. By joining [employer]’s FollowMyHealth portal, you will also be able to view your health information using other applications (apps) compatible with our system.

## 2022-04-08 DIAGNOSIS — R07.89 OTHER CHEST PAIN: ICD-10-CM

## 2022-05-23 ENCOUNTER — APPOINTMENT (OUTPATIENT)
Dept: ELECTROPHYSIOLOGY | Facility: CLINIC | Age: 57
End: 2022-05-23

## 2023-01-18 ENCOUNTER — INPATIENT (INPATIENT)
Facility: HOSPITAL | Age: 58
LOS: 4 days | Discharge: HOME | End: 2023-01-23
Attending: HOSPITALIST | Admitting: HOSPITALIST
Payer: COMMERCIAL

## 2023-01-18 VITALS
DIASTOLIC BLOOD PRESSURE: 74 MMHG | TEMPERATURE: 99 F | SYSTOLIC BLOOD PRESSURE: 116 MMHG | OXYGEN SATURATION: 100 % | HEART RATE: 99 BPM | RESPIRATION RATE: 19 BRPM

## 2023-01-18 DIAGNOSIS — Z98.84 BARIATRIC SURGERY STATUS: Chronic | ICD-10-CM

## 2023-01-18 DIAGNOSIS — Z90.49 ACQUIRED ABSENCE OF OTHER SPECIFIED PARTS OF DIGESTIVE TRACT: Chronic | ICD-10-CM

## 2023-01-18 DIAGNOSIS — Z98.890 OTHER SPECIFIED POSTPROCEDURAL STATES: Chronic | ICD-10-CM

## 2023-01-18 DIAGNOSIS — Z98.89 OTHER SPECIFIED POSTPROCEDURAL STATES: Chronic | ICD-10-CM

## 2023-01-18 DIAGNOSIS — Z90.89 ACQUIRED ABSENCE OF OTHER ORGANS: Chronic | ICD-10-CM

## 2023-01-18 PROCEDURE — 99285 EMERGENCY DEPT VISIT HI MDM: CPT

## 2023-01-18 NOTE — ED ADULT TRIAGE NOTE - CHIEF COMPLAINT QUOTE
Pt c/o chest pain radiating to left side x1 day. PMHX HTN, T2DM, Asthma, SHARMIN. Pt c/o chest pain radiating to left side x1 day. PMHX HTN, T2DM, Asthma, SHARMIN. Denies SOB, cough, chills, n/v/d.

## 2023-01-19 DIAGNOSIS — Z29.9 ENCOUNTER FOR PROPHYLACTIC MEASURES, UNSPECIFIED: ICD-10-CM

## 2023-01-19 DIAGNOSIS — I20.0 UNSTABLE ANGINA: ICD-10-CM

## 2023-01-19 DIAGNOSIS — I48.20 CHRONIC ATRIAL FIBRILLATION, UNSPECIFIED: ICD-10-CM

## 2023-01-19 DIAGNOSIS — E11.9 TYPE 2 DIABETES MELLITUS WITHOUT COMPLICATIONS: ICD-10-CM

## 2023-01-19 DIAGNOSIS — J45.909 UNSPECIFIED ASTHMA, UNCOMPLICATED: ICD-10-CM

## 2023-01-19 DIAGNOSIS — R07.9 CHEST PAIN, UNSPECIFIED: ICD-10-CM

## 2023-01-19 LAB
ALBUMIN SERPL ELPH-MCNC: 3 G/DL — LOW (ref 3.3–5)
ALP SERPL-CCNC: 87 U/L — SIGNIFICANT CHANGE UP (ref 40–120)
ALT FLD-CCNC: 31 U/L — SIGNIFICANT CHANGE UP (ref 4–41)
ANION GAP SERPL CALC-SCNC: 9 MMOL/L — SIGNIFICANT CHANGE UP (ref 7–14)
ANISOCYTOSIS BLD QL: SLIGHT — SIGNIFICANT CHANGE UP
AST SERPL-CCNC: 30 U/L — SIGNIFICANT CHANGE UP (ref 4–40)
BASOPHILS # BLD AUTO: 0.05 K/UL — SIGNIFICANT CHANGE UP (ref 0–0.2)
BASOPHILS NFR BLD AUTO: 0.9 % — SIGNIFICANT CHANGE UP (ref 0–2)
BILIRUB SERPL-MCNC: 0.3 MG/DL — SIGNIFICANT CHANGE UP (ref 0.2–1.2)
BUN SERPL-MCNC: 12 MG/DL — SIGNIFICANT CHANGE UP (ref 7–23)
CALCIUM SERPL-MCNC: 8.5 MG/DL — SIGNIFICANT CHANGE UP (ref 8.4–10.5)
CHLORIDE SERPL-SCNC: 102 MMOL/L — SIGNIFICANT CHANGE UP (ref 98–107)
CO2 SERPL-SCNC: 25 MMOL/L — SIGNIFICANT CHANGE UP (ref 22–31)
CREAT SERPL-MCNC: 0.64 MG/DL — SIGNIFICANT CHANGE UP (ref 0.5–1.3)
EGFR: 109 ML/MIN/1.73M2 — SIGNIFICANT CHANGE UP
ELLIPTOCYTES BLD QL SMEAR: SLIGHT — SIGNIFICANT CHANGE UP
EOSINOPHIL # BLD AUTO: 0.11 K/UL — SIGNIFICANT CHANGE UP (ref 0–0.5)
EOSINOPHIL NFR BLD AUTO: 1.8 % — SIGNIFICANT CHANGE UP (ref 0–6)
FLUAV AG NPH QL: SIGNIFICANT CHANGE UP
FLUBV AG NPH QL: SIGNIFICANT CHANGE UP
GIANT PLATELETS BLD QL SMEAR: PRESENT — SIGNIFICANT CHANGE UP
GLUCOSE BLDC GLUCOMTR-MCNC: 209 MG/DL — HIGH (ref 70–99)
GLUCOSE SERPL-MCNC: 98 MG/DL — SIGNIFICANT CHANGE UP (ref 70–99)
HCT VFR BLD CALC: 33.8 % — LOW (ref 39–50)
HGB BLD-MCNC: 10 G/DL — LOW (ref 13–17)
IANC: 3.18 K/UL — SIGNIFICANT CHANGE UP (ref 1.8–7.4)
LYMPHOCYTES # BLD AUTO: 1.14 K/UL — SIGNIFICANT CHANGE UP (ref 1–3.3)
LYMPHOCYTES # BLD AUTO: 19.3 % — SIGNIFICANT CHANGE UP (ref 13–44)
MANUAL SMEAR VERIFICATION: SIGNIFICANT CHANGE UP
MCHC RBC-ENTMCNC: 20.6 PG — LOW (ref 27–34)
MCHC RBC-ENTMCNC: 29.6 GM/DL — LOW (ref 32–36)
MCV RBC AUTO: 69.7 FL — LOW (ref 80–100)
MICROCYTES BLD QL: SLIGHT — SIGNIFICANT CHANGE UP
MONOCYTES # BLD AUTO: 0.62 K/UL — SIGNIFICANT CHANGE UP (ref 0–0.9)
MONOCYTES NFR BLD AUTO: 10.5 % — SIGNIFICANT CHANGE UP (ref 2–14)
NEUTROPHILS # BLD AUTO: 3.62 K/UL — SIGNIFICANT CHANGE UP (ref 1.8–7.4)
NEUTROPHILS NFR BLD AUTO: 61.4 % — SIGNIFICANT CHANGE UP (ref 43–77)
OVALOCYTES BLD QL SMEAR: SLIGHT — SIGNIFICANT CHANGE UP
PLAT MORPH BLD: NORMAL — SIGNIFICANT CHANGE UP
PLATELET # BLD AUTO: 311 K/UL — SIGNIFICANT CHANGE UP (ref 150–400)
PLATELET COUNT - ESTIMATE: NORMAL — SIGNIFICANT CHANGE UP
POIKILOCYTOSIS BLD QL AUTO: SLIGHT — SIGNIFICANT CHANGE UP
POLYCHROMASIA BLD QL SMEAR: SLIGHT — SIGNIFICANT CHANGE UP
POTASSIUM SERPL-MCNC: 3.5 MMOL/L — SIGNIFICANT CHANGE UP (ref 3.5–5.3)
POTASSIUM SERPL-SCNC: 3.5 MMOL/L — SIGNIFICANT CHANGE UP (ref 3.5–5.3)
PROT SERPL-MCNC: 6.6 G/DL — SIGNIFICANT CHANGE UP (ref 6–8.3)
RBC # BLD: 4.85 M/UL — SIGNIFICANT CHANGE UP (ref 4.2–5.8)
RBC # FLD: 15.5 % — HIGH (ref 10.3–14.5)
RBC BLD AUTO: ABNORMAL
RSV RNA NPH QL NAA+NON-PROBE: SIGNIFICANT CHANGE UP
SARS-COV-2 RNA SPEC QL NAA+PROBE: SIGNIFICANT CHANGE UP
SODIUM SERPL-SCNC: 136 MMOL/L — SIGNIFICANT CHANGE UP (ref 135–145)
TROPONIN T, HIGH SENSITIVITY RESULT: <6 NG/L — SIGNIFICANT CHANGE UP
TROPONIN T, HIGH SENSITIVITY RESULT: <6 NG/L — SIGNIFICANT CHANGE UP
VARIANT LYMPHS # BLD: 6.1 % — HIGH (ref 0–6)
WBC # BLD: 5.9 K/UL — SIGNIFICANT CHANGE UP (ref 3.8–10.5)
WBC # FLD AUTO: 5.9 K/UL — SIGNIFICANT CHANGE UP (ref 3.8–10.5)

## 2023-01-19 PROCEDURE — 99236 HOSP IP/OBS SAME DATE HI 85: CPT

## 2023-01-19 PROCEDURE — 78452 HT MUSCLE IMAGE SPECT MULT: CPT | Mod: 26

## 2023-01-19 PROCEDURE — 93016 CV STRESS TEST SUPVJ ONLY: CPT | Mod: GC

## 2023-01-19 PROCEDURE — 71046 X-RAY EXAM CHEST 2 VIEWS: CPT | Mod: 26

## 2023-01-19 PROCEDURE — 99223 1ST HOSP IP/OBS HIGH 75: CPT

## 2023-01-19 PROCEDURE — 93018 CV STRESS TEST I&R ONLY: CPT | Mod: GC

## 2023-01-19 PROCEDURE — 93306 TTE W/DOPPLER COMPLETE: CPT | Mod: 26

## 2023-01-19 RX ORDER — IPRATROPIUM/ALBUTEROL SULFATE 18-103MCG
3 AEROSOL WITH ADAPTER (GRAM) INHALATION EVERY 6 HOURS
Refills: 0 | Status: DISCONTINUED | OUTPATIENT
Start: 2023-01-19 | End: 2023-01-23

## 2023-01-19 RX ORDER — TRAZODONE HCL 50 MG
50 TABLET ORAL DAILY
Refills: 0 | Status: DISCONTINUED | OUTPATIENT
Start: 2023-01-19 | End: 2023-01-23

## 2023-01-19 RX ORDER — ACETAMINOPHEN 500 MG
650 TABLET ORAL EVERY 6 HOURS
Refills: 0 | Status: DISCONTINUED | OUTPATIENT
Start: 2023-01-19 | End: 2023-01-23

## 2023-01-19 RX ORDER — PANTOPRAZOLE SODIUM 20 MG/1
1 TABLET, DELAYED RELEASE ORAL
Qty: 0 | Refills: 0 | DISCHARGE

## 2023-01-19 RX ORDER — PANTOPRAZOLE SODIUM 20 MG/1
40 TABLET, DELAYED RELEASE ORAL
Refills: 0 | Status: DISCONTINUED | OUTPATIENT
Start: 2023-01-19 | End: 2023-01-23

## 2023-01-19 RX ORDER — LANOLIN ALCOHOL/MO/W.PET/CERES
3 CREAM (GRAM) TOPICAL AT BEDTIME
Refills: 0 | Status: DISCONTINUED | OUTPATIENT
Start: 2023-01-19 | End: 2023-01-23

## 2023-01-19 RX ORDER — KETOROLAC TROMETHAMINE 30 MG/ML
30 SYRINGE (ML) INJECTION ONCE
Refills: 0 | Status: DISCONTINUED | OUTPATIENT
Start: 2023-01-19 | End: 2023-01-19

## 2023-01-19 RX ORDER — ATORVASTATIN CALCIUM 80 MG/1
80 TABLET, FILM COATED ORAL AT BEDTIME
Refills: 0 | Status: DISCONTINUED | OUTPATIENT
Start: 2023-01-19 | End: 2023-01-23

## 2023-01-19 RX ORDER — DEXTROSE 50 % IN WATER 50 %
12.5 SYRINGE (ML) INTRAVENOUS ONCE
Refills: 0 | Status: DISCONTINUED | OUTPATIENT
Start: 2023-01-19 | End: 2023-01-21

## 2023-01-19 RX ORDER — SUCRALFATE 1 G
1 TABLET ORAL
Qty: 0 | Refills: 0 | DISCHARGE

## 2023-01-19 RX ORDER — TRAZODONE HCL 50 MG
1 TABLET ORAL
Qty: 0 | Refills: 0 | DISCHARGE

## 2023-01-19 RX ORDER — SODIUM CHLORIDE 9 MG/ML
1000 INJECTION, SOLUTION INTRAVENOUS
Refills: 0 | Status: DISCONTINUED | OUTPATIENT
Start: 2023-01-19 | End: 2023-01-21

## 2023-01-19 RX ORDER — INSULIN LISPRO 100/ML
VIAL (ML) SUBCUTANEOUS
Refills: 0 | Status: DISCONTINUED | OUTPATIENT
Start: 2023-01-19 | End: 2023-01-23

## 2023-01-19 RX ORDER — SUCRALFATE 1 G
1 TABLET ORAL
Refills: 0 | Status: DISCONTINUED | OUTPATIENT
Start: 2023-01-19 | End: 2023-01-23

## 2023-01-19 RX ORDER — DEXTROSE 50 % IN WATER 50 %
15 SYRINGE (ML) INTRAVENOUS ONCE
Refills: 0 | Status: DISCONTINUED | OUTPATIENT
Start: 2023-01-19 | End: 2023-01-21

## 2023-01-19 RX ORDER — ASPIRIN/CALCIUM CARB/MAGNESIUM 324 MG
0 TABLET ORAL
Qty: 0 | Refills: 0 | DISCHARGE

## 2023-01-19 RX ORDER — RIVAROXABAN 15 MG-20MG
20 KIT ORAL DAILY
Refills: 0 | Status: DISCONTINUED | OUTPATIENT
Start: 2023-01-19 | End: 2023-01-19

## 2023-01-19 RX ORDER — GLUCAGON INJECTION, SOLUTION 0.5 MG/.1ML
1 INJECTION, SOLUTION SUBCUTANEOUS ONCE
Refills: 0 | Status: DISCONTINUED | OUTPATIENT
Start: 2023-01-19 | End: 2023-01-21

## 2023-01-19 RX ORDER — DEXTROSE 50 % IN WATER 50 %
25 SYRINGE (ML) INTRAVENOUS ONCE
Refills: 0 | Status: DISCONTINUED | OUTPATIENT
Start: 2023-01-19 | End: 2023-01-21

## 2023-01-19 RX ORDER — INSULIN LISPRO 100/ML
VIAL (ML) SUBCUTANEOUS AT BEDTIME
Refills: 0 | Status: DISCONTINUED | OUTPATIENT
Start: 2023-01-19 | End: 2023-01-23

## 2023-01-19 RX ORDER — ASPIRIN/CALCIUM CARB/MAGNESIUM 324 MG
81 TABLET ORAL DAILY
Refills: 0 | Status: DISCONTINUED | OUTPATIENT
Start: 2023-01-19 | End: 2023-01-23

## 2023-01-19 RX ORDER — GABAPENTIN 400 MG/1
200 CAPSULE ORAL
Refills: 0 | Status: DISCONTINUED | OUTPATIENT
Start: 2023-01-19 | End: 2023-01-23

## 2023-01-19 RX ORDER — HEPARIN SODIUM 5000 [USP'U]/ML
5000 INJECTION INTRAVENOUS; SUBCUTANEOUS EVERY 12 HOURS
Refills: 0 | Status: DISCONTINUED | OUTPATIENT
Start: 2023-01-20 | End: 2023-01-20

## 2023-01-19 RX ADMIN — RIVAROXABAN 20 MILLIGRAM(S): KIT at 12:59

## 2023-01-19 RX ADMIN — GABAPENTIN 200 MILLIGRAM(S): 400 CAPSULE ORAL at 17:49

## 2023-01-19 RX ADMIN — Medication 30 MILLIGRAM(S): at 03:31

## 2023-01-19 RX ADMIN — Medication 1 GRAM(S): at 22:30

## 2023-01-19 RX ADMIN — Medication 1 GRAM(S): at 17:48

## 2023-01-19 RX ADMIN — Medication 1 GRAM(S): at 12:59

## 2023-01-19 RX ADMIN — ATORVASTATIN CALCIUM 80 MILLIGRAM(S): 80 TABLET, FILM COATED ORAL at 21:48

## 2023-01-19 RX ADMIN — PANTOPRAZOLE SODIUM 40 MILLIGRAM(S): 20 TABLET, DELAYED RELEASE ORAL at 17:49

## 2023-01-19 NOTE — ED ADULT NURSE NOTE - OBJECTIVE STATEMENT
Pt received AxOx4 ambulatory self at baseline presenting with chest pain since 5PM last night. States the pain is midsternal, not relived with position changes or medication. Denies cardiac history. Respirations are evn and unlabored. no acute distress noted at this time. Pt also explains he had 2 episodes of bright red blood coming from rectum while in the shower. Denies blood in stool or urine. R20G placed labs drawn and sent.

## 2023-01-19 NOTE — ED CDU PROVIDER INITIAL DAY NOTE - PROGRESS NOTE DETAILS
Stress test resulted abnormal, discussed with tele doc, recommending admission for cath. Discussed with pt and he is aware of plan for admission. Stress test resulted abnormal, discussed with tele doc, recommending admission for cath. Discussed with pt and he is aware of plan for admission. Pt now states he had chest pressure with nausea and vomiting 2 days ago prior to episode of chest pain at work yesterday.

## 2023-01-19 NOTE — CONSULT NOTE ADULT - SUBJECTIVE AND OBJECTIVE BOX
HISTORY OF PRESENT ILLNESS: HPI:  57 year old male with PMH HTN, no obs CAD by Cath 4/2022, Asthma, gastric sleeve in 2015 and GERD presented to ER with complaints of chest pain x 1 day.  Pain is reproducible on exam, worse with movement of the chest and on exertion.  Denies any associate sx such as SOB, diaphoresis, N/V, palps or syncope.  Denies radiation of pain.  Denies heavy lifting.        PAST MEDICAL & SURGICAL HISTORY:  H/O: Obesity  s/p gastric sleeve in 2015, lost ~75 lbs    DM (diabetes mellitus)  resolved with weight loss surgery    Hypertension  on diet control    Asthma  intubated x 2 in 1992, hospitalized in 2011, currently stable, last used rescue inhaler 7 months ago    SHARMIN (obstructive sleep apnea)  resolved after weight loss surgery    Pilonidal cyst with abscess  had surgery    Lower back pain    GERD (gastroesophageal reflux disease)    S/P tonsillectomy    S/P appendectomy    S/P hernia repair  bilateral inguinal hernia repair    S/P sinus surgery  with septoplasty      MEDICATIONS  (STANDING):  gabapentin 200 milliGRAM(s) Oral two times a day  pantoprazole    Tablet 40 milliGRAM(s) Oral two times a day  rivaroxaban 20 milliGRAM(s) Oral daily  sucralfate 1 Gram(s) Oral four times a day      Allergies  Avelox (Unknown)      FAMILY HISTORY:  Family history of diabetes mellitus (DM)  mother - also CVA    Family history of cerebrovascular accident (CVA) in father    FH: leukemia  brother    Noncontributory for premature coronary disease or sudden cardiac death    SOCIAL HISTORY:    [x ] Non-smoker  [ ] Smoker  [ ] Alcohol    FLU VACCINE THIS YEAR STARTS IN AUGUST:  [ ] Yes    [ ] No    IF OVER 65 HAVE YOU EVER HAD A PNA VACCINE:  [ ] Yes    [ ] No       [ ] N/A      REVIEW OF SYSTEMS:  [x ]chest pain  [  ]shortness of breath  [  ]palpitations  [  ]syncope  [ ]near syncope [ ]upper extremity weakness   [ ] lower extremity weakness  [  ]diplopia  [  ]altered mental status   [  ]fevers  [ ]chills [ ]nausea  [ ]vomiting  [  ]dysphagia    [ ]abdominal pain  [ ]melena  [ ]BRBPR    [  ]epistaxis  [  ]rash    [ ]lower extremity edema        [ x] All others negative	  [ ] Unable to obtain      LABS:	 	    CARDIAC MARKERS:  TROP T <6, <6                          10.0   5.90  )-----------( 311      ( 19 Jan 2023 03:29 )             33.8       136  |  102  |  12  ----------------------------<  98  3.5   |  25  |  0.64    Ca    8.5      19 Jan 2023 03:29    TPro  6.6  /  Alb  3.0<L>  /  TBili  0.3  /  DBili  x   /  AST  30  /  ALT  31  /  AlkPhos  87  01-19    PHYSICAL EXAM:  T(C): 36.7 (01-19-23 @ 09:38), Max: 37 (01-18-23 @ 22:55)  HR: 63 (01-19-23 @ 09:38) (61 - 99)  BP: 107/69 (01-19-23 @ 09:38) (92/51 - 116/74)  RR: 16 (01-19-23 @ 09:38) (16 - 19)  SpO2: 100% (01-19-23 @ 09:38) (99% - 100%)    Gen: Appears well in NAD  HEENT:  (-)icterus (-)pallor  CV: N S1 S2 1/6 AUDI (+)2 Pulses B/l  Resp:  Clear to auscultation B/L, normal effort  GI: (+) BS Soft, NT, ND  Lymph:  (-)Edema, (-)obvious lymphadenopathy  Skin: Warm to touch, Normal turgor  Psych: Appropriate mood and affect	      ECG:  	NSR    < from: Transthoracic Echocardiogram (01.19.23 @ 07:48) >  CONCLUSIONS:  1. Normal mitral valve. Mild mitral regurgitation.  2. Normal left ventricular internal dimensions and wall  thicknesses.  3. Normal left ventricular systolic function. No segmental  wall motion abnormalities.  4. Normal left ventricular diastolic function.  5. Normal right ventricular size and function.  ------------------------------------------------------------------------  Confirmed on  1/19/2023 - 08:55:08 by Jorge Kim M.D.,  [facility], FASE    < end of copied text >    < from: Cardiac Catheterization (04.07.22 @ 09:48) >  VENTRICLES: No left ventriculogram was performed.  CORONARY VESSELS: The coronary circulation is right dominant.  LM:   --  LM: Normal.  LAD:   --  LAD: Angiography showed minor luminal irregularities with no  flow limiting lesions.  CX:   --  Circumflex: Angiography showed minor luminal irregularities with  no flow limiting lesions.  RCA:   --  RCA: Angiography showed no evidence of disease.  COMPLICATIONS: Ventricular tachycardia occurred during the cath lab visit.  This complication required cardioversion.  DIAGNOSTIC IMPRESSIONS: There is mild irregularity of the coronary anatomy.  DIAGNOSTIC RECOMMENDATIONS: Medical management is recommended.  Prepared and signed by  Vinny Doty MD  Signed 04/07/2022 10:41:32    < end of copied text >      ASSESSMENT/PLAN: 57 year old male with PMH HTN, no obs CAD by Cath 4/2022, Asthma, gastric sleeve in 2015 and GERD presented to ER with complaints of chest pain x 1 day.  Pain is reproducible on exam, worse with movement of the chest and on exertion    1. Chest Pain  --pain is atypical for anginal pain  --it is reproducible and worse with change in position or movement  --ACS ruled out  --LHC <1 year ago without any CAD  --TTE with structurally normal heart  --awaiting NST results  --if NST without ischemia, no further invasive cardiac work up planned and he can f/u with his OP Cardiologist, Dr Dakota Funk after DC  --Xarelto 20mg started by ER, unclear indication, will d/w ER    2. DM  --per chart, resolved after weight loss, however A1c is 7, consider endo eval    3. HTN  --cont home Metoprolol                   HISTORY OF PRESENT ILLNESS: HPI:  57 year old male with PMH HTN, no obs CAD by Cath 4/2022, PAF on Xarelto, Asthma, gastric sleeve in 2015 and GERD presented to ER with complaints of chest pain x 1 day.  Pain is reproducible on exam, worse with movement of the chest and on exertion.  Denies any associate sx such as SOB, diaphoresis, N/V, palps or syncope.  Denies radiation of pain.  Denies heavy lifting.        PAST MEDICAL & SURGICAL HISTORY:  H/O: Obesity  s/p gastric sleeve in 2015, lost ~75 lbs    DM (diabetes mellitus)  resolved with weight loss surgery    Hypertension  on diet control    Asthma  intubated x 2 in 1992, hospitalized in 2011, currently stable, last used rescue inhaler 7 months ago    SHARMIN (obstructive sleep apnea)  resolved after weight loss surgery    Pilonidal cyst with abscess  had surgery    Lower back pain    GERD (gastroesophageal reflux disease)    S/P tonsillectomy    S/P appendectomy    S/P hernia repair  bilateral inguinal hernia repair    S/P sinus surgery  with septoplasty      MEDICATIONS  (STANDING):  gabapentin 200 milliGRAM(s) Oral two times a day  pantoprazole    Tablet 40 milliGRAM(s) Oral two times a day  rivaroxaban 20 milliGRAM(s) Oral daily  sucralfate 1 Gram(s) Oral four times a day      Allergies  Avelox (Unknown)      FAMILY HISTORY:  Family history of diabetes mellitus (DM)  mother - also CVA    Family history of cerebrovascular accident (CVA) in father    FH: leukemia  brother    Noncontributory for premature coronary disease or sudden cardiac death    SOCIAL HISTORY:    [x ] Non-smoker  [ ] Smoker  [ ] Alcohol    FLU VACCINE THIS YEAR STARTS IN AUGUST:  [ ] Yes    [ ] No    IF OVER 65 HAVE YOU EVER HAD A PNA VACCINE:  [ ] Yes    [ ] No       [ ] N/A      REVIEW OF SYSTEMS:  [x ]chest pain  [  ]shortness of breath  [  ]palpitations  [  ]syncope  [ ]near syncope [ ]upper extremity weakness   [ ] lower extremity weakness  [  ]diplopia  [  ]altered mental status   [  ]fevers  [ ]chills [ ]nausea  [ ]vomiting  [  ]dysphagia    [ ]abdominal pain  [ ]melena  [ ]BRBPR    [  ]epistaxis  [  ]rash    [ ]lower extremity edema        [ x] All others negative	  [ ] Unable to obtain      LABS:	 	    CARDIAC MARKERS:  TROP T <6, <6                          10.0   5.90  )-----------( 311      ( 19 Jan 2023 03:29 )             33.8       136  |  102  |  12  ----------------------------<  98  3.5   |  25  |  0.64    Ca    8.5      19 Jan 2023 03:29    TPro  6.6  /  Alb  3.0<L>  /  TBili  0.3  /  DBili  x   /  AST  30  /  ALT  31  /  AlkPhos  87  01-19    PHYSICAL EXAM:  T(C): 36.7 (01-19-23 @ 09:38), Max: 37 (01-18-23 @ 22:55)  HR: 63 (01-19-23 @ 09:38) (61 - 99)  BP: 107/69 (01-19-23 @ 09:38) (92/51 - 116/74)  RR: 16 (01-19-23 @ 09:38) (16 - 19)  SpO2: 100% (01-19-23 @ 09:38) (99% - 100%)    Gen: Appears well in NAD  HEENT:  (-)icterus (-)pallor  CV: N S1 S2 1/6 AUDI (+)2 Pulses B/l  Resp:  Clear to auscultation B/L, normal effort  GI: (+) BS Soft, NT, ND  Lymph:  (-)Edema, (-)obvious lymphadenopathy  Skin: Warm to touch, Normal turgor  Psych: Appropriate mood and affect	      ECG:  	NSR    < from: Transthoracic Echocardiogram (01.19.23 @ 07:48) >  CONCLUSIONS:  1. Normal mitral valve. Mild mitral regurgitation.  2. Normal left ventricular internal dimensions and wall  thicknesses.  3. Normal left ventricular systolic function. No segmental  wall motion abnormalities.  4. Normal left ventricular diastolic function.  5. Normal right ventricular size and function.  ------------------------------------------------------------------------  Confirmed on  1/19/2023 - 08:55:08 by Jorge [place], M.D.,  [facility], FASE    < end of copied text >    < from: Cardiac Catheterization (04.07.22 @ 09:48) >  VENTRICLES: No left ventriculogram was performed.  CORONARY VESSELS: The coronary circulation is right dominant.  LM:   --  LM: Normal.  LAD:   --  LAD: Angiography showed minor luminal irregularities with no  flow limiting lesions.  CX:   --  Circumflex: Angiography showed minor luminal irregularities with  no flow limiting lesions.  RCA:   --  RCA: Angiography showed no evidence of disease.  COMPLICATIONS: Ventricular tachycardia occurred during the cath lab visit.  This complication required cardioversion.  DIAGNOSTIC IMPRESSIONS: There is mild irregularity of the coronary anatomy.  DIAGNOSTIC RECOMMENDATIONS: Medical management is recommended.  Prepared and signed by  Vinny Doty MD  Signed 04/07/2022 10:41:32    < end of copied text >      ASSESSMENT/PLAN: 57 year old male with PMH HTN, no obs CAD by Cath 4/2022, PAF on XArelto 4/2022, Asthma, gastric sleeve in 2015 and GERD presented to ER with complaints of chest pain x 1 day.  Pain is reproducible on exam, worse with movement of the chest and on exertion    1. Chest Pain  --pain is atypical for anginal pain  --it is reproducible and worse with change in position or movement  --ACS ruled out  --LHC <1 year ago without any CAD  --TTE with structurally normal heart  --awaiting NST results  --if NST without ischemia, no further invasive cardiac work up planned and he can f/u with his OP Cardiologist, Dr Dakota Funk after DC    2. DM  --per chart, resolved after weight loss, however A1c is 7, consider endo eval    3. HTN  --cont home Metoprolol    4. hx PAF  --per chart, PAF post cath in 4/2022, cont Xarelto

## 2023-01-19 NOTE — H&P ADULT - PROBLEM SELECTOR PLAN 3
- monitor for now, no respiratory distress - patient had a hx of afib in the past  - NSR on EKG  - hold xarelto for now pending cath in the AM

## 2023-01-19 NOTE — ED CDU PROVIDER DISPOSITION NOTE - ATTENDING CONTRIBUTION TO CARE
57 year old male,past medical history of diabetes and hypertension, stroke, asthma s/p cardiac cath 4/22 with mild Cath 4/22 with mild CAD, A-fib on xarelto for evaluation of left sided chest painfor one day. EKG withnonischemic, troope negative times two period CDU for stress test and echo. Abnormal stress test discussed with cardiology who recommends admission.

## 2023-01-19 NOTE — H&P ADULT - HISTORY OF PRESENT ILLNESS
This is a 56 y/o M with pmhx of DM, CVA, Asthma, presented to the ED for new onset chest pain. The patient had been getting chest pain that is on and off with no particular trigger and pattern. Yesterday the patient was working about had sudden onset of chest pain with episode of diaphoresis and "feeling warm". The patient also endorsed that chest pain wound worsen with exertion and walking. The patient reports still having chest pain but it is "not as bad" as before. Denies shortness of breath. He had an angiogram done last year which he said was normal. No family hx of cardiac disease or prior hx of MI. Reported hx of DM but is not on any medications. He also gets occasional palpitations, no LOC.    The patient was in the CDU and was found to have a positive stress test showing inferior wall motion abnormalities. Admit for angiogram.

## 2023-01-19 NOTE — ED CDU PROVIDER DISPOSITION NOTE - CLINICAL COURSE
57yM, nonsmoker with PMH of DM (not on med), HTN, CVA, asthma, s/p cardiac cath 4/22 with mild CAD, afib? on Xarelto presents to ED c/o left side chest pain x1 day while at work, night prior had chest pressure and vomiting. In main ED EKG unchanged, non ischemic, trop negative x 2, sent to CDU for stress test and echo, tele doc evaluation. Stress test resulted abnormal, discussed with tele doc, recommending admission for cath. Discussed with pt and he is aware of plan for admission. Pt now states he had chest pressure with nausea and vomiting 2 days ago prior to episode of chest pain at work yesterday.

## 2023-01-19 NOTE — H&P ADULT - PROBLEM SELECTOR PLAN 1
Assessment:  - patient presented for chest pain  - troponins neg x2 and EKG is wnl  - Nuclear stress test concerning for a small, mild defect in basal inferolateral wall that isreversible, suggestive of ischemia    Plan:  - tele monitoring  - will send a repeat troponin, CK/CKMB, and EKG now  - spoken with Dr. Manzano's PA - recommend just giving ASA for now. Cath in the AM  - Echo is wnl, no LV dysfunction  - high dose statins

## 2023-01-19 NOTE — ED CDU PROVIDER INITIAL DAY NOTE - OBJECTIVE STATEMENT
56 yo M, nonsmoker with PMH of DM (not on med), HTN, CVA, asthma, h/o cardiac cath 4 yrs ago, presents to ED c/o left side chest pain x1 day while at work. Reports pulling pain, 7/10, intermittent, radiates to left arm.  Reports pain is worse with position, movement, deep breathing and palpation. Pt denies any associated symptoms, including f/c/n/v, near syncope, palpitation, diaphoresis, sob, weakness, any recent trauma or injury to chest, lower extremity edema. Denies any prior history of DVT/PE, hx of malignancy or known hypercoagulable state. Denies any early family history of cardiac death or MI. 56 yo M, nonsmoker with PMH of DM (not on med), HTN, CVA, asthma, s/p cardiac cath 4/22 with mild CAD, presents to ED c/o left side chest pain x1 day while at work. Reports pulling pain, 7/10, intermittent, radiates to left arm.  Reports pain is worse with position, movement, deep breathing and palpation. Pt denies any associated symptoms, including f/c/n/v, near syncope, palpitation, diaphoresis, sob, weakness, any recent trauma or injury to chest, lower extremity edema. Denies any prior history of DVT/PE, hx of malignancy or known hypercoagulable state. Denies any early family history of cardiac death or MI.

## 2023-01-19 NOTE — ED CDU PROVIDER INITIAL DAY NOTE - ATTENDING APP SHARED VISIT CONTRIBUTION OF CARE
I have made the decision to admit this patient to the CDU as documented in my Provider note I have reviewed the note  written by Sean STERLING, on that visit day. I have supervised and participated as necessary in the performance of procedures indicated for patient management and was available at all phases of the patient´s visit when needed.    Please see the provider note for the details of the decision to admit  Vital Signs Stable  PE unchanged at time of admission   Patient admitted to CDU for stress, echo, telemetry, tele doc evaluation and observation after PTED c/o 1 day of LSCP as described; In ED, EKG NSR, trop <6 and CXR clear  Will continue to monitor and follow results/reccs

## 2023-01-19 NOTE — ED ADULT NURSE REASSESSMENT NOTE - NS ED NURSE REASSESS COMMENT FT1
Pt received from stress test. Resting comfortably in stretcher. Breathing even and unlabored. Endorsing mild CP with movement. VS as charted. Tolerating breakfast tray. Awaiting CDU bed.

## 2023-01-19 NOTE — H&P ADULT - TIME BILLING
I have spent a total of greater than 76 minutes time spent to prepare to see the patient, including the Emergency room charts and ED progress notes, obtaining and reviewing history, physical examination, explaining the diagnosis, prognosis and treatment plan with the patient/family/caregiver. I also have spent the time ordering studies and testing, interpreting results, medicine reconciliation, subspecialty consultations as indicated and documentation as above.

## 2023-01-19 NOTE — ED CDU PROVIDER INITIAL DAY NOTE - CLINICAL SUMMARY MEDICAL DECISION MAKING FREE TEXT BOX
58 yo M, nonsmoker with PMH of DM (not on med), HTN, CVA, asthma, h/o cardiac cath 4 yrs ago, presents to ED c/o left side chest pain x1 day while at work. In ED, EKG NSR, trop <6, CXR clear. Pt sent to CDU for stress, echo, telemetry, tele doc evaluation and observation. 56 yo M, nonsmoker with PMH of DM (not on med), HTN, CVA, asthma, s/p cardiac cath 4/22 with mild CAD, presents to ED c/o left side chest pain x1 day while at work. In ED, EKG NSR, trop <6, CXR clear. Pt sent to CDU for stress, echo, telemetry, tele doc evaluation and observation.

## 2023-01-19 NOTE — H&P ADULT - ASSESSMENT
58 y/o M with pmhx of DM, CVA, Asthma, presented to the ED for new onset chest pain. Patient found to have an abnormal stress test concerning for CAD. Admit for CAD and ACS work up.

## 2023-01-19 NOTE — H&P ADULT - NSHPLABSRESULTS_GEN_ALL_CORE
10.0   5.90  )-----------( 311      ( 19 Jan 2023 03:29 )             33.8       01-19    136  |  102  |  12  ----------------------------<  98  3.5   |  25  |  0.64    Ca    8.5      19 Jan 2023 03:29    TPro  6.6  /  Alb  3.0<L>  /  TBili  0.3  /  DBili  x   /  AST  30  /  ALT  31  /  AlkPhos  87  01-19                            CXR: As per my read - clear lungs no opacities, Will wait for prelim/official read    EKG: As per my read - NSR no ST changes

## 2023-01-19 NOTE — ED CDU PROVIDER INITIAL DAY NOTE - NS ED ATTENDING STATEMENT MOD
This was a shared visit with the KIERSTEN. I reviewed and verified the documentation and independently performed the documented:

## 2023-01-19 NOTE — ED PROVIDER NOTE - CLINICAL SUMMARY MEDICAL DECISION MAKING FREE TEXT BOX
57-year-old male history of hypertension diabetes presents with left-sided chest pain x1 day.    plan  - labs  - cxr  - ekg  - trop  - likely cdu for stress

## 2023-01-19 NOTE — ED ADULT NURSE NOTE - CHIEF COMPLAINT QUOTE
Pt c/o chest pain radiating to left side x1 day. PMHX HTN, T2DM, Asthma, SHARMIN. Denies SOB, cough, chills, n/v/d.

## 2023-01-19 NOTE — CONSULT NOTE ADULT - NS ATTEND AMEND GEN_ALL_CORE FT
Patient seen and examined. Agree with plan as detailed in PA/NP Note.     Pain is reproducible, likely MSK, negative cath last year, current echo without wall motion abnormalities. F/u NST      Yarelis Manzano MD  Pager: [phone]

## 2023-01-19 NOTE — H&P ADULT - NSHPPHYSICALEXAM_GEN_ALL_CORE
PHYSICAL EXAM:  VITALS: Vital Signs Last 24 Hrs  T(C): 36.8 (19 Jan 2023 21:27), Max: 37.1 (19 Jan 2023 18:00)  T(F): 98.2 (19 Jan 2023 21:27), Max: 98.8 (19 Jan 2023 18:00)  HR: 95 (19 Jan 2023 21:27) (61 - 95)  BP: 113/71 (19 Jan 2023 21:27) (92/51 - 113/71)  BP(mean): --  RR: 19 (19 Jan 2023 21:27) (16 - 19)  SpO2: 99% (19 Jan 2023 21:27) (97% - 100%)    Parameters below as of 19 Jan 2023 21:27  Patient On (Oxygen Delivery Method): room air      GENERAL: NAD, well-developed, well-nourished  HEAD:  Atraumatic, Normocephalic  EYES: EOMI, PERRL, conjunctiva and sclera clear  ENT: Moist Mucus Membranes present, no ulcers appreciated  NECK: Supple, No JVD  CHEST/LUNG: Clear to auscultation bilaterally; No wheezes, rales or rhonchi, no accessory muscle use  HEART: Regular rate and rhythm; No murmurs, rubs, or gallops, (+)S1, S2  ABDOMEN: Soft, Nontender, Nondistended; Normal Bowel sounds   EXTREMITIES:  2+ Peripheral Pulses, No clubbing, cyanosis, or edema  PSYCH: normal mood and affect  NEUROLOGY: AAOx3, non-focal  SKIN: No rashes or lesions

## 2023-01-19 NOTE — ED PROVIDER NOTE - OBJECTIVE STATEMENT
57-year-old male history of hypertension diabetes presents with left-sided chest pain x1 day.  Patient states pain started while at work.  Pain is sharp worse with certain movements positions.  Patient also states pain worse with exertion.  Patient denies shortness of breath patient denies abdominal pain nausea vomiting diarrhea. patient denies family history of cardiovascular disease.  Patient has never had cardiac work-up in past.

## 2023-01-20 LAB
ALBUMIN SERPL ELPH-MCNC: 3 G/DL — LOW (ref 3.3–5)
ALP SERPL-CCNC: 86 U/L — SIGNIFICANT CHANGE UP (ref 40–120)
ALT FLD-CCNC: 28 U/L — SIGNIFICANT CHANGE UP (ref 4–41)
ANION GAP SERPL CALC-SCNC: 9 MMOL/L — SIGNIFICANT CHANGE UP (ref 7–14)
APTT BLD: 33.7 SEC — SIGNIFICANT CHANGE UP (ref 27–36.3)
AST SERPL-CCNC: 28 U/L — SIGNIFICANT CHANGE UP (ref 4–40)
BASOPHILS # BLD AUTO: 0.04 K/UL — SIGNIFICANT CHANGE UP (ref 0–0.2)
BASOPHILS NFR BLD AUTO: 0.9 % — SIGNIFICANT CHANGE UP (ref 0–2)
BILIRUB SERPL-MCNC: 0.4 MG/DL — SIGNIFICANT CHANGE UP (ref 0.2–1.2)
BUN SERPL-MCNC: 11 MG/DL — SIGNIFICANT CHANGE UP (ref 7–23)
CALCIUM SERPL-MCNC: 8.4 MG/DL — SIGNIFICANT CHANGE UP (ref 8.4–10.5)
CHLORIDE SERPL-SCNC: 102 MMOL/L — SIGNIFICANT CHANGE UP (ref 98–107)
CK MB BLD-MCNC: <1.7 % — SIGNIFICANT CHANGE UP (ref 0–2.5)
CK MB CFR SERPL CALC: <1 NG/ML — SIGNIFICANT CHANGE UP
CK SERPL-CCNC: 60 U/L — SIGNIFICANT CHANGE UP (ref 30–200)
CO2 SERPL-SCNC: 26 MMOL/L — SIGNIFICANT CHANGE UP (ref 22–31)
CREAT SERPL-MCNC: 0.59 MG/DL — SIGNIFICANT CHANGE UP (ref 0.5–1.3)
EGFR: 112 ML/MIN/1.73M2 — SIGNIFICANT CHANGE UP
EOSINOPHIL # BLD AUTO: 0.11 K/UL — SIGNIFICANT CHANGE UP (ref 0–0.5)
EOSINOPHIL NFR BLD AUTO: 2.4 % — SIGNIFICANT CHANGE UP (ref 0–6)
GLUCOSE BLDC GLUCOMTR-MCNC: 103 MG/DL — HIGH (ref 70–99)
GLUCOSE BLDC GLUCOMTR-MCNC: 167 MG/DL — HIGH (ref 70–99)
GLUCOSE BLDC GLUCOMTR-MCNC: 189 MG/DL — HIGH (ref 70–99)
GLUCOSE BLDC GLUCOMTR-MCNC: 97 MG/DL — SIGNIFICANT CHANGE UP (ref 70–99)
GLUCOSE SERPL-MCNC: 103 MG/DL — HIGH (ref 70–99)
HCT VFR BLD CALC: 36.7 % — LOW (ref 39–50)
HCV AB S/CO SERPL IA: 0.13 S/CO — SIGNIFICANT CHANGE UP (ref 0–0.99)
HCV AB SERPL-IMP: SIGNIFICANT CHANGE UP
HGB BLD-MCNC: 11 G/DL — LOW (ref 13–17)
IANC: 2.64 K/UL — SIGNIFICANT CHANGE UP (ref 1.8–7.4)
IMM GRANULOCYTES NFR BLD AUTO: 0.9 % — SIGNIFICANT CHANGE UP (ref 0–0.9)
LYMPHOCYTES # BLD AUTO: 1.15 K/UL — SIGNIFICANT CHANGE UP (ref 1–3.3)
LYMPHOCYTES # BLD AUTO: 24.6 % — SIGNIFICANT CHANGE UP (ref 13–44)
MAGNESIUM SERPL-MCNC: 1.8 MG/DL — SIGNIFICANT CHANGE UP (ref 1.6–2.6)
MCHC RBC-ENTMCNC: 21 PG — LOW (ref 27–34)
MCHC RBC-ENTMCNC: 30 GM/DL — LOW (ref 32–36)
MCV RBC AUTO: 70 FL — LOW (ref 80–100)
MONOCYTES # BLD AUTO: 0.7 K/UL — SIGNIFICANT CHANGE UP (ref 0–0.9)
MONOCYTES NFR BLD AUTO: 15 % — HIGH (ref 2–14)
NEUTROPHILS # BLD AUTO: 2.64 K/UL — SIGNIFICANT CHANGE UP (ref 1.8–7.4)
NEUTROPHILS NFR BLD AUTO: 56.2 % — SIGNIFICANT CHANGE UP (ref 43–77)
NRBC # BLD: 0 /100 WBCS — SIGNIFICANT CHANGE UP (ref 0–0)
NRBC # FLD: 0 K/UL — SIGNIFICANT CHANGE UP (ref 0–0)
PHOSPHATE SERPL-MCNC: 3.1 MG/DL — SIGNIFICANT CHANGE UP (ref 2.5–4.5)
PLATELET # BLD AUTO: 279 K/UL — SIGNIFICANT CHANGE UP (ref 150–400)
POTASSIUM SERPL-MCNC: 3.8 MMOL/L — SIGNIFICANT CHANGE UP (ref 3.5–5.3)
POTASSIUM SERPL-SCNC: 3.8 MMOL/L — SIGNIFICANT CHANGE UP (ref 3.5–5.3)
PROT SERPL-MCNC: 6.7 G/DL — SIGNIFICANT CHANGE UP (ref 6–8.3)
RBC # BLD: 5.24 M/UL — SIGNIFICANT CHANGE UP (ref 4.2–5.8)
RBC # FLD: 15.3 % — HIGH (ref 10.3–14.5)
SODIUM SERPL-SCNC: 137 MMOL/L — SIGNIFICANT CHANGE UP (ref 135–145)
TROPONIN T, HIGH SENSITIVITY RESULT: <6 NG/L — SIGNIFICANT CHANGE UP
WBC # BLD: 4.68 K/UL — SIGNIFICANT CHANGE UP (ref 3.8–10.5)
WBC # FLD AUTO: 4.68 K/UL — SIGNIFICANT CHANGE UP (ref 3.8–10.5)

## 2023-01-20 PROCEDURE — 93010 ELECTROCARDIOGRAM REPORT: CPT

## 2023-01-20 RX ORDER — HEPARIN SODIUM 5000 [USP'U]/ML
6000 INJECTION INTRAVENOUS; SUBCUTANEOUS EVERY 6 HOURS
Refills: 0 | Status: DISCONTINUED | OUTPATIENT
Start: 2023-01-20 | End: 2023-01-21

## 2023-01-20 RX ORDER — KETOROLAC TROMETHAMINE 30 MG/ML
30 SYRINGE (ML) INJECTION ONCE
Refills: 0 | Status: DISCONTINUED | OUTPATIENT
Start: 2023-01-20 | End: 2023-01-20

## 2023-01-20 RX ORDER — KETOROLAC TROMETHAMINE 30 MG/ML
15 SYRINGE (ML) INJECTION ONCE
Refills: 0 | Status: DISCONTINUED | OUTPATIENT
Start: 2023-01-20 | End: 2023-01-20

## 2023-01-20 RX ORDER — HEPARIN SODIUM 5000 [USP'U]/ML
3000 INJECTION INTRAVENOUS; SUBCUTANEOUS EVERY 6 HOURS
Refills: 0 | Status: DISCONTINUED | OUTPATIENT
Start: 2023-01-20 | End: 2023-01-21

## 2023-01-20 RX ORDER — HEPARIN SODIUM 5000 [USP'U]/ML
INJECTION INTRAVENOUS; SUBCUTANEOUS
Qty: 25000 | Refills: 0 | Status: DISCONTINUED | OUTPATIENT
Start: 2023-01-20 | End: 2023-01-21

## 2023-01-20 RX ORDER — HEPARIN SODIUM 5000 [USP'U]/ML
6000 INJECTION INTRAVENOUS; SUBCUTANEOUS ONCE
Refills: 0 | Status: COMPLETED | OUTPATIENT
Start: 2023-01-20 | End: 2023-01-20

## 2023-01-20 RX ADMIN — GABAPENTIN 200 MILLIGRAM(S): 400 CAPSULE ORAL at 17:27

## 2023-01-20 RX ADMIN — Medication 1 GRAM(S): at 17:25

## 2023-01-20 RX ADMIN — Medication 15 MILLIGRAM(S): at 23:20

## 2023-01-20 RX ADMIN — HEPARIN SODIUM 5000 UNIT(S): 5000 INJECTION INTRAVENOUS; SUBCUTANEOUS at 06:57

## 2023-01-20 RX ADMIN — HEPARIN SODIUM 6000 UNIT(S): 5000 INJECTION INTRAVENOUS; SUBCUTANEOUS at 23:40

## 2023-01-20 RX ADMIN — Medication 650 MILLIGRAM(S): at 18:21

## 2023-01-20 RX ADMIN — Medication 30 MILLIGRAM(S): at 10:30

## 2023-01-20 RX ADMIN — Medication 15 MILLIGRAM(S): at 22:50

## 2023-01-20 RX ADMIN — GABAPENTIN 200 MILLIGRAM(S): 400 CAPSULE ORAL at 06:56

## 2023-01-20 RX ADMIN — ATORVASTATIN CALCIUM 80 MILLIGRAM(S): 80 TABLET, FILM COATED ORAL at 21:17

## 2023-01-20 RX ADMIN — Medication 30 MILLIGRAM(S): at 10:10

## 2023-01-20 RX ADMIN — Medication 1 GRAM(S): at 12:06

## 2023-01-20 RX ADMIN — Medication 650 MILLIGRAM(S): at 18:45

## 2023-01-20 RX ADMIN — PANTOPRAZOLE SODIUM 40 MILLIGRAM(S): 20 TABLET, DELAYED RELEASE ORAL at 17:25

## 2023-01-20 RX ADMIN — HEPARIN SODIUM 5000 UNIT(S): 5000 INJECTION INTRAVENOUS; SUBCUTANEOUS at 17:27

## 2023-01-20 RX ADMIN — Medication 81 MILLIGRAM(S): at 12:05

## 2023-01-20 RX ADMIN — Medication 1 GRAM(S): at 06:56

## 2023-01-20 RX ADMIN — Medication 1: at 17:20

## 2023-01-20 RX ADMIN — PANTOPRAZOLE SODIUM 40 MILLIGRAM(S): 20 TABLET, DELAYED RELEASE ORAL at 06:57

## 2023-01-20 RX ADMIN — Medication 30 MILLILITER(S): at 11:15

## 2023-01-20 RX ADMIN — HEPARIN SODIUM 1300 UNIT(S)/HR: 5000 INJECTION INTRAVENOUS; SUBCUTANEOUS at 23:38

## 2023-01-20 RX ADMIN — Medication 50 MILLIGRAM(S): at 17:25

## 2023-01-20 NOTE — CHART NOTE - NSCHARTNOTEFT_GEN_A_CORE
ACP MEDICINE COVERAGE - Medicine Subsequent Hospital Care Note    HPI/Subjective: Patient presented to the ED on 1/18 with complaints of chest pain radiating to the left shoulder, palpitations, and an episode of vomiting. Stress test preformed and revealed small mild defect in basal inferolateral wall that is reversible suggestive of ischemia. Evaluated by Cardiologist and EP; patient planned for cath on 1/23. Heparin gtt initiated by provider per Cardiology recommendations.   Contacted by RN due to patient mentioning transient chest pain since admission that has no completely resolved. Writer went to bedside to evaluate the patient. Patient resting comfortably in bed, no acute distress noted. Patient notes that he has had chest pain X 3 weeks. Last week he presented to [facility] with similar complaints and was sent home stating he had normal EKG and bloodwork. Patient admits to experiencing worsening fatigue and chest pain 1/18 so he reported to [facility] for further evaluation. Since admission patient notes transient chest pain that is slightly mitigated by Tylenol or NSAIDs. During evaluation patient admits to be chest pain free however notes that chest pain resumes when he moves around or walks to the bathroom. Denies SOB, palpitations, nausea or vomiting at this time.     ROS:  +Chest pain and + fatigue. Denies fever, chills, diaphoresis, malaise, night sweats, generalized weakness, headache, changes in vision, dizziness, cough, sputum production, wheezing, hemoptysis, palpitations, shortness of breath, dyspnea on exertion, PND, dysphagia, new abdominal pain, nausea, vomiting, diarrhea, constipation, melena, hematochezia, dysuria, increased urinary frequency/urgency, hematuria, nocturia, numbness/weakness/tingling, any other complaints.    Vital Signs Last 24 Hrs  T(C): 37.2 (01-20-23 @ 22:00), Max: 37.2 (01-20-23 @ 21:57)  T(F): 98.9 (01-20-23 @ 22:00), Max: 98.9 (01-20-23 @ 21:57)  HR: 84 (01-20-23 @ 22:00) (73 - 100)  BP: 101/59 (01-20-23 @ 22:00) (96/53 - 108/71)  RR: 17 (01-20-23 @ 22:00) (14 - 20)  SpO2: 97% (01-20-23 @ 22:00) (97% - 99%) on (O2)    PHYSICAL EXAM:  Constitutional: NAD, well-developed, well-nourished  Ears, nose, Mouth, and Throat: normal external ears and nose, normal hearing, moist oral mucosa  Eyes: normal conjunctiva, EOMI, PEERL  Neck: supple, no JVD  Respiratory: Clear to auscultation bilaterally. No wheezes, rales or rhonchi. Normal respiratory effort  Cardiovascular: regular rate and rhythm, S1 and S2, no murmurs, rubs or gallops, no edema, 2+ peripheral pulses  Gastrointestinal: soft, nontender, nondistended, +bowel sounds, no hernia  Skin: warm, dry, no rash  Neurologic: sensation grossly intact, CN grossly intact, non-focal exam  Musculoskeletal: no clubbing, no cyanosis, no joint swelling  Psychiatric: A&Ox4, appropriate mood, affect    LABS:                        11.0   4.68  )-----------( 279      ( 20 Jan 2023 09:25 )             36.7     01-20    137  |  102  |  11  ----------------------------<  103<H>  3.8   |  26  |  0.59    Ca    8.4      20 Jan 2023 09:25  Phos  3.1     01-20  Mg     1.80     01-20    TPro  6.7  /  Alb  3.0<L>  /  TBili  0.4  /  DBili  x   /  AST  28  /  ALT  28  /  AlkPhos  86  01-20    PT/INR: PT: x | INR: x (01-20-23 @ 22:35)  PTT: 33.7 sec (01-20-23 @ 22:35)        I reviewed the above labs, radiology, medications, tests, telemetry, and EKG interpretation.    ASSESSMENT/PLAN:  -Chest pain w/u underway; plans for cath on 1/23   -Heparin gtt initiated earlier in the evening per Cardiology recommendations 2/2 to Xarelto being held  -EKG ordered STAT; no ischemic changes noted in comparison to earlier EKG on 1/20  -Cardiac enzymes ordered  -Will continue to monitor patient     Low complexity/risk (30min) ACP MEDICINE COVERAGE - Medicine Subsequent Hospital Care Note    HPI/Subjective: Patient presented to the ED on 1/18 with complaints of chest pain radiating to the left shoulder, palpitations, and an episode of vomiting. Stress test preformed and revealed small mild defect in basal inferolateral wall that is reversible suggestive of ischemia. Evaluated by Cardiologist and EP; patient planned for cath on 1/23. Heparin gtt initiated by provider per Cardiology recommendations.   Contacted by RN due to patient mentioning transient chest pain since admission that has no completely resolved. Writer went to bedside to evaluate the patient. Patient resting comfortably in bed, no acute distress noted. Patient notes that he has had chest pain X 3 weeks. Last week he presented to [facility] with similar complaints and was sent home stating he had normal EKG and bloodwork. Patient admits to experiencing worsening fatigue and chest pain 1/18 so he reported to [facility] for further evaluation. Since admission patient notes transient chest pain that is slightly mitigated by Tylenol or NSAIDs. During evaluation patient admits to be chest pain free however notes that chest pain resumes when he moves around or walks to the bathroom. Denies SOB, palpitations, nausea or vomiting at this time.     ROS:  +Chest pain and + fatigue. Denies fever, chills, diaphoresis, malaise, night sweats, generalized weakness, headache, changes in vision, dizziness, cough, sputum production, wheezing, hemoptysis, palpitations, shortness of breath, dyspnea on exertion, PND, dysphagia, new abdominal pain, nausea, vomiting, diarrhea, constipation, melena, hematochezia, dysuria, increased urinary frequency/urgency, hematuria, nocturia, numbness/weakness/tingling, any other complaints.    Vital Signs Last 24 Hrs  T(C): 37.2 (01-20-23 @ 22:00), Max: 37.2 (01-20-23 @ 21:57)  T(F): 98.9 (01-20-23 @ 22:00), Max: 98.9 (01-20-23 @ 21:57)  HR: 84 (01-20-23 @ 22:00) (73 - 100)  BP: 101/59 (01-20-23 @ 22:00) (96/53 - 108/71)  RR: 17 (01-20-23 @ 22:00) (14 - 20)  SpO2: 97% (01-20-23 @ 22:00) (97% - 99%) on (O2)    PHYSICAL EXAM:  Constitutional: NAD, well-developed, well-nourished  Ears, nose, Mouth, and Throat: normal external ears and nose, normal hearing, moist oral mucosa  Eyes: normal conjunctiva, EOMI, PEERL  Neck: supple, no JVD  Respiratory: Clear to auscultation bilaterally. No wheezes, rales or rhonchi. Normal respiratory effort  Cardiovascular: regular rate and rhythm, S1 and S2, no murmurs, rubs or gallops, no edema, 2+ peripheral pulses  Gastrointestinal: soft, nontender, nondistended, +bowel sounds, no hernia  Skin: warm, dry, no rash  Neurologic: sensation grossly intact, CN grossly intact, non-focal exam  Musculoskeletal: no clubbing, no cyanosis, no joint swelling  Psychiatric: A&Ox4, appropriate mood, affect    LABS:                        11.0   4.68  )-----------( 279      ( 20 Jan 2023 09:25 )             36.7     01-20    137  |  102  |  11  ----------------------------<  103<H>  3.8   |  26  |  0.59    Ca    8.4      20 Jan 2023 09:25  Phos  3.1     01-20  Mg     1.80     01-20    TPro  6.7  /  Alb  3.0<L>  /  TBili  0.4  /  DBili  x   /  AST  28  /  ALT  28  /  AlkPhos  86  01-20    PT/INR: PT: x | INR: x (01-20-23 @ 22:35)  PTT: 33.7 sec (01-20-23 @ 22:35)        I reviewed the above labs, radiology, medications, tests, telemetry, and EKG interpretation.    ASSESSMENT/PLAN:  -Chest pain w/u underway; plans for cath on 1/23   -Heparin gtt initiated earlier in the evening per Cardiology recommendations 2/2 to Xarelto being held  -EKG ordered STAT; no ischemic changes noted in comparison to earlier EKG on 1/20  -Cardiac enzymes negative   -Will continue to monitor patient     Low complexity/risk (30min)

## 2023-01-20 NOTE — PROGRESS NOTE ADULT - SUBJECTIVE AND OBJECTIVE BOX
Date of [id number]/20/2023    chief complaint:Chets pain    extended hpi: 57 year old male with PMH HTN, no obs CAD by Cath 4/2022, PAF on Xarelto, Asthma, gastric sleeve in 2015 and GERD presented to ER with complaints of chest pain x 1 day.  Pain is reproducible on exam, worse with movement of the chest and on exertion.  Denies any associate sx such as SOB, diaphoresis, N/V, palps or syncope.  Denies radiation of pain.  Denies heavy lifting.       DATE OF SERVICE: 01-20-23    Patient denies chest pain or shortness of breath.   Review of symptoms otherwise negative.    MEDICATIONS:  acetaminophen     Tablet .. 650 milliGRAM(s) Oral every 6 hours PRN  albuterol/ipratropium for Nebulization 3 milliLiter(s) Nebulizer every 6 hours PRN  aluminum hydroxide/magnesium hydroxide/simethicone Suspension 30 milliLiter(s) Oral every 6 hours PRN  aspirin enteric coated 81 milliGRAM(s) Oral daily  atorvastatin 80 milliGRAM(s) Oral at bedtime  dextrose 5%. 1000 milliLiter(s) IV Continuous <Continuous>  dextrose 5%. 1000 milliLiter(s) IV Continuous <Continuous>  dextrose 50% Injectable 25 Gram(s) IV Push once  dextrose 50% Injectable 12.5 Gram(s) IV Push once  dextrose 50% Injectable 25 Gram(s) IV Push once  dextrose Oral Gel 15 Gram(s) Oral once PRN  gabapentin 200 milliGRAM(s) Oral two times a day  glucagon  Injectable 1 milliGRAM(s) IntraMuscular once  heparin   Injectable 5000 Unit(s) SubCutaneous every 12 hours  insulin lispro (ADMELOG) corrective regimen sliding scale   SubCutaneous three times a day before meals  insulin lispro (ADMELOG) corrective regimen sliding scale   SubCutaneous at bedtime  melatonin 3 milliGRAM(s) Oral at bedtime PRN  pantoprazole    Tablet 40 milliGRAM(s) Oral two times a day  sucralfate 1 Gram(s) Oral four times a day  traZODone 50 milliGRAM(s) Oral daily      LABS:                        11.0   4.68  )-----------( 279      ( 20 Jan 2023 09:25 )             36.7       Hemoglobin: 11.0 g/dL (01-20 @ 09:25)  Hemoglobin: 10.0 g/dL (01-19 @ 03:29)      01-20    137  |  102  |  11  ----------------------------<  103<H>  3.8   |  26  |  0.59    Ca    8.4      20 Jan 2023 09:25  Phos  3.1     01-20  Mg     1.80     01-20    TPro  6.7  /  Alb  3.0<L>  /  TBili  0.4  /  DBili  x   /  AST  28  /  ALT  28  /  AlkPhos  86  01-20    Creatinine Trend: 0.59<--, 0.64<--    COAGS:     CARDIAC MARKERS ( 20 Jan 2023 09:25 )  x     / x     / 60 U/L / x     / <1.0 ng/mL        PHYSICAL EXAM:  T(C): 36.4 (01-20-23 @ 09:20), Max: 37.1 (01-19-23 @ 18:00)  HR: 86 (01-20-23 @ 09:20) (76 - 100)  BP: 108/71 (01-20-23 @ 09:20) (97/51 - 113/71)  RR: 16 (01-20-23 @ 09:20) (16 - 20)  SpO2: 99% (01-20-23 @ 09:20) (97% - 99%)  Wt(kg): --    I&O's Summary      General: Well nourished in no acute distress. Alert and Oriented * 3.   Head: Normocephalic and atraumatic.   Neck: No JVD. No bruits. Supple. Does not appear to be enlarged.   Cardiovascular: + S1,S2 ; RRR Soft systolic murmur at the left lower sternal border. No rubs noted.    Lungs: CTA b/l. No rhonchi, rales or wheezes.   Abdomen: + BS, soft. Non tender. Non distended. No rebound. No guarding.   Extremities: No clubbing/cyanosis/edema.   Neurologic: Moves all four extremities. Full range of motion.   Skin: Warm and moist. The patient's skin has normal elasticity and good skin turgor.   Psychiatric: Appropriate mood and affect.  Musculoskeletal: Normal range of motion, normal strength        ECG:  	NSR    < from: Transthoracic Echocardiogram (01.19.23 @ 07:48) >  CONCLUSIONS:  1. Normal mitral valve. Mild mitral regurgitation.  2. Normal left ventricular internal dimensions and wall  thicknesses.  3. Normal left ventricular systolic function. No segmental  wall motion abnormalities.  4. Normal left ventricular diastolic function.  5. Normal right ventricular size and function.  ------------------------------------------------------------------------  Confirmed on  1/19/2023 - 08:55:08 by Jorge Kim M.D.,  [facility], [facility]    < end of copied text >    < from: Cardiac Catheterization (04.07.22 @ 09:48) >  VENTRICLES: No left ventriculogram was performed.  CORONARY VESSELS: The coronary circulation is right dominant.  LM:   --  LM: Normal.  LAD:   --  LAD: Angiography showed minor luminal irregularities with no  flow limiting lesions.  CX:   --  Circumflex: Angiography showed minor luminal irregularities with  no flow limiting lesions.  RCA:   --  RCA: Angiography showed no evidence of disease.  COMPLICATIONS: Ventricular tachycardia occurred during the cath lab visit.  This complication required cardioversion.  DIAGNOSTIC IMPRESSIONS: There is mild irregularity of the coronary anatomy.  DIAGNOSTIC RECOMMENDATIONS: Medical management is recommended.  Prepared and signed by  Vinny Doty MD  Signed 04/07/2022 10:41:32    < end of copied text >        Stress:  NUCLEAR FINDINGS:  The left ventricle was normal in size. There is a small,  mild defect in basal inferolateral wall that is  reversible, suggestive of ischemia.  ------------------------------------------------------------------------  GATED ANALYSIS:  Post-stress gated wall motion analysis was performed (LVEF  = 58 %;LVEDV = 72 ml.), revealing normal LV function.  ------------------------------------------------------------------------  IMPRESSIONS:Abnormal Study  * The left ventricle was normal in size. There is a small,  mild defect in basal inferolateral wall that is  reversible, suggestive of ischemia.  * Post-stress gated wall motion analysis was performed  (LVEF = 58 %;LVEDV = 72 ml.), revealing normal LV  function.      ASSESSMENT/PLAN: 57 year old male with PMH HTN, no obs CAD by Cath 4/2022, PAF on XArelto 4/2022, Asthma, gastric sleeve in 2015 and GERD presented to ER with complaints of chest pain x 1 day.  Pain is reproducible on exam, worse with movement of the chest and on exertion    1. Chest Pain  --ACS ruled out  --LHC <1 year ago without any CAD  --TTE with structurally normal heart, stress abnormal  - LHC on monday, please check covid on sunday and make NPO after midnight  - continue to hold xarelto, place on heparin gtt    2. DM  --per chart, resolved after weight loss, however A1c is 7    3. HTN  --cont home Metoprolol    4. hx PAF  --per chart, PAF post cath in 4/2022, cont Xarelto    Yarelis Manzano MD  Pager: [phone]

## 2023-01-20 NOTE — CHART NOTE - NSCHARTNOTEFT_GEN_A_CORE
Per chart review noted Cardiology recommendations from earlier in the day to start patient on heparin gtt as previously on Xarelto for atrial fibrillation and has been held in preparation for cath. Confirmed with Cardiologist,  and attending  who confirmed initiation of heparin gtt. Heparin to be started now and held 1/23 in preparation for cath per MD.

## 2023-01-20 NOTE — CONSULT NOTE ADULT - SUBJECTIVE AND OBJECTIVE BOX
EP Attending    HISTORY OF PRESENT ILLNESS: HPI:  This is a 56 y/o M with pmhx of DM, CVA, Asthma, presented to the ED for new onset chest pain. The patient had been getting chest pain that is on and off with no particular trigger and pattern. Yesterday the patient was working about had sudden onset of chest pain with episode of diaphoresis and "feeling warm". The patient also endorsed that chest pain wound worsen with exertion and walking. The patient reports still having chest pain but it is "not as bad" as before. Denies shortness of breath. He had an angiogram done last year which he said was normal. No family hx of cardiac disease or prior hx of MI. Reported hx of DM but is not on any medications. He also gets occasional palpitations, no LOC.  The patient was in the CDU and was found to have a positive stress test showing inferior wall motion abnormalities. Admit for angiogram. (19 Jan 2023 22:52)    Brought back to hospital for abnormal stress test results, needs coronary angio.  He has a history of paroxysmal AFib, and takes Xarelto for secondary prevention of stroke. He has been compliant on this medication.  Reports no palpitations, syncope or near-syncope at home.  A 10 pt ROS is otherwise negative. EP called for assistance with anticoag management periop for coronary angiogram.    PAST MEDICAL & SURGICAL HISTORY:  H/O: Obesity  s/p gastric sleeve in 2015, lost ~75 lbs  DM (diabetes mellitus)  resolved with weight loss surgery  Hypertension  on diet control  Asthma  intubated x 2 in 1992, hospitalized in 2011, currently stable, last used rescue inhaler 7 months ago  SHARMIN (obstructive sleep apnea)  resolved after weight loss surgery  Pilonidal cyst with abscess  had surgery  Lower back pain  GERD (gastroesophageal reflux disease)  S/P tonsillectomy  S/P appendectomy  S/P hernia repair  bilateral inguinal hernia repair  S/P laparoscopic sleeve gastrectomy  2015  S/P sinus surgery  with septoplasty    MEDICATIONS  (STANDING):  aspirin enteric coated 81 milliGRAM(s) Oral daily  atorvastatin 80 milliGRAM(s) Oral at bedtime  dextrose 5%. 1000 milliLiter(s) (50 mL/Hr) IV Continuous <Continuous>  dextrose 5%. 1000 milliLiter(s) (100 mL/Hr) IV Continuous <Continuous>  dextrose 50% Injectable 25 Gram(s) IV Push once  dextrose 50% Injectable 12.5 Gram(s) IV Push once  dextrose 50% Injectable 25 Gram(s) IV Push once  gabapentin 200 milliGRAM(s) Oral two times a day  glucagon  Injectable 1 milliGRAM(s) IntraMuscular once  heparin   Injectable 5000 Unit(s) SubCutaneous every 12 hours  insulin lispro (ADMELOG) corrective regimen sliding scale   SubCutaneous three times a day before meals  insulin lispro (ADMELOG) corrective regimen sliding scale   SubCutaneous at bedtime  pantoprazole    Tablet 40 milliGRAM(s) Oral two times a day  sucralfate 1 Gram(s) Oral four times a day  traZODone 50 milliGRAM(s) Oral daily      Allergies    Avelox (Unknown)    Intolerances    FAMILY HISTORY:  Family history of diabetes mellitus (DM)  mother - also CVA    Family history of cerebrovascular accident (CVA) in father    FH: leukemia  brother    Non-contributary for premature coronary disease or sudden cardiac death    SOCIAL HISTORY:    [x ] Non-smoker  [ ] Smoker  [ ] Alcohol      PHYSICAL EXAM:  T(C): 36.6 (01-20-23 @ 13:03), Max: 37.1 (01-19-23 @ 18:00)  HR: 73 (01-20-23 @ 13:03) (73 - 100)  BP: 96/53 (01-20-23 @ 13:03) (96/53 - 113/71)  RR: 14 (01-20-23 @ 13:03) (14 - 20)  SpO2: 97% (01-20-23 @ 13:03) (97% - 99%)  Wt(kg): --    General: Well nourished, no acute distress, alert and oriented x 3  Head: normocephalic, no trauma  Neck: no JVD, no bruit, supple, not enlarged  CV: S1S2, no S3, regular rate, rhythm is SINUS, no murmurs.    Lungs: clear BL, no rales or wheezes  Abdomen: bowel sounds +, soft, nontender, nondistended  Extremities: no clubbing, cyanosis or edema  Neuro: Moves all 4 extremities, sensation intact x 4 extremities  Skin: warm and moist, normal turgor  Psych: Mood and affect are appropriate for circumstances  MSK: normal range of motion and strength x4 extremities.    TELEMETRY: NSR	    ECG: NSR 	  Echo:   < from: Transthoracic Echocardiogram (01.19.23 @ 07:48) >  DIMENSIONS:  Dimensions:     Normal Values:  LA:     3.3 cm    2.0 - 4.0 cm  Ao:     2.8 cm    2.0 - 3.8 cm  SEPTUM: 0.6 cm    0.6 - 1.2 cm  PWT:  0.6 cm    0.6 - 1.1 cm  LVIDd:  4.7 cm    3.0 - 5.6 cm  LVIDs:  3.0 cm    1.8 - 4.0 cm  Derived Variables:  LVMI: 48 g/m2  RWT: 0.25  Fractional short: 36 %  Ejection Fraction (Modified Jc Rule): 66 %  ------------------------------------------------------------------------  OBSERVATIONS:  Mitral Valve: Normal mitral valve. Mild mitral  regurgitation.  Aortic Root: Normal aortic root.  Aortic Valve: Normal trileaflet aortic valve. Minimal  aortic regurgitation.  Left Atrium: Normal left atrium.  LA volume index = 20  cc/m2.  Left Ventricle: Normal left ventricular systolic function.  No segmental wall motion abnormalities. Normal left  ventricular internal dimensions and wall thicknesses.  Normal left ventricular diastolic function.  Right Heart: Normal right atrium. Normal right ventricular  size and function. Normal tricuspid valve.  Minimal  tricuspid regurgitation. Normal pulmonic valve.  Pericardium/PleuraNormal pericardium with no pericardial  effusion.    < end of copied text >    NST:  < from: Nuclear Stress Test-Pharmacologic (01.19.23 @ 09:17) >  IMPRESSIONS:Abnormal Study  * The left ventricle was normal in size. There is a small,  mild defect in basal inferolateral wall that is  reversible, suggestive of ischemia.  * Post-stress gated wall motion analysis was performed  (LVEF = 58 %;LVEDV = 72 ml.), revealing normal LV  function.    < end of copied text >	  	  LABS:	 	                          11.0   4.68  )-----------( 279      ( 20 Jan 2023 09:25 )             36.7     01-20    137  |  102  |  11  ----------------------------<  103<H>  3.8   |  26  |  0.59    Ca    8.4      20 Jan 2023 09:25  Phos  3.1     01-20  Mg     1.80     01-20    TPro  6.7  /  Alb  3.0<L>  /  TBili  0.4  /  DBili  x   /  AST  28  /  ALT  28  /  AlkPhos  86  01-20      ASSESSMENT/PLAN: 	Mr Kraft is a 57y Male here with chest pain, and needing further workup after abnormal stress test.  He sees Dr Dakota Funk for general cardiology.  Xarelto is on hold (20mg daily) ahead of coronary angiogram.  [id number] is 4 (stroke, HTN, DM).  At this time no known hx of MI.  He has been compliant with this at home, so missing a few doses ahead of angiogram is low risk for allowing a CVA, and he does not require bridging.  Maintain telemetry.  If he has continued chest pain or has subsequent troponins positive, would start Heparin infusion over the weekend.  I will be covering our group, and will round on him over the weekend.      Rubin Campoverde M.D.  Cardiac Electrophysiology    office [phone]  pager [phone]

## 2023-01-20 NOTE — PATIENT PROFILE ADULT - FALL HARM RISK - UNIVERSAL INTERVENTIONS
Bed in lowest position, wheels locked, appropriate side rails in place/Call bell, personal items and telephone in reach/Instruct patient to call for assistance before getting out of bed or chair/Non-slip footwear when patient is out of bed/[place] to call system/Physically safe environment - no spills, clutter or unnecessary equipment/Purposeful Proactive Rounding/Room/bathroom lighting operational, light cord in reach

## 2023-01-20 NOTE — PROGRESS NOTE ADULT - SUBJECTIVE AND OBJECTIVE BOX
Patient is a 57y old  Male who presents with a chief complaint of chest pain (20 Jan 2023 15:14)    Date of servie : 01-20-23 @ 16:56  INTERVAL HPI/OVERNIGHT EVENTS:  T(C): 36.6 (01-20-23 @ 13:03), Max: 37.1 (01-19-23 @ 18:00)  HR: 73 (01-20-23 @ 13:03) (73 - 100)  BP: 96/53 (01-20-23 @ 13:03) (96/53 - 113/71)  RR: 14 (01-20-23 @ 13:03) (14 - 20)  SpO2: 97% (01-20-23 @ 13:03) (97% - 99%)  Wt(kg): --  I&O's Summary      LABS:                        11.0   4.68  )-----------( 279      ( 20 Jan 2023 09:25 )             36.7     01-20    137  |  102  |  11  ----------------------------<  103<H>  3.8   |  26  |  0.59    Ca    8.4      20 Jan 2023 09:25  Phos  3.1     01-20  Mg     1.80     01-20    TPro  6.7  /  Alb  3.0<L>  /  TBili  0.4  /  DBili  x   /  AST  28  /  ALT  28  /  AlkPhos  86  01-20        CAPILLARY BLOOD GLUCOSE      POCT Blood Glucose.: 97 mg/dL (20 Jan 2023 10:54)  POCT Blood Glucose.: 103 mg/dL (20 Jan 2023 07:34)  POCT Blood Glucose.: 209 mg/dL (19 Jan 2023 21:54)            MEDICATIONS  (STANDING):  aspirin enteric coated 81 milliGRAM(s) Oral daily  atorvastatin 80 milliGRAM(s) Oral at bedtime  dextrose 5%. 1000 milliLiter(s) (50 mL/Hr) IV Continuous <Continuous>  dextrose 5%. 1000 milliLiter(s) (100 mL/Hr) IV Continuous <Continuous>  dextrose 50% Injectable 25 Gram(s) IV Push once  dextrose 50% Injectable 12.5 Gram(s) IV Push once  dextrose 50% Injectable 25 Gram(s) IV Push once  gabapentin 200 milliGRAM(s) Oral two times a day  glucagon  Injectable 1 milliGRAM(s) IntraMuscular once  heparin   Injectable 5000 Unit(s) SubCutaneous every 12 hours  insulin lispro (ADMELOG) corrective regimen sliding scale   SubCutaneous three times a day before meals  insulin lispro (ADMELOG) corrective regimen sliding scale   SubCutaneous at bedtime  pantoprazole    Tablet 40 milliGRAM(s) Oral two times a day  sucralfate 1 Gram(s) Oral four times a day  traZODone 50 milliGRAM(s) Oral daily    MEDICATIONS  (PRN):  acetaminophen     Tablet .. 650 milliGRAM(s) Oral every 6 hours PRN Temp greater or equal to 38C (100.4F), Mild Pain (1 - 3)  albuterol/ipratropium for Nebulization 3 milliLiter(s) Nebulizer every 6 hours PRN Shortness of Breath and/or Wheezing  aluminum hydroxide/magnesium hydroxide/simethicone Suspension 30 milliLiter(s) Oral every 6 hours PRN Dyspepsia  dextrose Oral Gel 15 Gram(s) Oral once PRN Blood Glucose LESS THAN 70 milliGRAM(s)/deciliter  melatonin 3 milliGRAM(s) Oral at bedtime PRN Insomnia          PHYSICAL EXAM:  GENERAL: NAD, well-groomed, well-developed  HEAD:  Atraumatic, Normocephalic  CHEST/LUNG: Clear to percussion bilaterally; No rales, rhonchi, wheezing, or rubs  HEART: Regular rate and rhythm; No murmurs, rubs, or gallops  ABDOMEN: Soft, Nontender, Nondistended; Bowel sounds present  EXTREMITIES:  2+ Peripheral Pulses, No clubbing, cyanosis, or edema  LYMPH: No lymphadenopathy noted  SKIN: No rashes or lesions    Care Discussed with Consultants/Other Providers [ ] YES  [ ] NO

## 2023-01-21 LAB
ANION GAP SERPL CALC-SCNC: 10 MMOL/L — SIGNIFICANT CHANGE UP (ref 7–14)
APTT BLD: 131.6 SEC — SIGNIFICANT CHANGE UP (ref 27–36.3)
APTT BLD: 49.3 SEC — HIGH (ref 27–36.3)
APTT BLD: 98.4 SEC — HIGH (ref 27–36.3)
BUN SERPL-MCNC: 12 MG/DL — SIGNIFICANT CHANGE UP (ref 7–23)
CALCIUM SERPL-MCNC: 8.3 MG/DL — LOW (ref 8.4–10.5)
CHLORIDE SERPL-SCNC: 103 MMOL/L — SIGNIFICANT CHANGE UP (ref 98–107)
CK MB BLD-MCNC: <1.8 % — SIGNIFICANT CHANGE UP (ref 0–2.5)
CK MB BLD-MCNC: <1.8 % — SIGNIFICANT CHANGE UP (ref 0–2.5)
CK MB CFR SERPL CALC: <1 NG/ML — SIGNIFICANT CHANGE UP
CK MB CFR SERPL CALC: <1 NG/ML — SIGNIFICANT CHANGE UP
CK SERPL-CCNC: 55 U/L — SIGNIFICANT CHANGE UP (ref 30–200)
CK SERPL-CCNC: 56 U/L — SIGNIFICANT CHANGE UP (ref 30–200)
CO2 SERPL-SCNC: 23 MMOL/L — SIGNIFICANT CHANGE UP (ref 22–31)
CREAT SERPL-MCNC: 0.6 MG/DL — SIGNIFICANT CHANGE UP (ref 0.5–1.3)
EGFR: 111 ML/MIN/1.73M2 — SIGNIFICANT CHANGE UP
GLUCOSE BLDC GLUCOMTR-MCNC: 101 MG/DL — HIGH (ref 70–99)
GLUCOSE BLDC GLUCOMTR-MCNC: 114 MG/DL — HIGH (ref 70–99)
GLUCOSE BLDC GLUCOMTR-MCNC: 124 MG/DL — HIGH (ref 70–99)
GLUCOSE BLDC GLUCOMTR-MCNC: 293 MG/DL — HIGH (ref 70–99)
GLUCOSE SERPL-MCNC: 123 MG/DL — HIGH (ref 70–99)
HCT VFR BLD CALC: 34.4 % — LOW (ref 39–50)
HCT VFR BLD CALC: 38.9 % — LOW (ref 39–50)
HGB BLD-MCNC: 10.1 G/DL — LOW (ref 13–17)
HGB BLD-MCNC: 11.2 G/DL — LOW (ref 13–17)
MAGNESIUM SERPL-MCNC: 2 MG/DL — SIGNIFICANT CHANGE UP (ref 1.6–2.6)
MCHC RBC-ENTMCNC: 20.8 PG — LOW (ref 27–34)
MCHC RBC-ENTMCNC: 20.8 PG — LOW (ref 27–34)
MCHC RBC-ENTMCNC: 28.8 GM/DL — LOW (ref 32–36)
MCHC RBC-ENTMCNC: 29.4 GM/DL — LOW (ref 32–36)
MCV RBC AUTO: 70.9 FL — LOW (ref 80–100)
MCV RBC AUTO: 72.2 FL — LOW (ref 80–100)
NRBC # BLD: 0 /100 WBCS — SIGNIFICANT CHANGE UP (ref 0–0)
NRBC # BLD: 0 /100 WBCS — SIGNIFICANT CHANGE UP (ref 0–0)
NRBC # FLD: 0 K/UL — SIGNIFICANT CHANGE UP (ref 0–0)
NRBC # FLD: 0 K/UL — SIGNIFICANT CHANGE UP (ref 0–0)
PHOSPHATE SERPL-MCNC: 3.6 MG/DL — SIGNIFICANT CHANGE UP (ref 2.5–4.5)
PLATELET # BLD AUTO: 268 K/UL — SIGNIFICANT CHANGE UP (ref 150–400)
PLATELET # BLD AUTO: 283 K/UL — SIGNIFICANT CHANGE UP (ref 150–400)
POTASSIUM SERPL-MCNC: 4.3 MMOL/L — SIGNIFICANT CHANGE UP (ref 3.5–5.3)
POTASSIUM SERPL-SCNC: 4.3 MMOL/L — SIGNIFICANT CHANGE UP (ref 3.5–5.3)
RBC # BLD: 4.85 M/UL — SIGNIFICANT CHANGE UP (ref 4.2–5.8)
RBC # BLD: 5.39 M/UL — SIGNIFICANT CHANGE UP (ref 4.2–5.8)
RBC # FLD: 15.4 % — HIGH (ref 10.3–14.5)
RBC # FLD: 15.9 % — HIGH (ref 10.3–14.5)
SODIUM SERPL-SCNC: 136 MMOL/L — SIGNIFICANT CHANGE UP (ref 135–145)
TROPONIN T, HIGH SENSITIVITY RESULT: <6 NG/L — SIGNIFICANT CHANGE UP
TROPONIN T, HIGH SENSITIVITY RESULT: <6 NG/L — SIGNIFICANT CHANGE UP
WBC # BLD: 4.47 K/UL — SIGNIFICANT CHANGE UP (ref 3.8–10.5)
WBC # BLD: 6.11 K/UL — SIGNIFICANT CHANGE UP (ref 3.8–10.5)
WBC # FLD AUTO: 4.47 K/UL — SIGNIFICANT CHANGE UP (ref 3.8–10.5)
WBC # FLD AUTO: 6.11 K/UL — SIGNIFICANT CHANGE UP (ref 3.8–10.5)

## 2023-01-21 PROCEDURE — 93010 ELECTROCARDIOGRAM REPORT: CPT

## 2023-01-21 RX ORDER — SODIUM CHLORIDE 9 MG/ML
1000 INJECTION, SOLUTION INTRAVENOUS
Refills: 0 | Status: DISCONTINUED | OUTPATIENT
Start: 2023-01-21 | End: 2023-01-23

## 2023-01-21 RX ORDER — DEXTROSE 50 % IN WATER 50 %
15 SYRINGE (ML) INTRAVENOUS ONCE
Refills: 0 | Status: DISCONTINUED | OUTPATIENT
Start: 2023-01-21 | End: 2023-01-23

## 2023-01-21 RX ORDER — DEXTROSE 50 % IN WATER 50 %
25 SYRINGE (ML) INTRAVENOUS ONCE
Refills: 0 | Status: DISCONTINUED | OUTPATIENT
Start: 2023-01-21 | End: 2023-01-23

## 2023-01-21 RX ORDER — HEPARIN SODIUM 5000 [USP'U]/ML
1200 INJECTION INTRAVENOUS; SUBCUTANEOUS
Qty: 25000 | Refills: 0 | Status: DISCONTINUED | OUTPATIENT
Start: 2023-01-21 | End: 2023-01-22

## 2023-01-21 RX ORDER — DEXTROSE 50 % IN WATER 50 %
12.5 SYRINGE (ML) INTRAVENOUS ONCE
Refills: 0 | Status: DISCONTINUED | OUTPATIENT
Start: 2023-01-21 | End: 2023-01-23

## 2023-01-21 RX ORDER — LIDOCAINE 4 G/100G
1 CREAM TOPICAL EVERY 24 HOURS
Refills: 0 | Status: DISCONTINUED | OUTPATIENT
Start: 2023-01-21 | End: 2023-01-23

## 2023-01-21 RX ORDER — GLUCAGON INJECTION, SOLUTION 0.5 MG/.1ML
1 INJECTION, SOLUTION SUBCUTANEOUS ONCE
Refills: 0 | Status: DISCONTINUED | OUTPATIENT
Start: 2023-01-21 | End: 2023-01-23

## 2023-01-21 RX ORDER — IBUPROFEN 200 MG
600 TABLET ORAL EVERY 6 HOURS
Refills: 0 | Status: DISCONTINUED | OUTPATIENT
Start: 2023-01-21 | End: 2023-01-23

## 2023-01-21 RX ADMIN — HEPARIN SODIUM 1200 UNIT(S)/HR: 5000 INJECTION INTRAVENOUS; SUBCUTANEOUS at 23:25

## 2023-01-21 RX ADMIN — Medication 1 GRAM(S): at 13:02

## 2023-01-21 RX ADMIN — Medication 650 MILLIGRAM(S): at 09:23

## 2023-01-21 RX ADMIN — HEPARIN SODIUM 0 UNIT(S)/HR: 5000 INJECTION INTRAVENOUS; SUBCUTANEOUS at 07:18

## 2023-01-21 RX ADMIN — Medication 1 GRAM(S): at 23:45

## 2023-01-21 RX ADMIN — HEPARIN SODIUM 1300 UNIT(S)/HR: 5000 INJECTION INTRAVENOUS; SUBCUTANEOUS at 15:22

## 2023-01-21 RX ADMIN — PANTOPRAZOLE SODIUM 40 MILLIGRAM(S): 20 TABLET, DELAYED RELEASE ORAL at 05:15

## 2023-01-21 RX ADMIN — Medication 50 MILLIGRAM(S): at 13:02

## 2023-01-21 RX ADMIN — PANTOPRAZOLE SODIUM 40 MILLIGRAM(S): 20 TABLET, DELAYED RELEASE ORAL at 18:23

## 2023-01-21 RX ADMIN — Medication 1: at 22:12

## 2023-01-21 RX ADMIN — LIDOCAINE 1 PATCH: 4 CREAM TOPICAL at 09:42

## 2023-01-21 RX ADMIN — Medication 1 GRAM(S): at 05:15

## 2023-01-21 RX ADMIN — HEPARIN SODIUM 3000 UNIT(S): 5000 INJECTION INTRAVENOUS; SUBCUTANEOUS at 15:23

## 2023-01-21 RX ADMIN — Medication 3 MILLIGRAM(S): at 02:51

## 2023-01-21 RX ADMIN — Medication 81 MILLIGRAM(S): at 13:02

## 2023-01-21 RX ADMIN — GABAPENTIN 200 MILLIGRAM(S): 400 CAPSULE ORAL at 05:15

## 2023-01-21 RX ADMIN — LIDOCAINE 1 PATCH: 4 CREAM TOPICAL at 18:14

## 2023-01-21 RX ADMIN — Medication 600 MILLIGRAM(S): at 15:18

## 2023-01-21 RX ADMIN — Medication 1 GRAM(S): at 18:23

## 2023-01-21 RX ADMIN — ATORVASTATIN CALCIUM 80 MILLIGRAM(S): 80 TABLET, FILM COATED ORAL at 22:12

## 2023-01-21 RX ADMIN — Medication 650 MILLIGRAM(S): at 08:53

## 2023-01-21 RX ADMIN — HEPARIN SODIUM 1200 UNIT(S)/HR: 5000 INJECTION INTRAVENOUS; SUBCUTANEOUS at 20:39

## 2023-01-21 RX ADMIN — GABAPENTIN 200 MILLIGRAM(S): 400 CAPSULE ORAL at 18:23

## 2023-01-21 RX ADMIN — Medication 600 MILLIGRAM(S): at 15:48

## 2023-01-21 RX ADMIN — HEPARIN SODIUM 1100 UNIT(S)/HR: 5000 INJECTION INTRAVENOUS; SUBCUTANEOUS at 08:21

## 2023-01-21 RX ADMIN — HEPARIN SODIUM 1200 UNIT(S)/HR: 5000 INJECTION INTRAVENOUS; SUBCUTANEOUS at 16:57

## 2023-01-21 NOTE — PROGRESS NOTE ADULT - SUBJECTIVE AND OBJECTIVE BOX
EP Attending    HISTORY OF PRESENT ILLNESS: HPI:  This is a 56 y/o M with pmhx of DM, CVA, Asthma, presented to the ED for new onset chest pain. The patient had been getting chest pain that is on and off with no particular trigger and pattern. Yesterday the patient was working about had sudden onset of chest pain with episode of diaphoresis and "feeling warm". The patient also endorsed that chest pain wound worsen with exertion and walking. The patient reports still having chest pain but it is "not as bad" as before. Denies shortness of breath. He had an angiogram done last year which he said was normal. No family hx of cardiac disease or prior hx of MI. Reported hx of DM but is not on any medications. He also gets occasional palpitations, no LOC.  The patient was in the CDU and was found to have a positive stress test showing inferior wall motion abnormalities. Admit for angiogram. (19 Jan 2023 22:52)    Brought back to hospital for abnormal stress test results, needs coronary angio.  He has a history of paroxysmal AFib, and takes Xarelto for secondary prevention of stroke. He has been compliant on this medication.  Reports no palpitations, syncope or near-syncope at home.  A 10 pt ROS is otherwise negative. EP called for assistance with anticoag management periop for coronary angiogram.  Date of service 1/21- resting in bed. had palpation-reproducible chest pain earlier today. given NSAIDs.  anticoag on hold awaiting coronary angio this week.    PAST MEDICAL & SURGICAL HISTORY:  H/O: Obesity  s/p gastric sleeve in 2015, lost ~75 lbs  DM (diabetes mellitus)  resolved with weight loss surgery  Hypertension  on diet control  Asthma  intubated x 2 in 1992, hospitalized in 2011, currently stable, last used rescue inhaler 7 months ago  SHARMIN (obstructive sleep apnea)  resolved after weight loss surgery  Pilonidal cyst with abscess  had surgery  Lower back pain  GERD (gastroesophageal reflux disease)  S/P tonsillectomy  S/P appendectomy  S/P hernia repair  bilateral inguinal hernia repair  S/P laparoscopic sleeve gastrectomy  2015  S/P sinus surgery  with septoplasty    acetaminophen     Tablet .. 650 milliGRAM(s) Oral every 6 hours PRN  albuterol/ipratropium for Nebulization 3 milliLiter(s) Nebulizer every 6 hours PRN  aluminum hydroxide/magnesium hydroxide/simethicone Suspension 30 milliLiter(s) Oral every 6 hours PRN  aspirin enteric coated 81 milliGRAM(s) Oral daily  atorvastatin 80 milliGRAM(s) Oral at bedtime  dextrose 5%. 1000 milliLiter(s) IV Continuous <Continuous>  dextrose 5%. 1000 milliLiter(s) IV Continuous <Continuous>  dextrose 50% Injectable 25 Gram(s) IV Push once  dextrose 50% Injectable 12.5 Gram(s) IV Push once  dextrose 50% Injectable 25 Gram(s) IV Push once  dextrose Oral Gel 15 Gram(s) Oral once PRN  gabapentin 200 milliGRAM(s) Oral two times a day  glucagon  Injectable 1 milliGRAM(s) IntraMuscular once  heparin  Infusion. 1200 Unit(s)/Hr IV Continuous <Continuous>  ibuprofen  Tablet. 600 milliGRAM(s) Oral every 6 hours PRN  insulin lispro (ADMELOG) corrective regimen sliding scale   SubCutaneous three times a day before meals  insulin lispro (ADMELOG) corrective regimen sliding scale   SubCutaneous at bedtime  lidocaine   4% Patch 1 Patch Transdermal every 24 hours  melatonin 3 milliGRAM(s) Oral at bedtime PRN  pantoprazole    Tablet 40 milliGRAM(s) Oral two times a day  sucralfate 1 Gram(s) Oral four times a day  traZODone 50 milliGRAM(s) Oral daily                            11.2   6.11  )-----------( 283      ( 21 Jan 2023 05:49 )             38.9       01-21    136  |  103  |  12  ----------------------------<  123<H>  4.3   |  23  |  0.60    Ca    8.3<L>      21 Jan 2023 05:49  Phos  3.6     01-21  Mg     2.00     01-21    TPro  6.7  /  Alb  3.0<L>  /  TBili  0.4  /  DBili  x   /  AST  28  /  ALT  28  /  AlkPhos  86  01-20    CARDIAC MARKERS ( 21 Jan 2023 05:49 )  x     / x     / 56 U/L / x     / <1.0 ng/mL  CARDIAC MARKERS ( 20 Jan 2023 23:30 )  x     / x     / 55 U/L / x     / <1.0 ng/mL  CARDIAC MARKERS ( 20 Jan 2023 09:25 )  x     / x     / 60 U/L / x     / <1.0 ng/mL      T(C): 36.5 (01-21-23 @ 12:41), Max: 37.2 (01-20-23 @ 21:57)  HR: 80 (01-21-23 @ 12:41) (78 - 86)  BP: 91/58 (01-21-23 @ 12:41) (91/58 - 115/72)  RR: 18 (01-21-23 @ 12:41) (16 - 18)  SpO2: 97% (01-21-23 @ 12:41) (97% - 100%)  Wt(kg): --    I&O's Summary    General: Well nourished, no acute distress, alert and oriented x 3  Head: normocephalic, no trauma  Neck: no JVD, no bruit, supple, not enlarged  CV: S1S2, no S3, regular rate, rhythm is SINUS, no murmurs.    Lungs: clear BL, no rales or wheezes  Abdomen: bowel sounds +, soft, nontender, nondistended  Extremities: no clubbing, cyanosis or edema  Neuro: Moves all 4 extremities, sensation intact x 4 extremities  Skin: warm and moist, normal turgor  Psych: Mood and affect are appropriate for circumstances  MSK: normal range of motion and strength x4 extremities.    TELEMETRY: NSR	    ECG: NSR 	  Echo:   < from: Transthoracic Echocardiogram (01.19.23 @ 07:48) >  DIMENSIONS:  Dimensions:     Normal Values:  LA:     3.3 cm    2.0 - 4.0 cm  Ao:     2.8 cm    2.0 - 3.8 cm  SEPTUM: 0.6 cm    0.6 - 1.2 cm  PWT:  0.6 cm    0.6 - 1.1 cm  LVIDd:  4.7 cm    3.0 - 5.6 cm  LVIDs:  3.0 cm    1.8 - 4.0 cm  Derived Variables:  LVMI: 48 g/m2  RWT: 0.25  Fractional short: 36 %  Ejection Fraction (Modified Jc Rule): 66 %  ------------------------------------------------------------------------  OBSERVATIONS:  Mitral Valve: Normal mitral valve. Mild mitral  regurgitation.  Aortic Root: Normal aortic root.  Aortic Valve: Normal trileaflet aortic valve. Minimal  aortic regurgitation.  Left Atrium: Normal left atrium.  LA volume index = 20  cc/m2.  Left Ventricle: Normal left ventricular systolic function.  No segmental wall motion abnormalities. Normal left  ventricular internal dimensions and wall thicknesses.  Normal left ventricular diastolic function.  Right Heart: Normal right atrium. Normal right ventricular  size and function. Normal tricuspid valve.  Minimal  tricuspid regurgitation. Normal pulmonic valve.  Pericardium/PleuraNormal pericardium with no pericardial  effusion.    < end of copied text >    NST:  < from: Nuclear Stress Test-Pharmacologic (01.19.23 @ 09:17) >  IMPRESSIONS:Abnormal Study  * The left ventricle was normal in size. There is a small,  mild defect in basal inferolateral wall that is  reversible, suggestive of ischemia.  * Post-stress gated wall motion analysis was performed  (LVEF = 58 %;LVEDV = 72 ml.), revealing normal LV  function.    < end of copied text >	      ASSESSMENT/PLAN: 	Mr Kraft is a 57y Male here with chest pain, and needing further workup after abnormal stress test.  He sees Dr Dkaota Funk for general cardiology.  Xarelto is on hold (20mg daily) ahead of coronary angiogram.  [id number] is 4 (stroke, HTN, DM).  At this time no known hx of MI.  He has been compliant with this at home, so missing a few doses ahead of angiogram is low risk for allowing a CVA, and he does not require bridging.  Maintain telemetry.  If he has continued angina or has subsequent troponins positive, would start Heparin infusion over the weekend.  Agree w/ cardio NP's plan for NSAIDs for this type of chest pain he had today.    Rubin Campoverde M.D.  Cardiac Electrophysiology    office [phone]  pager [phone]

## 2023-01-21 NOTE — PROVIDER CONTACT NOTE (OTHER) - SITUATION
patient c/o chest pain 8/10 VS wnl, PA made aware, EKG completed, labs sent, patient assessed by PA, awaiting cath lab today
Pt complaining of pain in B/L axilla & across chest

## 2023-01-21 NOTE — PROGRESS NOTE ADULT - SUBJECTIVE AND OBJECTIVE BOX
Date of service 01/21/2023    chief complaint: Chest pain    extended hpi: 57 year old male with PMH HTN, no obs CAD by Cath 4/2022, PAF on Xarelto, Asthma, gastric sleeve in 2015 and GERD presented to ER with complaints of chest pain x 1 day.  Pain is reproducible on exam, worse with movement of the chest and on exertion.  Denies any associate sx such as SOB, diaphoresis, N/V, palps or syncope.  Denies radiation of pain.  Denies heavy lifting.     Patient reports ongoing chest pain, worse with movement and palpation.  Review of symptoms otherwise negative.    Review of Systems:   Constitutional: [ ] fevers, [ ] chills.   Skin: [ ] dry skin. [ ] rashes.  Psychiatric: [ ] depression, [ ] anxiety.   Gastrointestinal: [ ] BRBPR, [ ] melena.   Neurological: [ ] confusion. [ ] seizures. [ ] shuffling gait.   Ears,Nose,Mouth and Throat: [ ] ear pain [ ] sore throat.   Eyes: [ ] diplopia.   Respiratory: [ ] hemoptysis. [ ] shortness of breath  Cardiovascular: See HPI above  Hematologic/Lymphatic: [ ] anemia. [ ] painful nodes. [ ] prolonged bleeding.   Genitourinary: [ ] hematuria. [ ] flank pain.   Endocrine: [ ] significant change in weight. [ ] intolerance to heat and cold.     Review of systems [x ] otherwise negative, [ ] otherwise unable to obtain    FH: no family history of sudden cardiac death in first degree relatives    SH: [ ] tobacco, [ ] alcohol, [ ] drugs    acetaminophen     Tablet .. 650 milliGRAM(s) Oral every 6 hours PRN  albuterol/ipratropium for Nebulization 3 milliLiter(s) Nebulizer every 6 hours PRN  aluminum hydroxide/magnesium hydroxide/simethicone Suspension 30 milliLiter(s) Oral every 6 hours PRN  aspirin enteric coated 81 milliGRAM(s) Oral daily  atorvastatin 80 milliGRAM(s) Oral at bedtime  gabapentin 200 milliGRAM(s) Oral two times a day  glucagon  Injectable 1 milliGRAM(s) IntraMuscular once  heparin  Infusion. 1200 Unit(s)/Hr IV Continuous <Continuous>  ibuprofen  Tablet. 600 milliGRAM(s) Oral every 6 hours PRN  insulin lispro (ADMELOG) corrective regimen sliding scale   SubCutaneous three times a day before meals  insulin lispro (ADMELOG) corrective regimen sliding scale   SubCutaneous at bedtime  lidocaine   4% Patch 1 Patch Transdermal every 24 hours  melatonin 3 milliGRAM(s) Oral at bedtime PRN  pantoprazole    Tablet 40 milliGRAM(s) Oral two times a day  sucralfate 1 Gram(s) Oral four times a day  traZODone 50 milliGRAM(s) Oral daily                            11.2   6.11  )-----------( 283      ( 21 Jan 2023 05:49 )             38.9       136  |  103  |  12  ----------------------------<  123<H>  4.3   |  23  |  0.60    Ca    8.3<L>      21 Jan 2023 05:49  Phos  3.6     01-21  Mg     2.00     01-21    TPro  6.7  /  Alb  3.0<L>  /  TBili  0.4  /  DBili  x   /  AST  28  /  ALT  28  /  AlkPhos  86  01-20    CARDIAC MARKERS ( 21 Jan 2023 05:49 )  x     / x     / 56 U/L / x     / <1.0 ng/mL  CARDIAC MARKERS ( 20 Jan 2023 23:30 )  x     / x     / 55 U/L / x     / <1.0 ng/mL  CARDIAC MARKERS ( 20 Jan 2023 09:25 )  x     / x     / 60 U/L / x     / <1.0 ng/mL      T(C): 36.5 (01-21-23 @ 12:41), Max: 37.2 (01-20-23 @ 21:57)  HR: 80 (01-21-23 @ 12:41) (78 - 86)  BP: 91/58 (01-21-23 @ 12:41) (91/58 - 115/72)  RR: 18 (01-21-23 @ 12:41) (16 - 18)  SpO2: 97% (01-21-23 @ 12:41) (97% - 100%)    General: Well nourished in no acute distress. Alert and Oriented * 3.   Head: Normocephalic and atraumatic.   Neck: No JVD. No bruits. Supple. Does not appear to be enlarged.   Cardiovascular: + S1,S2 ; RRR Soft systolic murmur at the left lower sternal border. No rubs noted.    Lungs: CTA b/l. No rhonchi, rales or wheezes.   Abdomen: + BS, soft. Non tender. Non distended. No rebound. No guarding.   Extremities: No clubbing/cyanosis/edema.   Neurologic: Moves all four extremities. Full range of motion.   Skin: Warm and moist. The patient's skin has normal elasticity and good skin turgor.   Psychiatric: Appropriate mood and affect.  Musculoskeletal: Normal range of motion, normal strength        ECG:  	NSR    < from: Transthoracic Echocardiogram (01.19.23 @ 07:48) >  CONCLUSIONS:  1. Normal mitral valve. Mild mitral regurgitation.  2. Normal left ventricular internal dimensions and wall  thicknesses.  3. Normal left ventricular systolic function. No segmental  wall motion abnormalities.  4. Normal left ventricular diastolic function.  5. Normal right ventricular size and function.  ------------------------------------------------------------------------  Confirmed on  1/19/2023 - 08:55:08 by Jorge Kim M.D.,  [facility], [facility]    < end of copied text >    < from: Cardiac Catheterization (04.07.22 @ 09:48) >  VENTRICLES: No left ventriculogram was performed.  CORONARY VESSELS: The coronary circulation is right dominant.  LM:   --  LM: Normal.  LAD:   --  LAD: Angiography showed minor luminal irregularities with no  flow limiting lesions.  CX:   --  Circumflex: Angiography showed minor luminal irregularities with  no flow limiting lesions.  RCA:   --  RCA: Angiography showed no evidence of disease.  COMPLICATIONS: Ventricular tachycardia occurred during the cath lab visit.  This complication required cardioversion.  DIAGNOSTIC IMPRESSIONS: There is mild irregularity of the coronary anatomy.  DIAGNOSTIC RECOMMENDATIONS: Medical management is recommended.  Prepared and signed by  Vinny Doty MD  Signed 04/07/2022 10:41:32    < end of copied text >        Stress:  NUCLEAR FINDINGS:  The left ventricle was normal in size. There is a small,  mild defect in basal inferolateral wall that is  reversible, suggestive of ischemia.  ------------------------------------------------------------------------  GATED ANALYSIS:  Post-stress gated wall motion analysis was performed (LVEF  = 58 %;LVEDV = 72 ml.), revealing normal LV function.  ------------------------------------------------------------------------  IMPRESSIONS:Abnormal Study  * The left ventricle was normal in size. There is a small,  mild defect in basal inferolateral wall that is  reversible, suggestive of ischemia.  * Post-stress gated wall motion analysis was performed  (LVEF = 58 %;LVEDV = 72 ml.), revealing normal LV  function.      ASSESSMENT/PLAN: 57 year old male with PMH HTN, no obs CAD by Cath 4/2022, PAF on XArelto 4/2022, Asthma, gastric sleeve in 2015 and GERD presented to ER with complaints of chest pain x 1 day.  Pain is reproducible on exam, worse with movement of the chest and on exertion    1. Chest Pain  --ACS ruled out  --LHC <1 year ago without any CAD  --TTE with structurally normal heart, stress abnormal  - LHC on monday, please check covid on sunday and make NPO after midnight  - continue to hold xarelto, place on heparin gtt  --CPK and Trop negative today, trial Motrin 600 x1    2. DM  --per chart, resolved after weight loss, however A1c is 7- ENDO consulted, Dr Harris    3. HTN  --cont home Metoprolol    4. hx PAF  --per chart, PAF post cath in 4/2022, cont Xarelto

## 2023-01-21 NOTE — PROGRESS NOTE ADULT - SUBJECTIVE AND OBJECTIVE BOX
Patient is a 58y old  Male who presents with a chief complaint of chest pain (20 Jan 2023 16:56)    Date of servie : 01-21-23 @ 15:49  INTERVAL HPI/OVERNIGHT EVENTS:  T(C): 36.5 (01-21-23 @ 12:41), Max: 37.2 (01-20-23 @ 21:57)  HR: 80 (01-21-23 @ 12:41) (78 - 86)  BP: 91/58 (01-21-23 @ 12:41) (91/58 - 115/72)  RR: 18 (01-21-23 @ 12:41) (16 - 18)  SpO2: 97% (01-21-23 @ 12:41) (97% - 100%)  Wt(kg): --  I&O's Summary      LABS:                        11.2   6.11  )-----------( 283      ( 21 Jan 2023 05:49 )             38.9     01-21    136  |  103  |  12  ----------------------------<  123<H>  4.3   |  23  |  0.60    Ca    8.3<L>      21 Jan 2023 05:49  Phos  3.6     01-21  Mg     2.00     01-21    TPro  6.7  /  Alb  3.0<L>  /  TBili  0.4  /  DBili  x   /  AST  28  /  ALT  28  /  AlkPhos  86  01-20    PTT - ( 21 Jan 2023 14:02 )  PTT:49.3 sec    CAPILLARY BLOOD GLUCOSE      POCT Blood Glucose.: 114 mg/dL (21 Jan 2023 11:57)  POCT Blood Glucose.: 101 mg/dL (21 Jan 2023 08:36)  POCT Blood Glucose.: 189 mg/dL (20 Jan 2023 21:13)  POCT Blood Glucose.: 167 mg/dL (20 Jan 2023 17:00)            MEDICATIONS  (STANDING):  aspirin enteric coated 81 milliGRAM(s) Oral daily  atorvastatin 80 milliGRAM(s) Oral at bedtime  dextrose 5%. 1000 milliLiter(s) (50 mL/Hr) IV Continuous <Continuous>  dextrose 5%. 1000 milliLiter(s) (100 mL/Hr) IV Continuous <Continuous>  dextrose 50% Injectable 25 Gram(s) IV Push once  dextrose 50% Injectable 12.5 Gram(s) IV Push once  dextrose 50% Injectable 25 Gram(s) IV Push once  gabapentin 200 milliGRAM(s) Oral two times a day  glucagon  Injectable 1 milliGRAM(s) IntraMuscular once  heparin  Infusion.  Unit(s)/Hr (13 mL/Hr) IV Continuous <Continuous>  insulin lispro (ADMELOG) corrective regimen sliding scale   SubCutaneous three times a day before meals  insulin lispro (ADMELOG) corrective regimen sliding scale   SubCutaneous at bedtime  lidocaine   4% Patch 1 Patch Transdermal every 24 hours  pantoprazole    Tablet 40 milliGRAM(s) Oral two times a day  sucralfate 1 Gram(s) Oral four times a day  traZODone 50 milliGRAM(s) Oral daily    MEDICATIONS  (PRN):  acetaminophen     Tablet .. 650 milliGRAM(s) Oral every 6 hours PRN Temp greater or equal to 38C (100.4F), Mild Pain (1 - 3)  albuterol/ipratropium for Nebulization 3 milliLiter(s) Nebulizer every 6 hours PRN Shortness of Breath and/or Wheezing  aluminum hydroxide/magnesium hydroxide/simethicone Suspension 30 milliLiter(s) Oral every 6 hours PRN Dyspepsia  dextrose Oral Gel 15 Gram(s) Oral once PRN Blood Glucose LESS THAN 70 milliGRAM(s)/deciliter  heparin   Injectable 6000 Unit(s) IV Push every 6 hours PRN For aPTT less than 40  heparin   Injectable 3000 Unit(s) IV Push every 6 hours PRN For aPTT between 40 - 57  ibuprofen  Tablet. 600 milliGRAM(s) Oral every 6 hours PRN Temp greater or equal to 38C (100.4F), Moderate Pain (4 - 6), Severe Pain (7 - 10)  melatonin 3 milliGRAM(s) Oral at bedtime PRN Insomnia          PHYSICAL EXAM:  GENERAL: NAD, well-groomed, well-developed  HEAD:  Atraumatic, Normocephalic  CHEST/LUNG: Clear to percussion bilaterally; No rales, rhonchi, wheezing, or rubs  HEART: Regular rate and rhythm; No murmurs, rubs, or gallops  ABDOMEN: Soft, Nontender, Nondistended; Bowel sounds present  EXTREMITIES:  2+ Peripheral Pulses, No clubbing, cyanosis, or edema  LYMPH: No lymphadenopathy noted  SKIN: No rashes or lesions    Care Discussed with Consultants/Other Providers [ ] YES  [ ] NO

## 2023-01-22 LAB
ANION GAP SERPL CALC-SCNC: 9 MMOL/L — SIGNIFICANT CHANGE UP (ref 7–14)
APTT BLD: 61.9 SEC — HIGH (ref 27–36.3)
BUN SERPL-MCNC: 7 MG/DL — SIGNIFICANT CHANGE UP (ref 7–23)
CALCIUM SERPL-MCNC: 8.4 MG/DL — SIGNIFICANT CHANGE UP (ref 8.4–10.5)
CHLORIDE SERPL-SCNC: 102 MMOL/L — SIGNIFICANT CHANGE UP (ref 98–107)
CO2 SERPL-SCNC: 26 MMOL/L — SIGNIFICANT CHANGE UP (ref 22–31)
CREAT SERPL-MCNC: 0.54 MG/DL — SIGNIFICANT CHANGE UP (ref 0.5–1.3)
EGFR: 114 ML/MIN/1.73M2 — SIGNIFICANT CHANGE UP
GLUCOSE BLDC GLUCOMTR-MCNC: 116 MG/DL — HIGH (ref 70–99)
GLUCOSE BLDC GLUCOMTR-MCNC: 132 MG/DL — HIGH (ref 70–99)
GLUCOSE BLDC GLUCOMTR-MCNC: 168 MG/DL — HIGH (ref 70–99)
GLUCOSE BLDC GLUCOMTR-MCNC: 189 MG/DL — HIGH (ref 70–99)
GLUCOSE SERPL-MCNC: 91 MG/DL — SIGNIFICANT CHANGE UP (ref 70–99)
HCT VFR BLD CALC: 35.4 % — LOW (ref 39–50)
HGB BLD-MCNC: 10.6 G/DL — LOW (ref 13–17)
INR BLD: 1.11 RATIO — SIGNIFICANT CHANGE UP (ref 0.88–1.16)
MAGNESIUM SERPL-MCNC: 1.8 MG/DL — SIGNIFICANT CHANGE UP (ref 1.6–2.6)
MCHC RBC-ENTMCNC: 21 PG — LOW (ref 27–34)
MCHC RBC-ENTMCNC: 29.9 GM/DL — LOW (ref 32–36)
MCV RBC AUTO: 70.1 FL — LOW (ref 80–100)
NRBC # BLD: 0 /100 WBCS — SIGNIFICANT CHANGE UP (ref 0–0)
NRBC # FLD: 0 K/UL — SIGNIFICANT CHANGE UP (ref 0–0)
PHOSPHATE SERPL-MCNC: 3.4 MG/DL — SIGNIFICANT CHANGE UP (ref 2.5–4.5)
PLATELET # BLD AUTO: 284 K/UL — SIGNIFICANT CHANGE UP (ref 150–400)
POTASSIUM SERPL-MCNC: 3.6 MMOL/L — SIGNIFICANT CHANGE UP (ref 3.5–5.3)
POTASSIUM SERPL-SCNC: 3.6 MMOL/L — SIGNIFICANT CHANGE UP (ref 3.5–5.3)
PROTHROM AB SERPL-ACNC: 12.9 SEC — SIGNIFICANT CHANGE UP (ref 10.5–13.4)
RBC # BLD: 5.05 M/UL — SIGNIFICANT CHANGE UP (ref 4.2–5.8)
RBC # FLD: 15.8 % — HIGH (ref 10.3–14.5)
SARS-COV-2 RNA SPEC QL NAA+PROBE: SIGNIFICANT CHANGE UP
SODIUM SERPL-SCNC: 137 MMOL/L — SIGNIFICANT CHANGE UP (ref 135–145)
WBC # BLD: 5.16 K/UL — SIGNIFICANT CHANGE UP (ref 3.8–10.5)
WBC # FLD AUTO: 5.16 K/UL — SIGNIFICANT CHANGE UP (ref 3.8–10.5)

## 2023-01-22 RX ORDER — MAGNESIUM OXIDE 400 MG ORAL TABLET 241.3 MG
400 TABLET ORAL
Refills: 0 | Status: DISCONTINUED | OUTPATIENT
Start: 2023-01-22 | End: 2023-01-23

## 2023-01-22 RX ORDER — ONDANSETRON 8 MG/1
4 TABLET, FILM COATED ORAL ONCE
Refills: 0 | Status: COMPLETED | OUTPATIENT
Start: 2023-01-22 | End: 2023-01-22

## 2023-01-22 RX ORDER — POTASSIUM CHLORIDE 20 MEQ
40 PACKET (EA) ORAL ONCE
Refills: 0 | Status: COMPLETED | OUTPATIENT
Start: 2023-01-22 | End: 2023-01-22

## 2023-01-22 RX ADMIN — Medication 40 MILLIEQUIVALENT(S): at 08:54

## 2023-01-22 RX ADMIN — Medication 3 MILLIGRAM(S): at 21:44

## 2023-01-22 RX ADMIN — GABAPENTIN 200 MILLIGRAM(S): 400 CAPSULE ORAL at 17:20

## 2023-01-22 RX ADMIN — MAGNESIUM OXIDE 400 MG ORAL TABLET 400 MILLIGRAM(S): 241.3 TABLET ORAL at 17:24

## 2023-01-22 RX ADMIN — LIDOCAINE 1 PATCH: 4 CREAM TOPICAL at 08:55

## 2023-01-22 RX ADMIN — Medication 81 MILLIGRAM(S): at 11:44

## 2023-01-22 RX ADMIN — HEPARIN SODIUM 1200 UNIT(S)/HR: 5000 INJECTION INTRAVENOUS; SUBCUTANEOUS at 20:23

## 2023-01-22 RX ADMIN — Medication 600 MILLIGRAM(S): at 03:31

## 2023-01-22 RX ADMIN — GABAPENTIN 200 MILLIGRAM(S): 400 CAPSULE ORAL at 05:59

## 2023-01-22 RX ADMIN — Medication 1: at 17:22

## 2023-01-22 RX ADMIN — Medication 1 GRAM(S): at 11:44

## 2023-01-22 RX ADMIN — PANTOPRAZOLE SODIUM 40 MILLIGRAM(S): 20 TABLET, DELAYED RELEASE ORAL at 17:22

## 2023-01-22 RX ADMIN — PANTOPRAZOLE SODIUM 40 MILLIGRAM(S): 20 TABLET, DELAYED RELEASE ORAL at 05:59

## 2023-01-22 RX ADMIN — ATORVASTATIN CALCIUM 80 MILLIGRAM(S): 80 TABLET, FILM COATED ORAL at 21:44

## 2023-01-22 RX ADMIN — Medication 1 GRAM(S): at 17:22

## 2023-01-22 RX ADMIN — ONDANSETRON 4 MILLIGRAM(S): 8 TABLET, FILM COATED ORAL at 16:46

## 2023-01-22 RX ADMIN — MAGNESIUM OXIDE 400 MG ORAL TABLET 400 MILLIGRAM(S): 241.3 TABLET ORAL at 11:44

## 2023-01-22 RX ADMIN — Medication 50 MILLIGRAM(S): at 21:44

## 2023-01-22 RX ADMIN — HEPARIN SODIUM 1200 UNIT(S)/HR: 5000 INJECTION INTRAVENOUS; SUBCUTANEOUS at 08:40

## 2023-01-22 RX ADMIN — LIDOCAINE 1 PATCH: 4 CREAM TOPICAL at 18:34

## 2023-01-22 RX ADMIN — Medication 600 MILLIGRAM(S): at 04:23

## 2023-01-22 RX ADMIN — Medication 1 GRAM(S): at 05:59

## 2023-01-22 RX ADMIN — HEPARIN SODIUM 1200 UNIT(S)/HR: 5000 INJECTION INTRAVENOUS; SUBCUTANEOUS at 07:32

## 2023-01-22 NOTE — CONSULT NOTE ADULT - PROBLEM SELECTOR RECOMMENDATION 9
Will continue current insulin regimen for now. Will continue monitoring  blood sugars, will Follow up.  May get DC home on Farxiga 10mg PO daily, FU 3 months.  Patient counseled for compliance with consistent low carb diet.  .

## 2023-01-22 NOTE — CONSULT NOTE ADULT - SUBJECTIVE AND OBJECTIVE BOX
HPI:  This is a 58 y/o M with pmhx of DM, CVA, Asthma, presented to the ED for new onset chest pain. The patient had been getting chest pain that is on and off with no particular trigger and pattern. Yesterday the patient was working about had sudden onset of chest pain with episode of diaphoresis and "feeling warm". The patient also endorsed that chest pain wound worsen with exertion and walking. The patient reports still having chest pain but it is "not as bad" as before. Denies shortness of breath. He had an angiogram done last year which he said was normal. No family hx of cardiac disease or prior hx of MI. Reported hx of DM but is not on any medications. He also gets occasional palpitations, no LOC.    The patient was in the CDU and was found to have a positive stress test showing inferior wall motion abnormalities. Admit for angiogram. (19 Jan 2023 22:52)  Patient has history of diabetes but was not taking any hypoglycemic agents at home, no polyuria polydipsia. Patient follows up with PCP for diabetes management.    PAST MEDICAL & SURGICAL HISTORY:  H/O: Obesity  s/p gastric sleeve in 2015, lost ~75 lbs      DM (diabetes mellitus)  resolved with weight loss surgery      Hypertension  on diet control      Asthma  intubated x 2 in 1992, hospitalized in 2011, currently stable, last used rescue inhaler 7 months ago      SHARMIN (obstructive sleep apnea)  resolved after weight loss surgery      Pilonidal cyst with abscess  had surgery      Lower back pain      GERD (gastroesophageal reflux disease)      S/P tonsillectomy      S/P appendectomy      S/P hernia repair  bilateral inguinal hernia repair      S/P laparoscopic sleeve gastrectomy  2015      S/P sinus surgery  with septoplasty          FAMILY HISTORY:  Family history of diabetes mellitus (DM)  mother - also CVA    Family history of cerebrovascular accident (CVA) in father    FH: leukemia  brother        Social History:    Outpatient Medications:    MEDICATIONS  (STANDING):  aspirin enteric coated 81 milliGRAM(s) Oral daily  atorvastatin 80 milliGRAM(s) Oral at bedtime  dextrose 5%. 1000 milliLiter(s) (50 mL/Hr) IV Continuous <Continuous>  dextrose 5%. 1000 milliLiter(s) (100 mL/Hr) IV Continuous <Continuous>  dextrose 50% Injectable 25 Gram(s) IV Push once  dextrose 50% Injectable 12.5 Gram(s) IV Push once  dextrose 50% Injectable 25 Gram(s) IV Push once  gabapentin 200 milliGRAM(s) Oral two times a day  glucagon  Injectable 1 milliGRAM(s) IntraMuscular once  heparin  Infusion. 1200 Unit(s)/Hr (12 mL/Hr) IV Continuous <Continuous>  insulin lispro (ADMELOG) corrective regimen sliding scale   SubCutaneous three times a day before meals  insulin lispro (ADMELOG) corrective regimen sliding scale   SubCutaneous at bedtime  lidocaine   4% Patch 1 Patch Transdermal every 24 hours  magnesium oxide 400 milliGRAM(s) Oral three times a day with meals  pantoprazole    Tablet 40 milliGRAM(s) Oral two times a day  sucralfate 1 Gram(s) Oral four times a day  traZODone 50 milliGRAM(s) Oral daily    MEDICATIONS  (PRN):  acetaminophen     Tablet .. 650 milliGRAM(s) Oral every 6 hours PRN Temp greater or equal to 38C (100.4F), Mild Pain (1 - 3)  albuterol/ipratropium for Nebulization 3 milliLiter(s) Nebulizer every 6 hours PRN Shortness of Breath and/or Wheezing  aluminum hydroxide/magnesium hydroxide/simethicone Suspension 30 milliLiter(s) Oral every 6 hours PRN Dyspepsia  dextrose Oral Gel 15 Gram(s) Oral once PRN Blood Glucose LESS THAN 70 milliGRAM(s)/deciliter  ibuprofen  Tablet. 600 milliGRAM(s) Oral every 6 hours PRN Temp greater or equal to 38C (100.4F), Moderate Pain (4 - 6), Severe Pain (7 - 10)  melatonin 3 milliGRAM(s) Oral at bedtime PRN Insomnia      Allergies    Avelox (Unknown)    Intolerances      Review of Systems:  Constitutional: No fever, no chills  Eyes: No blurry vision  Neuro: No tremors  HEENT: No pain, no neck swelling  Cardiovascular: No chest pain, no palpitations  Respiratory: No SOB, no cough  GI: No nausea, vomiting, abdominal pain  : No dysuria  Skin: no rash  MSK: Has leg swelling.  Psych: no depression  Endocrine: no polyuria, polydipsia    UNABLE TO OBTAIN    ALL OTHER SYSTEMS REVIEWED AND NEGATIVE        PHYSICAL EXAM:  VITALS: T(C): 36.8 (01-22-23 @ 05:32)  T(F): 98.3 (01-22-23 @ 05:32), Max: 98.6 (01-22-23 @ 01:37)  HR: 75 (01-22-23 @ 05:32) (75 - 81)  BP: 110/67 (01-22-23 @ 05:32) (91/58 - 111/74)  RR:  (16 - 18)  SpO2:  (97% - 100%)  Wt(kg): --  GENERAL: NAD, well-groomed, well-developed  EYES: No proptosis, no lid lag  HEENT:  Atraumatic, Normocephalic  THYROID: Normal size, no palpable nodules  RESPIRATORY: Clear to auscultation bilaterally; No rales, rhonchi, wheezing  CARDIOVASCULAR: Si S2, No murmurs;  GI: Soft, non distended, normal bowel sounds  SKIN: Dry, intact, No rashes or lesions  MUSCULOSKELETAL: Has BL lower extremity edema.  NEURO:  no tremor, sensation decreased in feet BL,    POCT Blood Glucose.: 132 mg/dL (01-22-23 @ 07:46)  POCT Blood Glucose.: 293 mg/dL (01-21-23 @ 21:33)  POCT Blood Glucose.: 124 mg/dL (01-21-23 @ 17:14)  POCT Blood Glucose.: 114 mg/dL (01-21-23 @ 11:57)  POCT Blood Glucose.: 101 mg/dL (01-21-23 @ 08:36)  POCT Blood Glucose.: 189 mg/dL (01-20-23 @ 21:13)  POCT Blood Glucose.: 167 mg/dL (01-20-23 @ 17:00)  POCT Blood Glucose.: 97 mg/dL (01-20-23 @ 10:54)  POCT Blood Glucose.: 103 mg/dL (01-20-23 @ 07:34)  POCT Blood Glucose.: 209 mg/dL (01-19-23 @ 21:54)                            10.6   5.16  )-----------( 284      ( 22 Jan 2023 06:55 )             35.4       01-22    137  |  102  |  7   ----------------------------<  91  3.6   |  26  |  0.54    eGFR: 114    Ca    8.4      01-22  Mg     1.80     01-22  Phos  3.4     01-22    TPro  6.7  /  Alb  3.0<L>  /  TBili  0.4  /  DBili  x   /  AST  28  /  ALT  28  /  AlkPhos  86  01-20      Thyroid Function Tests:              Radiology:

## 2023-01-22 NOTE — CONSULT NOTE ADULT - ASSESSMENT
Assessment  DMT2: 58y Male with DM T2 with hyperglycemia admitted with chest pain,  A1C is 7.1%, patient was not on any hypoglycemic agents at home, now on insulin coverage, blood sugars trending down, no hypoglycemic episode,  eating meals,  non compliant with low carb diet.  Patient is high risk with high level decision making due to uncontrolled diabetes, has increased risk for complications. Tight sugar control is not recommended for this patient.  Chest pain: Being worked up, on medications, monitored, asymptomatic.  HTN: On antihypertensive medications, monitored, asymptomatic.            Cesar Harris MD  Cell:  [phone]  Office: [phone]               Assessment  DMT2: 58y Male with DM T2 with hyperglycemia admitted with chest pain,  A1C is 7.1%, patient was not on any hypoglycemic agents at home, now on insulin coverage, blood sugars trending down, no hypoglycemic episode,  eating meals,  non compliant with low carb diet.  Patient is high risk with high level decision making due to uncontrolled diabetes, has increased risk for complications.  Chest pain: Being worked up, on medications, monitored, asymptomatic.  HTN: On antihypertensive medications, monitored, asymptomatic.            Cesar Harris MD  Cell:  [phone]  Office: [phone]

## 2023-01-22 NOTE — PROGRESS NOTE ADULT - SUBJECTIVE AND OBJECTIVE BOX
EP Attending    HISTORY OF PRESENT ILLNESS: HPI:  This is a 56 y/o M with pmhx of DM, CVA, Asthma, presented to the ED for new onset chest pain. The patient had been getting chest pain that is on and off with no particular trigger and pattern. Yesterday the patient was working about had sudden onset of chest pain with episode of diaphoresis and "feeling warm". The patient also endorsed that chest pain wound worsen with exertion and walking. The patient reports still having chest pain but it is "not as bad" as before. Denies shortness of breath. He had an angiogram done last year which he said was normal. No family hx of cardiac disease or prior hx of MI. Reported hx of DM but is not on any medications. He also gets occasional palpitations, no LOC.  The patient was in the CDU and was found to have a positive stress test showing inferior wall motion abnormalities. Admit for angiogram. (19 Jan 2023 22:52)    Brought back to hospital for abnormal stress test results, needs coronary angio.  He has a history of paroxysmal AFib, and takes Xarelto for secondary prevention of stroke. He has been compliant on this medication.  Reports no palpitations, syncope or near-syncope at home.  A 10 pt ROS is otherwise negative. EP called for assistance with anticoag management periop for coronary angiogram.  1/21- resting in bed. had palpation-reproducible chest pain earlier today. given NSAIDs.  anticoag on hold awaiting coronary angio this week.  Date of service 1/22- no new complaints. awaiting coronary angio.    PAST MEDICAL & SURGICAL HISTORY:  H/O: Obesity  s/p gastric sleeve in 2015, lost ~75 lbs  DM (diabetes mellitus)  resolved with weight loss surgery  Hypertension  on diet control  Asthma  intubated x 2 in 1992, hospitalized in 2011, currently stable, last used rescue inhaler 7 months ago  SHARMIN (obstructive sleep apnea)  resolved after weight loss surgery  Pilonidal cyst with abscess  had surgery  Lower back pain  GERD (gastroesophageal reflux disease)  S/P tonsillectomy  S/P appendectomy  S/P hernia repair  bilateral inguinal hernia repair  S/P laparoscopic sleeve gastrectomy  2015  S/P sinus surgery  with septoplasty    acetaminophen     Tablet .. 650 milliGRAM(s) Oral every 6 hours PRN  albuterol/ipratropium for Nebulization 3 milliLiter(s) Nebulizer every 6 hours PRN  aluminum hydroxide/magnesium hydroxide/simethicone Suspension 30 milliLiter(s) Oral every 6 hours PRN  aspirin enteric coated 81 milliGRAM(s) Oral daily  atorvastatin 80 milliGRAM(s) Oral at bedtime  dextrose 5%. 1000 milliLiter(s) IV Continuous <Continuous>  dextrose 5%. 1000 milliLiter(s) IV Continuous <Continuous>  dextrose 50% Injectable 25 Gram(s) IV Push once  dextrose 50% Injectable 12.5 Gram(s) IV Push once  dextrose 50% Injectable 25 Gram(s) IV Push once  dextrose Oral Gel 15 Gram(s) Oral once PRN  gabapentin 200 milliGRAM(s) Oral two times a day  glucagon  Injectable 1 milliGRAM(s) IntraMuscular once  heparin  Infusion. 1200 Unit(s)/Hr IV Continuous <Continuous>  ibuprofen  Tablet. 600 milliGRAM(s) Oral every 6 hours PRN  insulin lispro (ADMELOG) corrective regimen sliding scale   SubCutaneous three times a day before meals  insulin lispro (ADMELOG) corrective regimen sliding scale   SubCutaneous at bedtime  lidocaine   4% Patch 1 Patch Transdermal every 24 hours  magnesium oxide 400 milliGRAM(s) Oral three times a day with meals  melatonin 3 milliGRAM(s) Oral at bedtime PRN  pantoprazole    Tablet 40 milliGRAM(s) Oral two times a day  sucralfate 1 Gram(s) Oral four times a day  traZODone 50 milliGRAM(s) Oral daily                            10.6   5.16  )-----------( 284      ( 22 Jan 2023 06:55 )             35.4       01-22    137  |  102  |  7   ----------------------------<  91  3.6   |  26  |  0.54    Ca    8.4      22 Jan 2023 06:55  Phos  3.4     01-22  Mg     1.80     01-22      CARDIAC MARKERS ( 21 Jan 2023 05:49 )  x     / x     / 56 U/L / x     / <1.0 ng/mL  CARDIAC MARKERS ( 20 Jan 2023 23:30 )  x     / x     / 55 U/L / x     / <1.0 ng/mL  CARDIAC MARKERS ( 20 Jan 2023 09:25 )  x     / x     / 60 U/L / x     / <1.0 ng/mL    T(C): 36.8 (01-22-23 @ 13:35), Max: 37 (01-22-23 @ 01:37)  HR: 79 (01-22-23 @ 13:35) (75 - 81)  BP: 96/60 (01-22-23 @ 13:35) (96/60 - 111/74)  RR: 17 (01-22-23 @ 13:35) (16 - 18)  SpO2: 99% (01-22-23 @ 13:35) (97% - 100%)  Wt(kg): --    I&O's Summary    21 Jan 2023 07:01  -  22 Jan 2023 07:00  --------------------------------------------------------  IN: 360 mL / OUT: 1300 mL / NET: -940 mL      General: Well nourished, no acute distress, alert and oriented x 3  Head: normocephalic, no trauma  Neck: no JVD, no bruit, supple, not enlarged  CV: S1S2, no S3, regular rate, rhythm is SINUS, no murmurs.    Lungs: clear BL, no rales or wheezes  Abdomen: bowel sounds +, soft, nontender, nondistended  Extremities: no clubbing, cyanosis or edema  Neuro: Moves all 4 extremities, sensation intact x 4 extremities  Skin: warm and moist, normal turgor  Psych: Mood and affect are appropriate for circumstances  MSK: normal range of motion and strength x4 extremities.    TELEMETRY: NSR	    ECG: NSR 	  Echo:   < from: Transthoracic Echocardiogram (01.19.23 @ 07:48) >  DIMENSIONS:  Dimensions:     Normal Values:  LA:     3.3 cm    2.0 - 4.0 cm  Ao:     2.8 cm    2.0 - 3.8 cm  SEPTUM: 0.6 cm    0.6 - 1.2 cm  PWT:  0.6 cm    0.6 - 1.1 cm  LVIDd:  4.7 cm    3.0 - 5.6 cm  LVIDs:  3.0 cm    1.8 - 4.0 cm  Derived Variables:  LVMI: 48 g/m2  RWT: 0.25  Fractional short: 36 %  Ejection Fraction (Modified Jc Rule): 66 %  ------------------------------------------------------------------------  OBSERVATIONS:  Mitral Valve: Normal mitral valve. Mild mitral  regurgitation.  Aortic Root: Normal aortic root.  Aortic Valve: Normal trileaflet aortic valve. Minimal  aortic regurgitation.  Left Atrium: Normal left atrium.  LA volume index = 20  cc/m2.  Left Ventricle: Normal left ventricular systolic function.  No segmental wall motion abnormalities. Normal left  ventricular internal dimensions and wall thicknesses.  Normal left ventricular diastolic function.  Right Heart: Normal right atrium. Normal right ventricular  size and function. Normal tricuspid valve.  Minimal  tricuspid regurgitation. Normal pulmonic valve.  Pericardium/PleuraNormal pericardium with no pericardial  effusion.    < end of copied text >    NST:  < from: Nuclear Stress Test-Pharmacologic (01.19.23 @ 09:17) >  IMPRESSIONS:Abnormal Study  * The left ventricle was normal in size. There is a small,  mild defect in basal inferolateral wall that is  reversible, suggestive of ischemia.  * Post-stress gated wall motion analysis was performed  (LVEF = 58 %;LVEDV = 72 ml.), revealing normal LV  function.    < end of copied text >	    ASSESSMENT/PLAN: 	Mr Kraft is a 57y Male here with chest pain, and needing further workup after abnormal stress test.  He sees Dr Dakota Funk for general cardiology.  Xarelto is on hold (20mg daily) ahead of coronary angiogram.  [id number] is 4 (stroke, HTN, DM).  At this time no known hx of MI.  He has been compliant with this at home, so missing a few doses ahead of angiogram is low risk for allowing a CVA, and he does not require bridging.  Maintain telemetry.      Rubin Campoverde M.D.  Cardiac Electrophysiology    office [phone]  pager [phone]

## 2023-01-22 NOTE — PROGRESS NOTE ADULT - SUBJECTIVE AND OBJECTIVE BOX
Date of service 01/22/2023    chief complaint: Chest pain    extended hpi: 57 year old male with PMH HTN, no obs CAD by Cath 4/2022, PAF on Xarelto, Asthma, gastric sleeve in 2015 and GERD presented to ER with complaints of chest pain x 1 day.  Pain is reproducible on exam, worse with movement of the chest and on exertion.  Denies any associate sx such as SOB, diaphoresis, N/V, palps or syncope.  Denies radiation of pain.  Denies heavy lifting.      Tele stable     Review of Systems:   Constitutional: [ ] fevers, [ ] chills.   Skin: [ ] dry skin. [ ] rashes.  Psychiatric: [ ] depression, [ ] anxiety.   Gastrointestinal: [ ] BRBPR, [ ] melena.   Neurological: [ ] confusion. [ ] seizures. [ ] shuffling gait.   Ears,Nose,Mouth and Throat: [ ] ear pain [ ] sore throat.   Eyes: [ ] diplopia.   Respiratory: [ ] hemoptysis. [ ] shortness of breath  Cardiovascular: See HPI above  Hematologic/Lymphatic: [ ] anemia. [ ] painful nodes. [ ] prolonged bleeding.   Genitourinary: [ ] hematuria. [ ] flank pain.   Endocrine: [ ] significant change in weight. [ ] intolerance to heat and cold.     Review of systems [x ] otherwise negative, [ ] otherwise unable to obtain    FH: no family history of sudden cardiac death in first degree relatives    SH: [ ] tobacco, [ ] alcohol, [ ] drugs          acetaminophen     Tablet .. 650 milliGRAM(s) Oral every 6 hours PRN  albuterol/ipratropium for Nebulization 3 milliLiter(s) Nebulizer every 6 hours PRN  aluminum hydroxide/magnesium hydroxide/simethicone Suspension 30 milliLiter(s) Oral every 6 hours PRN  aspirin enteric coated 81 milliGRAM(s) Oral daily  atorvastatin 80 milliGRAM(s) Oral at bedtime  dextrose 5%. 1000 milliLiter(s) IV Continuous <Continuous>  dextrose 5%. 1000 milliLiter(s) IV Continuous <Continuous>  dextrose 50% Injectable 25 Gram(s) IV Push once  dextrose 50% Injectable 12.5 Gram(s) IV Push once  dextrose 50% Injectable 25 Gram(s) IV Push once  dextrose Oral Gel 15 Gram(s) Oral once PRN  gabapentin 200 milliGRAM(s) Oral two times a day  glucagon  Injectable 1 milliGRAM(s) IntraMuscular once  heparin  Infusion. 1200 Unit(s)/Hr IV Continuous <Continuous>  ibuprofen  Tablet. 600 milliGRAM(s) Oral every 6 hours PRN  insulin lispro (ADMELOG) corrective regimen sliding scale   SubCutaneous three times a day before meals  insulin lispro (ADMELOG) corrective regimen sliding scale   SubCutaneous at bedtime  lidocaine   4% Patch 1 Patch Transdermal every 24 hours  magnesium oxide 400 milliGRAM(s) Oral three times a day with meals  melatonin 3 milliGRAM(s) Oral at bedtime PRN  pantoprazole    Tablet 40 milliGRAM(s) Oral two times a day  sucralfate 1 Gram(s) Oral four times a day  traZODone 50 milliGRAM(s) Oral daily                            10.6   5.16  )-----------( 284      ( 22 Jan 2023 06:55 )             35.4       Hemoglobin: 10.6 g/dL (01-22 @ 06:55)  Hemoglobin: 10.1 g/dL (01-21 @ 22:40)  Hemoglobin: 11.2 g/dL (01-21 @ 05:49)  Hemoglobin: 11.0 g/dL (01-20 @ 09:25)  Hemoglobin: 10.0 g/dL (01-19 @ 03:29)      01-22    137  |  102  |  7   ----------------------------<  91  3.6   |  26  |  0.54    Ca    8.4      22 Jan 2023 06:55  Phos  3.4     01-22  Mg     1.80     01-22      Creatinine Trend: 0.54<--, 0.60<--, 0.59<--, 0.64<--    COAGS: PT/INR - ( 22 Jan 2023 06:55 )   PT: 12.9 sec;   INR: 1.11 ratio         PTT - ( 22 Jan 2023 06:55 )  PTT:61.9 sec    CARDIAC MARKERS ( 21 Jan 2023 05:49 )  x     / x     / 56 U/L / x     / <1.0 ng/mL  CARDIAC MARKERS ( 20 Jan 2023 23:30 )  x     / x     / 55 U/L / x     / <1.0 ng/mL  CARDIAC MARKERS ( 20 Jan 2023 09:25 )  x     / x     / 60 U/L / x     / <1.0 ng/mL        T(C): 36.8 (01-22-23 @ 05:32), Max: 37 (01-22-23 @ 01:37)  HR: 75 (01-22-23 @ 05:32) (75 - 81)  BP: 110/67 (01-22-23 @ 05:32) (91/58 - 111/74)  RR: 16 (01-22-23 @ 05:32) (16 - 18)  SpO2: 97% (01-22-23 @ 05:32) (97% - 100%)  Wt(kg): --    I&O's Summary    21 Jan 2023 07:01  -  22 Jan 2023 07:00  --------------------------------------------------------  IN: 360 mL / OUT: 1300 mL / NET: -940 mL        General: Well nourished in no acute distress. Alert and Oriented * 3.   Head: Normocephalic and atraumatic.   Neck: No JVD. No bruits. Supple. Does not appear to be enlarged.   Cardiovascular: + S1,S2 ; RRR Soft systolic murmur at the left lower sternal border. No rubs noted.    Lungs: CTA b/l. No rhonchi, rales or wheezes.   Abdomen: + BS, soft. Non tender. Non distended. No rebound. No guarding.   Extremities: No clubbing/cyanosis/edema.   Neurologic: Moves all four extremities. Full range of motion.   Skin: Warm and moist. The patient's skin has normal elasticity and good skin turgor.   Psychiatric: Appropriate mood and affect.  Musculoskeletal: Normal range of motion, normal strength        ECG:  	NSR    < from: Transthoracic Echocardiogram (01.19.23 @ 07:48) >  CONCLUSIONS:  1. Normal mitral valve. Mild mitral regurgitation.  2. Normal left ventricular internal dimensions and wall  thicknesses.  3. Normal left ventricular systolic function. No segmental  wall motion abnormalities.  4. Normal left ventricular diastolic function.  5. Normal right ventricular size and function.  ------------------------------------------------------------------------  Confirmed on  1/19/2023 - 08:55:08 by Jorge Kim M.D.,  [facility], [email or web address]    < end of copied text >    < from: Cardiac Catheterization (04.07.22 @ 09:48) >  VENTRICLES: No left ventriculogram was performed.  CORONARY VESSELS: The coronary circulation is right dominant.  LM:   --  LM: Normal.  LAD:   --  LAD: Angiography showed minor luminal irregularities with no  flow limiting lesions.  CX:   --  Circumflex: Angiography showed minor luminal irregularities with  no flow limiting lesions.  RCA:   --  RCA: Angiography showed no evidence of disease.  COMPLICATIONS: Ventricular tachycardia occurred during the cath lab visit.  This complication required cardioversion.  DIAGNOSTIC IMPRESSIONS: There is mild irregularity of the coronary anatomy.  DIAGNOSTIC RECOMMENDATIONS: Medical management is recommended.  Prepared and signed by  Vinny Doty MD  Signed 04/07/2022 10:41:32    < end of copied text >        Stress:  NUCLEAR FINDINGS:  The left ventricle was normal in size. There is a small,  mild defect in basal inferolateral wall that is  reversible, suggestive of ischemia.  ------------------------------------------------------------------------  GATED ANALYSIS:  Post-stress gated wall motion analysis was performed (LVEF  = 58 %;LVEDV = 72 ml.), revealing normal LV function.  ------------------------------------------------------------------------  IMPRESSIONS:Abnormal Study  * The left ventricle was normal in size. There is a small,  mild defect in basal inferolateral wall that is  reversible, suggestive of ischemia.  * Post-stress gated wall motion analysis was performed  (LVEF = 58 %;LVEDV = 72 ml.), revealing normal LV  function.      ASSESSMENT/PLAN: 57 year old male with PMH HTN, no obs CAD by Cath 4/2022, PAF on XArelto 4/2022, Asthma, gastric sleeve in 2015 and GERD presented to ER with complaints of chest pain x 1 day.  Pain is reproducible on exam, worse with movement of the chest and on exertion    1. Chest Pain  --ACS ruled out  --LHC <1 year ago without any CAD  --TTE with structurally normal heart, stress abnormal  - LHC on tomorrow , please check covid on today  and make NPO after midnight  - continue to hold xarelto, place on heparin gtt       2. DM  --per chart, resolved after weight loss, however A1c is 7- ENDO consulted, Dr Harris    3. HTN  --cont home Metoprolol    4. hx PAF  --per chart, PAF post cath in 4/2022, cont Xarelto     Date of service 01/22/2023    chief complaint: Chest pain    extended hpi: 57 year old male with PMH HTN, no obs CAD by Cath 4/2022, PAF on Xarelto, Asthma, gastric sleeve in 2015 and GERD presented to ER with complaints of chest pain x 1 day.  Pain is reproducible on exam, worse with movement of the chest and on exertion.  Denies any associate sx such as SOB, diaphoresis, N/V, palps or syncope.  Denies radiation of pain.  Denies heavy lifting.      Tele stable     Review of Systems:   Constitutional: [ ] fevers, [ ] chills.   Skin: [ ] dry skin. [ ] rashes.  Psychiatric: [ ] depression, [ ] anxiety.   Gastrointestinal: [ ] BRBPR, [ ] melena.   Neurological: [ ] confusion. [ ] seizures. [ ] shuffling gait.   Ears,Nose,Mouth and Throat: [ ] ear pain [ ] sore throat.   Eyes: [ ] diplopia.   Respiratory: [ ] hemoptysis. [ ] shortness of breath  Cardiovascular: See HPI above  Hematologic/Lymphatic: [ ] anemia. [ ] painful nodes. [ ] prolonged bleeding.   Genitourinary: [ ] hematuria. [ ] flank pain.   Endocrine: [ ] significant change in weight. [ ] intolerance to heat and cold.     Review of systems [x ] otherwise negative, [ ] otherwise unable to obtain    FH: no family history of sudden cardiac death in first degree relatives    SH: [ ] tobacco, [ ] alcohol, [ ] drugs     acetaminophen     Tablet .. 650 milliGRAM(s) Oral every 6 hours PRN  albuterol/ipratropium for Nebulization 3 milliLiter(s) Nebulizer every 6 hours PRN  aluminum hydroxide/magnesium hydroxide/simethicone Suspension 30 milliLiter(s) Oral every 6 hours PRN  aspirin enteric coated 81 milliGRAM(s) Oral daily  atorvastatin 80 milliGRAM(s) Oral at bedtime  dextrose 5%. 1000 milliLiter(s) IV Continuous <Continuous>  dextrose 5%. 1000 milliLiter(s) IV Continuous <Continuous>  dextrose 50% Injectable 25 Gram(s) IV Push once  dextrose 50% Injectable 12.5 Gram(s) IV Push once  dextrose 50% Injectable 25 Gram(s) IV Push once  dextrose Oral Gel 15 Gram(s) Oral once PRN  gabapentin 200 milliGRAM(s) Oral two times a day  glucagon  Injectable 1 milliGRAM(s) IntraMuscular once  heparin  Infusion. 1200 Unit(s)/Hr IV Continuous <Continuous>  ibuprofen  Tablet. 600 milliGRAM(s) Oral every 6 hours PRN  insulin lispro (ADMELOG) corrective regimen sliding scale   SubCutaneous three times a day before meals  insulin lispro (ADMELOG) corrective regimen sliding scale   SubCutaneous at bedtime  lidocaine   4% Patch 1 Patch Transdermal every 24 hours  magnesium oxide 400 milliGRAM(s) Oral three times a day with meals  melatonin 3 milliGRAM(s) Oral at bedtime PRN  pantoprazole    Tablet 40 milliGRAM(s) Oral two times a day  sucralfate 1 Gram(s) Oral four times a day  traZODone 50 milliGRAM(s) Oral daily                            10.6   5.16  )-----------( 284      ( 22 Jan 2023 06:55 )             35.4       Hemoglobin: 10.6 g/dL (01-22 @ 06:55)  Hemoglobin: 10.1 g/dL (01-21 @ 22:40)  Hemoglobin: 11.2 g/dL (01-21 @ 05:49)  Hemoglobin: 11.0 g/dL (01-20 @ 09:25)  Hemoglobin: 10.0 g/dL (01-19 @ 03:29)      01-22    137  |  102  |  7   ----------------------------<  91  3.6   |  26  |  0.54    Ca    8.4      22 Jan 2023 06:55  Phos  3.4     01-22  Mg     1.80     01-22      Creatinine Trend: 0.54<--, 0.60<--, 0.59<--, 0.64<--    COAGS: PT/INR - ( 22 Jan 2023 06:55 )   PT: 12.9 sec;   INR: 1.11 ratio         PTT - ( 22 Jan 2023 06:55 )  PTT:61.9 sec    CARDIAC MARKERS ( 21 Jan 2023 05:49 )  x     / x     / 56 U/L / x     / <1.0 ng/mL  CARDIAC MARKERS ( 20 Jan 2023 23:30 )  x     / x     / 55 U/L / x     / <1.0 ng/mL  CARDIAC MARKERS ( 20 Jan 2023 09:25 )  x     / x     / 60 U/L / x     / <1.0 ng/mL        T(C): 36.8 (01-22-23 @ 05:32), Max: 37 (01-22-23 @ 01:37)  HR: 75 (01-22-23 @ 05:32) (75 - 81)  BP: 110/67 (01-22-23 @ 05:32) (91/58 - 111/74)  RR: 16 (01-22-23 @ 05:32) (16 - 18)  SpO2: 97% (01-22-23 @ 05:32) (97% - 100%)  Wt(kg): --    I&O's Summary    21 Jan 2023 07:01  -  22 Jan 2023 07:00  --------------------------------------------------------  IN: 360 mL / OUT: 1300 mL / NET: -940 mL        General: Well nourished in no acute distress. Alert and Oriented * 3.   Head: Normocephalic and atraumatic.   Neck: No JVD. No bruits. Supple. Does not appear to be enlarged.   Cardiovascular: + S1,S2 ; RRR Soft systolic murmur at the left lower sternal border. No rubs noted.    Lungs: CTA b/l. No rhonchi, rales or wheezes.   Abdomen: + BS, soft. Non tender. Non distended. No rebound. No guarding.   Extremities: No clubbing/cyanosis/edema.   Neurologic: Moves all four extremities. Full range of motion.   Skin: Warm and moist. The patient's skin has normal elasticity and good skin turgor.   Psychiatric: Appropriate mood and affect.  Musculoskeletal: Normal range of motion, normal strength        ECG:  	NSR    < from: Transthoracic Echocardiogram (01.19.23 @ 07:48) >  CONCLUSIONS:  1. Normal mitral valve. Mild mitral regurgitation.  2. Normal left ventricular internal dimensions and wall  thicknesses.  3. Normal left ventricular systolic function. No segmental  wall motion abnormalities.  4. Normal left ventricular diastolic function.  5. Normal right ventricular size and function.  ------------------------------------------------------------------------  Confirmed on  1/19/2023 - 08:55:08 by Jorge Kim M.D.,  [facility], [facility]    < end of copied text >    < from: Cardiac Catheterization (04.07.22 @ 09:48) >  VENTRICLES: No left ventriculogram was performed.  CORONARY VESSELS: The coronary circulation is right dominant.  LM:   --  LM: Normal.  LAD:   --  LAD: Angiography showed minor luminal irregularities with no  flow limiting lesions.  CX:   --  Circumflex: Angiography showed minor luminal irregularities with  no flow limiting lesions.  RCA:   --  RCA: Angiography showed no evidence of disease.  COMPLICATIONS: Ventricular tachycardia occurred during the cath lab visit.  This complication required cardioversion.  DIAGNOSTIC IMPRESSIONS: There is mild irregularity of the coronary anatomy.  DIAGNOSTIC RECOMMENDATIONS: Medical management is recommended.  Prepared and signed by  Vinny Doyt MD  Signed 04/07/2022 10:41:32    < end of copied text >        Stress:  NUCLEAR FINDINGS:  The left ventricle was normal in size. There is a small,  mild defect in basal inferolateral wall that is  reversible, suggestive of ischemia.  ------------------------------------------------------------------------  GATED ANALYSIS:  Post-stress gated wall motion analysis was performed (LVEF  = 58 %;LVEDV = 72 ml.), revealing normal LV function.  ------------------------------------------------------------------------  IMPRESSIONS:Abnormal Study  * The left ventricle was normal in size. There is a small,  mild defect in basal inferolateral wall that is  reversible, suggestive of ischemia.  * Post-stress gated wall motion analysis was performed  (LVEF = 58 %;LVEDV = 72 ml.), revealing normal LV  function.      ASSESSMENT/PLAN: 57 year old male with PMH HTN, no obs CAD by Cath 4/2022, PAF on XArelto 4/2022, Asthma, gastric sleeve in 2015 and GERD presented to ER with complaints of chest pain x 1 day.  Pain is reproducible on exam, worse with movement of the chest and on exertion    1. Chest Pain  --ACS ruled out  --LHC <1 year ago without any CAD  --TTE with structurally normal heart, stress abnormal  - LHC on tomorrow , please check covid on today  and make NPO after midnight  - continue to hold xarelto, place on heparin gtt       2. DM  --per chart, resolved after weight loss, however A1c is 7- ENDO consulted, Dr Harris    3. HTN  --cont home Metoprolol    4. hx PAF  --per chart, PAF post cath in 4/2022, cont Xarelto

## 2023-01-22 NOTE — PROGRESS NOTE ADULT - SUBJECTIVE AND OBJECTIVE BOX
Patient is a 58y old  Male who presents with a chief complaint of chest pain (22 Jan 2023 10:15)    Date of servie : 01-22-23 @ 12:15  INTERVAL HPI/OVERNIGHT EVENTS:  T(C): 36.2 (01-22-23 @ 09:30), Max: 37 (01-22-23 @ 01:37)  HR: 77 (01-22-23 @ 09:30) (75 - 81)  BP: 102/62 (01-22-23 @ 09:30) (91/58 - 111/74)  RR: 18 (01-22-23 @ 09:30) (16 - 18)  SpO2: 100% (01-22-23 @ 09:30) (97% - 100%)  Wt(kg): --  I&O's Summary    21 Jan 2023 07:01  -  22 Jan 2023 07:00  --------------------------------------------------------  IN: 360 mL / OUT: 1300 mL / NET: -940 mL        LABS:                        10.6   5.16  )-----------( 284      ( 22 Jan 2023 06:55 )             35.4     01-22    137  |  102  |  7   ----------------------------<  91  3.6   |  26  |  0.54    Ca    8.4      22 Jan 2023 06:55  Phos  3.4     01-22  Mg     1.80     01-22      PT/INR - ( 22 Jan 2023 06:55 )   PT: 12.9 sec;   INR: 1.11 ratio         PTT - ( 22 Jan 2023 06:55 )  PTT:61.9 sec    CAPILLARY BLOOD GLUCOSE      POCT Blood Glucose.: 116 mg/dL (22 Jan 2023 11:49)  POCT Blood Glucose.: 132 mg/dL (22 Jan 2023 07:46)  POCT Blood Glucose.: 293 mg/dL (21 Jan 2023 21:33)  POCT Blood Glucose.: 124 mg/dL (21 Jan 2023 17:14)            MEDICATIONS  (STANDING):  aspirin enteric coated 81 milliGRAM(s) Oral daily  atorvastatin 80 milliGRAM(s) Oral at bedtime  dextrose 5%. 1000 milliLiter(s) (50 mL/Hr) IV Continuous <Continuous>  dextrose 5%. 1000 milliLiter(s) (100 mL/Hr) IV Continuous <Continuous>  dextrose 50% Injectable 25 Gram(s) IV Push once  dextrose 50% Injectable 12.5 Gram(s) IV Push once  dextrose 50% Injectable 25 Gram(s) IV Push once  gabapentin 200 milliGRAM(s) Oral two times a day  glucagon  Injectable 1 milliGRAM(s) IntraMuscular once  heparin  Infusion. 1200 Unit(s)/Hr (12 mL/Hr) IV Continuous <Continuous>  insulin lispro (ADMELOG) corrective regimen sliding scale   SubCutaneous three times a day before meals  insulin lispro (ADMELOG) corrective regimen sliding scale   SubCutaneous at bedtime  lidocaine   4% Patch 1 Patch Transdermal every 24 hours  magnesium oxide 400 milliGRAM(s) Oral three times a day with meals  pantoprazole    Tablet 40 milliGRAM(s) Oral two times a day  sucralfate 1 Gram(s) Oral four times a day  traZODone 50 milliGRAM(s) Oral daily    MEDICATIONS  (PRN):  acetaminophen     Tablet .. 650 milliGRAM(s) Oral every 6 hours PRN Temp greater or equal to 38C (100.4F), Mild Pain (1 - 3)  albuterol/ipratropium for Nebulization 3 milliLiter(s) Nebulizer every 6 hours PRN Shortness of Breath and/or Wheezing  aluminum hydroxide/magnesium hydroxide/simethicone Suspension 30 milliLiter(s) Oral every 6 hours PRN Dyspepsia  dextrose Oral Gel 15 Gram(s) Oral once PRN Blood Glucose LESS THAN 70 milliGRAM(s)/deciliter  ibuprofen  Tablet. 600 milliGRAM(s) Oral every 6 hours PRN Temp greater or equal to 38C (100.4F), Moderate Pain (4 - 6), Severe Pain (7 - 10)  melatonin 3 milliGRAM(s) Oral at bedtime PRN Insomnia          PHYSICAL EXAM:  GENERAL: NAD, well-groomed, well-developed  HEAD:  Atraumatic, Normocephalic  CHEST/LUNG: Clear to percussion bilaterally; No rales, rhonchi, wheezing, or rubs  HEART: Regular rate and rhythm; No murmurs, rubs, or gallops  ABDOMEN: Soft, Nontender, Nondistended; Bowel sounds present  EXTREMITIES:  2+ Peripheral Pulses, No clubbing, cyanosis, or edema  LYMPH: No lymphadenopathy noted  SKIN: No rashes or lesions    Care Discussed with Consultants/Other Providers [ ] YES  [ ] NO

## 2023-01-23 ENCOUNTER — TRANSCRIPTION ENCOUNTER (OUTPATIENT)
Age: 58
End: 2023-01-23

## 2023-01-23 VITALS
TEMPERATURE: 98 F | OXYGEN SATURATION: 100 % | RESPIRATION RATE: 18 BRPM | DIASTOLIC BLOOD PRESSURE: 69 MMHG | HEART RATE: 98 BPM | SYSTOLIC BLOOD PRESSURE: 118 MMHG

## 2023-01-23 LAB
ANION GAP SERPL CALC-SCNC: 11 MMOL/L — SIGNIFICANT CHANGE UP (ref 7–14)
APTT BLD: 32.9 SEC — SIGNIFICANT CHANGE UP (ref 27–36.3)
BUN SERPL-MCNC: 8 MG/DL — SIGNIFICANT CHANGE UP (ref 7–23)
CALCIUM SERPL-MCNC: 8.6 MG/DL — SIGNIFICANT CHANGE UP (ref 8.4–10.5)
CHLORIDE SERPL-SCNC: 101 MMOL/L — SIGNIFICANT CHANGE UP (ref 98–107)
CO2 SERPL-SCNC: 26 MMOL/L — SIGNIFICANT CHANGE UP (ref 22–31)
CREAT SERPL-MCNC: 0.65 MG/DL — SIGNIFICANT CHANGE UP (ref 0.5–1.3)
EGFR: 108 ML/MIN/1.73M2 — SIGNIFICANT CHANGE UP
GLUCOSE BLDC GLUCOMTR-MCNC: 100 MG/DL — HIGH (ref 70–99)
GLUCOSE BLDC GLUCOMTR-MCNC: 165 MG/DL — HIGH (ref 70–99)
GLUCOSE BLDC GLUCOMTR-MCNC: 91 MG/DL — SIGNIFICANT CHANGE UP (ref 70–99)
GLUCOSE BLDC GLUCOMTR-MCNC: 95 MG/DL — SIGNIFICANT CHANGE UP (ref 70–99)
GLUCOSE SERPL-MCNC: 90 MG/DL — SIGNIFICANT CHANGE UP (ref 70–99)
HCT VFR BLD CALC: 36.6 % — LOW (ref 39–50)
HGB BLD-MCNC: 10.7 G/DL — LOW (ref 13–17)
INR BLD: 1.12 RATIO — SIGNIFICANT CHANGE UP (ref 0.88–1.16)
MAGNESIUM SERPL-MCNC: 1.8 MG/DL — SIGNIFICANT CHANGE UP (ref 1.6–2.6)
MCHC RBC-ENTMCNC: 20.5 PG — LOW (ref 27–34)
MCHC RBC-ENTMCNC: 29.2 GM/DL — LOW (ref 32–36)
MCV RBC AUTO: 70 FL — LOW (ref 80–100)
NRBC # BLD: 0 /100 WBCS — SIGNIFICANT CHANGE UP (ref 0–0)
NRBC # FLD: 0 K/UL — SIGNIFICANT CHANGE UP (ref 0–0)
PHOSPHATE SERPL-MCNC: 3.5 MG/DL — SIGNIFICANT CHANGE UP (ref 2.5–4.5)
PLATELET # BLD AUTO: 282 K/UL — SIGNIFICANT CHANGE UP (ref 150–400)
POTASSIUM SERPL-MCNC: 4 MMOL/L — SIGNIFICANT CHANGE UP (ref 3.5–5.3)
POTASSIUM SERPL-SCNC: 4 MMOL/L — SIGNIFICANT CHANGE UP (ref 3.5–5.3)
PROTHROM AB SERPL-ACNC: 13 SEC — SIGNIFICANT CHANGE UP (ref 10.5–13.4)
RBC # BLD: 5.23 M/UL — SIGNIFICANT CHANGE UP (ref 4.2–5.8)
RBC # FLD: 15.5 % — HIGH (ref 10.3–14.5)
SODIUM SERPL-SCNC: 138 MMOL/L — SIGNIFICANT CHANGE UP (ref 135–145)
WBC # BLD: 5.05 K/UL — SIGNIFICANT CHANGE UP (ref 3.8–10.5)
WBC # FLD AUTO: 5.05 K/UL — SIGNIFICANT CHANGE UP (ref 3.8–10.5)

## 2023-01-23 RX ORDER — SODIUM CHLORIDE 9 MG/ML
1000 INJECTION INTRAMUSCULAR; INTRAVENOUS; SUBCUTANEOUS
Refills: 0 | Status: DISCONTINUED | OUTPATIENT
Start: 2023-01-23 | End: 2023-01-23

## 2023-01-23 RX ORDER — OMEPRAZOLE 10 MG/1
1 CAPSULE, DELAYED RELEASE ORAL
Qty: 0 | Refills: 0 | DISCHARGE

## 2023-01-23 RX ORDER — RIVAROXABAN 15 MG-20MG
1 KIT ORAL
Qty: 0 | Refills: 0 | DISCHARGE

## 2023-01-23 RX ORDER — ASPIRIN/CALCIUM CARB/MAGNESIUM 324 MG
1 TABLET ORAL
Qty: 30 | Refills: 0
Start: 2023-01-23 | End: 2023-02-21

## 2023-01-23 RX ORDER — ATORVASTATIN CALCIUM 80 MG/1
1 TABLET, FILM COATED ORAL
Qty: 30 | Refills: 0
Start: 2023-01-23 | End: 2023-02-21

## 2023-01-23 RX ORDER — IBUPROFEN 200 MG
1 TABLET ORAL
Qty: 8 | Refills: 0
Start: 2023-01-23 | End: 2023-01-24

## 2023-01-23 RX ORDER — SODIUM CHLORIDE 9 MG/ML
500 INJECTION INTRAMUSCULAR; INTRAVENOUS; SUBCUTANEOUS
Refills: 0 | Status: DISCONTINUED | OUTPATIENT
Start: 2023-01-23 | End: 2023-01-23

## 2023-01-23 RX ADMIN — LIDOCAINE 1 PATCH: 4 CREAM TOPICAL at 10:17

## 2023-01-23 RX ADMIN — Medication 1 GRAM(S): at 18:15

## 2023-01-23 RX ADMIN — Medication 81 MILLIGRAM(S): at 12:29

## 2023-01-23 RX ADMIN — GABAPENTIN 200 MILLIGRAM(S): 400 CAPSULE ORAL at 18:15

## 2023-01-23 RX ADMIN — PANTOPRAZOLE SODIUM 40 MILLIGRAM(S): 20 TABLET, DELAYED RELEASE ORAL at 18:15

## 2023-01-23 NOTE — DISCHARGE NOTE PROVIDER - NSDCMRMEDTOKEN_GEN_ALL_CORE_FT
aspirin 81 mg oral delayed release tablet: 1 tab(s) orally once a day  atorvastatin 80 mg oral tablet: 1 tab(s) orally once a day (at bedtime)  gabapentin 100 mg oral tablet: 200 milligram(s) orally 2 times a day  ibuprofen 600 mg oral tablet: 1 tab(s) orally every 6 hours, As needed, Temp greater or equal to 38C (100.4F), Moderate Pain (4 - 6), Severe Pain (7 - 10)  oxyCODONE 7.5 mg oral tablet: 1 tab(s) orally every 6 hours, As Needed  pantoprazole 40 mg oral delayed release tablet: 1 tab(s) orally once a day  sucralfate 1 g oral tablet: 1 tab(s) orally 4 times a day (before meals and at bedtime)  traZODone 50 mg oral tablet: 1 tab(s) orally once a day  Xarelto 20 mg oral tablet: 1 tab(s) orally once a day (in the evening) on 1/24/2023.

## 2023-01-23 NOTE — DISCHARGE NOTE NURSING/CASE MANAGEMENT/SOCIAL WORK - NSDCPEFALRISK_GEN_ALL_CORE
For information on Fall & Injury Prevention, visit: [email or web address] OR  [email or web address] OR  https://www.cdc.gov/steadi/patient.html

## 2023-01-23 NOTE — DISCHARGE NOTE PROVIDER - NSDCCPCAREPLAN_GEN_ALL_CORE_FT
PRINCIPAL DISCHARGE DIAGNOSIS  Diagnosis: Chest pain  Assessment and Plan of Treatment: - You were noted with chest pain and had a stress test and left heart catherization procedure performed in house. You were noted with minimal plaque build in your coronaries and no further work up was deemed to be necessary.   - Your chest pain was seen to be second to muscular pain and improved with Motrin. Please continue with your Motrin as ordered.   - Please continue on Aspirin and Lipitor as ordered   - Please follow up with Dr. Manzano outpatient       PRINCIPAL DISCHARGE DIAGNOSIS  Diagnosis: Chest pain  Assessment and Plan of Treatment: - You were noted with chest pain and had a stress test and left heart catherization procedure performed in house. You were noted with minimal plaque build in your coronaries and no further work up was deemed to be necessary.   - Your chest pain was seen to be second to muscular pain and improved with Motrin. Please continue with your Motrin as ordered.   - Please continue on Aspirin and Lipitor as ordered   - Please follow up with Dr. Manzano outpatient      SECONDARY DISCHARGE DIAGNOSES  Diagnosis: Chronic atrial fibrillation  Assessment and Plan of Treatment: - You were continued on Heparin Drip while in house in preparation for your procedure. Please resume your Xarelto on 1/24/2023.     PRINCIPAL DISCHARGE DIAGNOSIS  Diagnosis: Chest pain  Assessment and Plan of Treatment: - You were noted with chest pain and had a stress test and left heart catherization procedure performed in house. You were noted with minimal plaque build in your coronaries and no further work up was deemed to be necessary.   - Your chest pain was seen to be second to muscular pain and improved with Motrin. Please continue with your Motrin as ordered.   - Please continue on Aspirin and Lipitor as ordered   - Please follow up with Dr. Manzano outpatient in 1 week.      SECONDARY DISCHARGE DIAGNOSES  Diagnosis: Chronic atrial fibrillation  Assessment and Plan of Treatment: - You were continued on Heparin Drip while in house in preparation for your procedure. Please resume your Xarelto on 1/24/2023.

## 2023-01-23 NOTE — PROGRESS NOTE ADULT - PROBLEM SELECTOR PLAN 1
Will continue current insulin regimen for now. Will continue monitoring  blood sugars, will Follow up.  Patient does not want to start diabetes medications, wants to try low carb diet and exercise for 3 months FU  Patient counseled for compliance with consistent low carb diet.  .

## 2023-01-23 NOTE — PROGRESS NOTE ADULT - SUBJECTIVE AND OBJECTIVE BOX
EP     HISTORY OF PRESENT ILLNESS: HPI:  This is a 56 y/o M with pmhx of DM, CVA, Asthma, presented to the ED for new onset chest pain. The patient had been getting chest pain that is on and off with no particular trigger and pattern. Yesterday the patient was working about had sudden onset of chest pain with episode of diaphoresis and "feeling warm". The patient also endorsed that chest pain wound worsen with exertion and walking. The patient reports still having chest pain but it is "not as bad" as before. Denies shortness of breath. He had an angiogram done last year which he said was normal. No family hx of cardiac disease or prior hx of MI. Reported hx of DM but is not on any medications. He also gets occasional palpitations, no LOC.  The patient was in the CDU and was found to have a positive stress test showing inferior wall motion abnormalities. Admit for angiogram. (19 Jan 2023 22:52)    Brought back to hospital for abnormal stress test results, needs coronary angio.  He has a history of paroxysmal AFib, and takes Xarelto for secondary prevention of stroke. He has been compliant on this medication.  Reports no palpitations, syncope or near-syncope at home.  A 10 pt ROS is otherwise negative. EP called for assistance with anticoag management periop for coronary angiogram.  1/21- resting in bed. had palpation-reproducible chest pain earlier today. given NSAIDs.  anticoag on hold awaiting coronary angio this week.  1/22- no new complaints. awaiting coronary angio.  Date of service 1/23- pain is improved on motrin, no SOB or palps        Review of Systems:   Constitutional: [ ] fevers, [ ] chills.   Skin: [ ] dry skin. [ ] rashes.  Psychiatric: [ ] depression, [ ] anxiety.   Gastrointestinal: [ ] BRBPR, [ ] melena.   Neurological: [ ] confusion. [ ] seizures. [ ] shuffling gait.   Ears,Nose,Mouth and Throat: [ ] ear pain [ ] sore throat.   Eyes: [ ] diplopia.   Respiratory: [ ] hemoptysis. [ ] shortness of breath  Cardiovascular: See HPI above  Hematologic/Lymphatic: [ ] anemia. [ ] painful nodes. [ ] prolonged bleeding.   Genitourinary: [ ] hematuria. [ ] flank pain.   Endocrine: [ ] significant change in weight. [ ] intolerance to heat and cold.     Review of systems [ x] otherwise negative, [ ] otherwise unable to obtain    FH: no family history of sudden cardiac death in first degree relatives    SH: [ ] tobacco, [ ] alcohol, [ ] drugs    acetaminophen     Tablet .. 650 milliGRAM(s) Oral every 6 hours PRN  albuterol/ipratropium for Nebulization 3 milliLiter(s) Nebulizer every 6 hours PRN  aluminum hydroxide/magnesium hydroxide/simethicone Suspension 30 milliLiter(s) Oral every 6 hours PRN  aspirin enteric coated 81 milliGRAM(s) Oral daily  atorvastatin 80 milliGRAM(s) Oral at bedtime  gabapentin 200 milliGRAM(s) Oral two times a day  ibuprofen  Tablet. 600 milliGRAM(s) Oral every 6 hours PRN  insulin lispro (ADMELOG) corrective regimen sliding scale   SubCutaneous three times a day before meals  insulin lispro (ADMELOG) corrective regimen sliding scale   SubCutaneous at bedtime  lidocaine   4% Patch 1 Patch Transdermal every 24 hours  melatonin 3 milliGRAM(s) Oral at bedtime PRN  pantoprazole    Tablet 40 milliGRAM(s) Oral two times a day  sodium chloride 0.9%. 500 milliLiter(s) IV Continuous <Continuous>  sucralfate 1 Gram(s) Oral four times a day  traZODone 50 milliGRAM(s) Oral daily                            10.7   5.05  )-----------( 282      ( 23 Jan 2023 05:38 )             36.6       138  |  101  |  8   ----------------------------<  90  4.0   |  26  |  0.65    Ca    8.6      23 Jan 2023 05:38  Phos  3.5     01-23  Mg     1.80     01-23      CARDIAC MARKERS ( 21 Jan 2023 05:49 )  x     / x     / 56 U/L / x     / <1.0 ng/mL  CARDIAC MARKERS ( 20 Jan 2023 23:30 )  x     / x     / 55 U/L / x     / <1.0 ng/mL      T(C): 36.4 (01-23-23 @ 11:38), Max: 37.2 (01-22-23 @ 16:42)  HR: 71 (01-23-23 @ 11:38) (70 - 90)  BP: 94/59 (01-23-23 @ 11:38) (94/59 - 109/60)  RR: 18 (01-23-23 @ 11:38) (17 - 18)  SpO2: 100% (01-23-23 @ 11:38) (97% - 100%)    General: Well nourished, no acute distress, alert and oriented x 3  Head: normocephalic, no trauma  Neck: no JVD, no bruit, supple, not enlarged  CV: S1S2, no S3, regular rate, rhythm is SINUS, no murmurs.    Lungs: clear BL, no rales or wheezes  Abdomen: bowel sounds +, soft, nontender, nondistended  Extremities: no clubbing, cyanosis or edema  Neuro: Moves all 4 extremities, sensation intact x 4 extremities  Skin: warm and moist, normal turgor  Psych: Mood and affect are appropriate for circumstances  MSK: normal range of motion and strength x4 extremities.    TELEMETRY: NSR	    ECG: NSR 	  Echo:   < from: Transthoracic Echocardiogram (01.19.23 @ 07:48) >  DIMENSIONS:  Dimensions:     Normal Values:  LA:     3.3 cm    2.0 - 4.0 cm  Ao:     2.8 cm    2.0 - 3.8 cm  SEPTUM: 0.6 cm    0.6 - 1.2 cm  PWT:  0.6 cm    0.6 - 1.1 cm  LVIDd:  4.7 cm    3.0 - 5.6 cm  LVIDs:  3.0 cm    1.8 - 4.0 cm  Derived Variables:  LVMI: 48 g/m2  RWT: 0.25  Fractional short: 36 %  Ejection Fraction (Modified Jc Rule): 66 %  ------------------------------------------------------------------------  OBSERVATIONS:  Mitral Valve: Normal mitral valve. Mild mitral  regurgitation.  Aortic Root: Normal aortic root.  Aortic Valve: Normal trileaflet aortic valve. Minimal  aortic regurgitation.  Left Atrium: Normal left atrium.  LA volume index = 20  cc/m2.  Left Ventricle: Normal left ventricular systolic function.  No segmental wall motion abnormalities. Normal left  ventricular internal dimensions and wall thicknesses.  Normal left ventricular diastolic function.  Right Heart: Normal right atrium. Normal right ventricular  size and function. Normal tricuspid valve.  Minimal  tricuspid regurgitation. Normal pulmonic valve.  Pericardium/PleuraNormal pericardium with no pericardial  effusion.    < end of copied text >    NST:  < from: Nuclear Stress Test-Pharmacologic (01.19.23 @ 09:17) >  IMPRESSIONS:Abnormal Study  * The left ventricle was normal in size. There is a small,  mild defect in basal inferolateral wall that is  reversible, suggestive of ischemia.  * Post-stress gated wall motion analysis was performed  (LVEF = 58 %;LVEDV = 72 ml.), revealing normal LV  function.    < end of copied text >	    ASSESSMENT/PLAN: 	Mr Kraft is a 57y Male here with chest pain, and needing further workup after abnormal stress test.  He sees Dr Dakota Funk for general cardiology.  Xarelto is on hold (20mg daily) ahead of coronary angiogram.  [id number] is 4 (stroke, HTN, DM).  At this time no known hx of MI.  He has been compliant with this at home, so missing a few doses ahead of angiogram is low risk for allowing a CVA, and he does not require bridging.  Maintain telemetry.

## 2023-01-23 NOTE — CHART NOTE - NSCHARTNOTEFT_GEN_A_CORE
MICU PA SITE CHECK NOTE    Procedure s/p LHC via RRA     Patient is s/p CATH via RRA site. Dressing is clean, dry and intact. Site is without hematoma or bleeding. Patient with no TTP. Sensation and CHARLEY intact. Distal pulses palpable 2+ and capillary refill < 2 seconds. Patient denies numbness, tingling, CP or SOB. Will continue to monitor.     Vital Signs Last 24 Hrs  T(C): 36.4 (23 Jan 2023 15:30), Max: 37.2 (22 Jan 2023 20:45)  T(F): 97.6 (23 Jan 2023 15:30), Max: 98.9 (22 Jan 2023 20:45)  HR: 98 (23 Jan 2023 15:30) (70 - 98)  BP: 118/69 (23 Jan 2023 15:30) (94/59 - 118/69)  BP(mean): --  RR: 18 (23 Jan 2023 15:30) (18 - 18)  SpO2: 100% (23 Jan 2023 15:30) (98% - 100%)    Parameters below as of 23 Jan 2023 15:30  Patient On (Oxygen Delivery Method): room air    Terrance Baldwin PA-C  Department of Medicine/ [facility]  In house Beeper #[id number]  Reachable via teams

## 2023-01-23 NOTE — PROGRESS NOTE ADULT - ASSESSMENT
58 y/o M with pmhx of DM, CVA, Asthma, presented to the ED for new onset chest pain. Patient found to have an abnormal stress test concerning for CAD. Admit for CAD and ACS work up.    Problem/Plan - 1:  ·  Problem: Unstable angina pectoris.   ·  Plan: Assessment:  - patient presented for chest pain  - cath pending       Problem/Plan - 2:  ·  Problem: DM2 (diabetes mellitus, type 2).   ·  Plan: - low dose sliding scale insulin for now  monitor FS    Problem/Plan - 3:  ·  Problem: Chronic atrial fibrillation.  ·  Plan: - patient had a hx of afib in the past  holding ac for cath     Problem/Plan - 4:  ·  Problem: Asthma.  ·  Plan: - monitor for now, no respiratory distress.    Problem/Plan - 5:  ·  Problem: Prophylactic measure.   ·  Plan: - heparin subq for dvt ppx.  
56 y/o M with pmhx of DM, CVA, Asthma, presented to the ED for new onset chest pain. Patient found to have an abnormal stress test concerning for CAD. Admit for CAD and ACS work up.    Problem/Plan - 1:  ·  Problem: Unstable angina pectoris.   ·  Plan: Assessment:  - patient presented for chest pain  - cath pending       Problem/Plan - 2:  ·  Problem: DM2 (diabetes mellitus, type 2).   ·  Plan: - low dose sliding scale insulin for now  monitor FS    Problem/Plan - 3:  ·  Problem: Chronic atrial fibrillation.  ·  Plan: - patient had a hx of afib in the past  holding ac for cath     Problem/Plan - 4:  ·  Problem: Asthma.  ·  Plan: - monitor for now, no respiratory distress.    Problem/Plan - 5:  ·  Problem: Prophylactic measure.   ·  Plan: - heparin subq for dvt ppx.    
58 y/o M with pmhx of DM, CVA, Asthma, presented to the ED for new onset chest pain. Patient found to have an abnormal stress test concerning for CAD. Admit for CAD and ACS work up.    Problem/Plan - 1:  ·  Problem: Unstable angina pectoris.   ·  Plan: Assessment:  - patient presented for chest pain  - cath pending       Problem/Plan - 2:  ·  Problem: DM2 (diabetes mellitus, type 2).   ·  Plan: - low dose sliding scale insulin for now  monitor FS    Problem/Plan - 3:  ·  Problem: Chronic atrial fibrillation.  ·  Plan: - patient had a hx of afib in the past  holding ac for cath     Problem/Plan - 4:  ·  Problem: Asthma.  ·  Plan: - monitor for now, no respiratory distress.    Problem/Plan - 5:  ·  Problem: Prophylactic measure.   ·  Plan: - heparin subq for dvt ppx.    
58 y/o M with pmhx of DM, CVA, Asthma, presented to the ED for new onset chest pain. Patient found to have an abnormal stress test concerning for CAD. Admit for CAD and ACS work up.    Problem/Plan - 1:  ·  Problem: Unstable angina pectoris.   ·  Plan: Assessment:  - patient presented for chest pain  - cath pending       Problem/Plan - 2:  ·  Problem: DM2 (diabetes mellitus, type 2).   ·  Plan: - low dose sliding scale insulin for now  monitor FS    Problem/Plan - 3:  ·  Problem: Chronic atrial fibrillation.  ·  Plan: - patient had a hx of afib in the past  - NSR on EKG  - hold xarelto for now pending cath    Problem/Plan - 4:  ·  Problem: Asthma.  ·  Plan: - monitor for now, no respiratory distress.    Problem/Plan - 5:  ·  Problem: Prophylactic measure.   ·  Plan: - heparin subq for dvt ppx.  
  Assessment  DMT2: 58y Male with DM T2 with hyperglycemia admitted with chest pain,  A1C is 7.1%, patient was not on any hypoglycemic agents at home, now on insulin coverage, blood sugars trending down, no hypoglycemic episode,  eating meals, compliant with low carb diet.  Patient is high risk with high level decision making due to uncontrolled diabetes, has increased risk for complications.  Chest pain: Being worked up, on medications, monitored, asymptomatic.  HTN: On antihypertensive medications, monitored, asymptomatic.            Cesar Harris MD  Cell:  [phone]  Office: [phone]

## 2023-01-23 NOTE — CHART NOTE - NSCHARTNOTEFT_GEN_A_CORE
Cath with no obstructive CAD  Chest pain is probably musculoskeletal.  DC home later today  Resume Xarelto tomorrow.  Advise patient to take Motrin 400mg PO BID x 3 days.  Follow up with Dr Iglesias 1/20, Monday, at 2PM    Miranda SKINNER  [phone]

## 2023-01-23 NOTE — DISCHARGE NOTE PROVIDER - CARE PROVIDER_API CALL
Yarelis Manzano)  Internal Medicine  [address] [facility], [place] [address]  Phone: [phone]  Fax: [phone]  Follow Up Time:

## 2023-01-23 NOTE — PROGRESS NOTE ADULT - PROVIDER SPECIALTY LIST ADULT
Cardiology
Hospitalist
Cardiology
Cardiology
Electrophysiology
Hospitalist
Cardiology
Hospitalist
Hospitalist
Endocrinology

## 2023-01-23 NOTE — PROGRESS NOTE ADULT - SUBJECTIVE AND OBJECTIVE BOX
Date of service 01/28/2023    chief complaint: Chest pain    extended hpi: 57 year old male with PMH HTN, no obs CAD by Cath 4/2022, PAF on Xarelto, Asthma, gastric sleeve in 2015 and GERD presented to ER with complaints of chest pain x 1 day.  Pain is reproducible on exam, worse with movement of the chest and on exertion.  Denies any associate sx such as SOB, diaphoresis, N/V, palps or syncope.  Denies radiation of pain.  Denies heavy lifting.        Patient denies  shortness of breath.  Reports some improvement in chest pain after Motrin. Review of symptoms otherwise negative.    MEDICATIONS:  acetaminophen     Tablet .. 650 milliGRAM(s) Oral every 6 hours PRN  albuterol/ipratropium for Nebulization 3 milliLiter(s) Nebulizer every 6 hours PRN  aluminum hydroxide/magnesium hydroxide/simethicone Suspension 30 milliLiter(s) Oral every 6 hours PRN  aspirin enteric coated 81 milliGRAM(s) Oral daily  atorvastatin 80 milliGRAM(s) Oral at bedtime  dextrose 5%. 1000 milliLiter(s) IV Continuous <Continuous>  dextrose 5%. 1000 milliLiter(s) IV Continuous <Continuous>  dextrose 50% Injectable 25 Gram(s) IV Push once  dextrose 50% Injectable 12.5 Gram(s) IV Push once  dextrose 50% Injectable 25 Gram(s) IV Push once  dextrose Oral Gel 15 Gram(s) Oral once PRN  gabapentin 200 milliGRAM(s) Oral two times a day  glucagon  Injectable 1 milliGRAM(s) IntraMuscular once  ibuprofen  Tablet. 600 milliGRAM(s) Oral every 6 hours PRN  insulin lispro (ADMELOG) corrective regimen sliding scale   SubCutaneous three times a day before meals  insulin lispro (ADMELOG) corrective regimen sliding scale   SubCutaneous at bedtime  lidocaine   4% Patch 1 Patch Transdermal every 24 hours  melatonin 3 milliGRAM(s) Oral at bedtime PRN  pantoprazole    Tablet 40 milliGRAM(s) Oral two times a day  sucralfate 1 Gram(s) Oral four times a day  traZODone 50 milliGRAM(s) Oral daily      LABS:                        10.7   5.05  )-----------( 282      ( 23 Jan 2023 05:38 )             36.6       Hemoglobin: 10.7 g/dL (01-23 @ 05:38)  Hemoglobin: 10.6 g/dL (01-22 @ 06:55)  Hemoglobin: 10.1 g/dL (01-21 @ 22:40)  Hemoglobin: 11.2 g/dL (01-21 @ 05:49)  Hemoglobin: 11.0 g/dL (01-20 @ 09:25)      01-23    138  |  101  |  8   ----------------------------<  90  4.0   |  26  |  0.65    Ca    8.6      23 Jan 2023 05:38  Phos  3.5     01-23  Mg     1.80     01-23      Creatinine Trend: 0.65<--, 0.54<--, 0.60<--, 0.59<--, 0.64<--    COAGS: PT/INR - ( 23 Jan 2023 05:38 )   PT: 13.0 sec;   INR: 1.12 ratio         PTT - ( 23 Jan 2023 05:38 )  PTT:32.9 sec    CARDIAC MARKERS ( 21 Jan 2023 05:49 )  x     / x     / 56 U/L / x     / <1.0 ng/mL  CARDIAC MARKERS ( 20 Jan 2023 23:30 )  x     / x     / 55 U/L / x     / <1.0 ng/mL        PHYSICAL EXAM:  T(C): 37.1 (01-23-23 @ 08:45), Max: 37.2 (01-22-23 @ 16:42)  HR: 71 (01-23-23 @ 08:45) (70 - 90)  BP: 96/51 (01-23-23 @ 08:45) (96/51 - 109/60)  RR: 18 (01-23-23 @ 08:45) (17 - 18)  SpO2: 100% (01-23-23 @ 08:45) (97% - 100%)  Wt(kg): --    I&O's Summary    22 Jan 2023 07:01  -  23 Jan 2023 07:00  --------------------------------------------------------  IN: 0 mL / OUT: 800 mL / NET: -800 mL        General: Well nourished in no acute distress. Alert and Oriented * 3.   Head: Normocephalic and atraumatic.   Neck: No JVD. No bruits. Supple. Does not appear to be enlarged.   Cardiovascular: + S1,S2 ; RRR Soft systolic murmur at the left lower sternal border. No rubs noted.    Lungs: CTA b/l. No rhonchi, rales or wheezes.   Abdomen: + BS, soft. Non tender. Non distended. No rebound. No guarding.   Extremities: No clubbing/cyanosis/edema.   Neurologic: Moves all four extremities. Full range of motion.   Skin: Warm and moist. The patient's skin has normal elasticity and good skin turgor.   Psychiatric: Appropriate mood and affect.  Musculoskeletal: Normal range of motion, normal strength        ECG:  	NSR    < from: Transthoracic Echocardiogram (01.19.23 @ 07:48) >  CONCLUSIONS:  1. Normal mitral valve. Mild mitral regurgitation.  2. Normal left ventricular internal dimensions and wall  thicknesses.  3. Normal left ventricular systolic function. No segmental  wall motion abnormalities.  4. Normal left ventricular diastolic function.  5. Normal right ventricular size and function.  ------------------------------------------------------------------------  Confirmed on  1/19/2023 - 08:55:08 by Jorge Kim M.D.,  [facility], [facility]    < end of copied text >    < from: Cardiac Catheterization (04.07.22 @ 09:48) >  VENTRICLES: No left ventriculogram was performed.  CORONARY VESSELS: The coronary circulation is right dominant.  LM:   --  LM: Normal.  LAD:   --  LAD: Angiography showed minor luminal irregularities with no  flow limiting lesions.  CX:   --  Circumflex: Angiography showed minor luminal irregularities with  no flow limiting lesions.  RCA:   --  RCA: Angiography showed no evidence of disease.  COMPLICATIONS: Ventricular tachycardia occurred during the cath lab visit.  This complication required cardioversion.  DIAGNOSTIC IMPRESSIONS: There is mild irregularity of the coronary anatomy.  DIAGNOSTIC RECOMMENDATIONS: Medical management is recommended.  Prepared and signed by  Vinny Doty MD  Signed 04/07/2022 10:41:32    < end of copied text >        Stress:  NUCLEAR FINDINGS:  The left ventricle was normal in size. There is a small,  mild defect in basal inferolateral wall that is  reversible, suggestive of ischemia.  ------------------------------------------------------------------------  GATED ANALYSIS:  Post-stress gated wall motion analysis was performed (LVEF  = 58 %;LVEDV = 72 ml.), revealing normal LV function.  ------------------------------------------------------------------------  IMPRESSIONS:Abnormal Study  * The left ventricle was normal in size. There is a small,  mild defect in basal inferolateral wall that is  reversible, suggestive of ischemia.  * Post-stress gated wall motion analysis was performed  (LVEF = 58 %;LVEDV = 72 ml.), revealing normal LV  function.      ASSESSMENT/PLAN: 57 year old male with PMH HTN, no obs CAD by Cath 4/2022, PAF on XArelto 4/2022, Asthma, gastric sleeve in 2015 and GERD presented to ER with complaints of chest pain x 1 day.  Pain is reproducible on exam, worse with movement of the chest and on exertion    1. Chest Pain  --ACS ruled out  --LHC <1 year ago without any CAD  --TTE with structurally normal heart, stress abnormal  - LHC on today    2. DM  --per chart, resolved after weight loss, however A1c is 7- ENDO consulted, Dr Harris    3. HTN  --cont home Metoprolol    4. hx PAF  --per chart, PAF post cath in 4/2022, cont Xarelto    Yarelis Manzano MD  Pager: [phone]

## 2023-01-23 NOTE — PROGRESS NOTE ADULT - REASON FOR ADMISSION
chest pain

## 2023-01-23 NOTE — DISCHARGE NOTE NURSING/CASE MANAGEMENT/SOCIAL WORK - PATIENT PORTAL LINK FT
You can access the FollowMyHealth Patient Portal offered by [facility] by registering at the following website: [email or web address]. By joining [employer]’s FollowMyHealth portal, you will also be able to view your health information using other applications (apps) compatible with our system.

## 2023-01-23 NOTE — PROGRESS NOTE ADULT - SUBJECTIVE AND OBJECTIVE BOX
Patient is a 58y old  Male who presents with a chief complaint of chest pain (23 Jan 2023 14:54)    Date of servie : 01-23-23 @ 16:56  INTERVAL HPI/OVERNIGHT EVENTS:  T(C): 36.4 (01-23-23 @ 15:30), Max: 37.2 (01-22-23 @ 20:45)  HR: 98 (01-23-23 @ 15:30) (70 - 98)  BP: 118/69 (01-23-23 @ 15:30) (94/59 - 118/69)  RR: 18 (01-23-23 @ 15:30) (18 - 18)  SpO2: 100% (01-23-23 @ 15:30) (98% - 100%)  Wt(kg): --  I&O's Summary    22 Jan 2023 07:01  -  23 Jan 2023 07:00  --------------------------------------------------------  IN: 0 mL / OUT: 800 mL / NET: -800 mL        LABS:                        10.7   5.05  )-----------( 282      ( 23 Jan 2023 05:38 )             36.6     01-23    138  |  101  |  8   ----------------------------<  90  4.0   |  26  |  0.65    Ca    8.6      23 Jan 2023 05:38  Phos  3.5     01-23  Mg     1.80     01-23      PT/INR - ( 23 Jan 2023 05:38 )   PT: 13.0 sec;   INR: 1.12 ratio         PTT - ( 23 Jan 2023 05:38 )  PTT:32.9 sec    CAPILLARY BLOOD GLUCOSE      POCT Blood Glucose.: 100 mg/dL (23 Jan 2023 11:36)  POCT Blood Glucose.: 91 mg/dL (23 Jan 2023 07:21)  POCT Blood Glucose.: 95 mg/dL (23 Jan 2023 00:25)  POCT Blood Glucose.: 189 mg/dL (22 Jan 2023 21:33)  POCT Blood Glucose.: 168 mg/dL (22 Jan 2023 17:07)            MEDICATIONS  (STANDING):  aspirin enteric coated 81 milliGRAM(s) Oral daily  atorvastatin 80 milliGRAM(s) Oral at bedtime  dextrose 5%. 1000 milliLiter(s) (100 mL/Hr) IV Continuous <Continuous>  dextrose 5%. 1000 milliLiter(s) (50 mL/Hr) IV Continuous <Continuous>  dextrose 50% Injectable 25 Gram(s) IV Push once  dextrose 50% Injectable 12.5 Gram(s) IV Push once  dextrose 50% Injectable 25 Gram(s) IV Push once  gabapentin 200 milliGRAM(s) Oral two times a day  glucagon  Injectable 1 milliGRAM(s) IntraMuscular once  insulin lispro (ADMELOG) corrective regimen sliding scale   SubCutaneous three times a day before meals  insulin lispro (ADMELOG) corrective regimen sliding scale   SubCutaneous at bedtime  lidocaine   4% Patch 1 Patch Transdermal every 24 hours  pantoprazole    Tablet 40 milliGRAM(s) Oral two times a day  sodium chloride 0.9%. 500 milliLiter(s) (75 mL/Hr) IV Continuous <Continuous>  sodium chloride 0.9%. 1000 milliLiter(s) (175 mL/Hr) IV Continuous <Continuous>  sucralfate 1 Gram(s) Oral four times a day  traZODone 50 milliGRAM(s) Oral daily    MEDICATIONS  (PRN):  acetaminophen     Tablet .. 650 milliGRAM(s) Oral every 6 hours PRN Temp greater or equal to 38C (100.4F), Mild Pain (1 - 3)  albuterol/ipratropium for Nebulization 3 milliLiter(s) Nebulizer every 6 hours PRN Shortness of Breath and/or Wheezing  aluminum hydroxide/magnesium hydroxide/simethicone Suspension 30 milliLiter(s) Oral every 6 hours PRN Dyspepsia  dextrose Oral Gel 15 Gram(s) Oral once PRN Blood Glucose LESS THAN 70 milliGRAM(s)/deciliter  ibuprofen  Tablet. 600 milliGRAM(s) Oral every 6 hours PRN Temp greater or equal to 38C (100.4F), Moderate Pain (4 - 6), Severe Pain (7 - 10)  melatonin 3 milliGRAM(s) Oral at bedtime PRN Insomnia          PHYSICAL EXAM:  GENERAL: NAD, well-groomed, well-developed  HEAD:  Atraumatic, Normocephalic  CHEST/LUNG: Clear to percussion bilaterally; No rales, rhonchi, wheezing, or rubs  HEART: Regular rate and rhythm; No murmurs, rubs, or gallops  ABDOMEN: Soft, Nontender, Nondistended; Bowel sounds present  EXTREMITIES:  2+ Peripheral Pulses, No clubbing, cyanosis, or edema  LYMPH: No lymphadenopathy noted  SKIN: No rashes or lesions    Care Discussed with Consultants/Other Providers [ ] YES  [ ] NO

## 2023-01-23 NOTE — PROGRESS NOTE ADULT - SUBJECTIVE AND OBJECTIVE BOX
Chief complaint    Patient is a 58y old  Male who presents with a chief complaint of chest pain (23 Jan 2023 12:23)   Review of systems  Patient in bed, appears comfortable.    Labs and Fingersticks  CAPILLARY BLOOD GLUCOSE      POCT Blood Glucose.: 100 mg/dL (23 Jan 2023 11:36)  POCT Blood Glucose.: 91 mg/dL (23 Jan 2023 07:21)  POCT Blood Glucose.: 95 mg/dL (23 Jan 2023 00:25)  POCT Blood Glucose.: 189 mg/dL (22 Jan 2023 21:33)  POCT Blood Glucose.: 168 mg/dL (22 Jan 2023 17:07)      Anion Gap, Serum: 11 (01-23 @ 05:38)  Anion Gap, Serum: 9 (01-22 @ 06:55)      Calcium, Total Serum: 8.6 (01-23 @ 05:38)  Calcium, Total Serum: 8.4 (01-22 @ 06:55)          01-23    138  |  101  |  8   ----------------------------<  90  4.0   |  26  |  0.65    Ca    8.6      23 Jan 2023 05:38  Phos  3.5     01-23  Mg     1.80     01-23                          10.7   5.05  )-----------( 282      ( 23 Jan 2023 05:38 )             36.6     Medications  MEDICATIONS  (STANDING):  aspirin enteric coated 81 milliGRAM(s) Oral daily  atorvastatin 80 milliGRAM(s) Oral at bedtime  dextrose 5%. 1000 milliLiter(s) (100 mL/Hr) IV Continuous <Continuous>  dextrose 5%. 1000 milliLiter(s) (50 mL/Hr) IV Continuous <Continuous>  dextrose 50% Injectable 25 Gram(s) IV Push once  dextrose 50% Injectable 12.5 Gram(s) IV Push once  dextrose 50% Injectable 25 Gram(s) IV Push once  gabapentin 200 milliGRAM(s) Oral two times a day  glucagon  Injectable 1 milliGRAM(s) IntraMuscular once  insulin lispro (ADMELOG) corrective regimen sliding scale   SubCutaneous three times a day before meals  insulin lispro (ADMELOG) corrective regimen sliding scale   SubCutaneous at bedtime  lidocaine   4% Patch 1 Patch Transdermal every 24 hours  pantoprazole    Tablet 40 milliGRAM(s) Oral two times a day  sodium chloride 0.9%. 500 milliLiter(s) (75 mL/Hr) IV Continuous <Continuous>  sodium chloride 0.9%. 1000 milliLiter(s) (175 mL/Hr) IV Continuous <Continuous>  sucralfate 1 Gram(s) Oral four times a day  traZODone 50 milliGRAM(s) Oral daily      Physical Exam  General: Patient appears comfortable.  Vital Signs Last 12 Hrs  T(F): 97.6 (01-23-23 @ 11:38), Max: 98.7 (01-23-23 @ 08:45)  HR: 71 (01-23-23 @ 11:38) (70 - 71)  BP: 94/59 (01-23-23 @ 11:38) (94/59 - 107/63)  BP(mean): --  RR: 18 (01-23-23 @ 11:38) (18 - 18)  SpO2: 100% (01-23-23 @ 11:38) (98% - 100%)  Neck: No palpable thyroid nodules.  CVS: S1S2, No murmurs  Respiratory: No wheezing, no crepitations  GI: Abdomen soft, non tender.  Musculoskeletal:  edema lower extremities.     Diagnostics

## 2023-01-23 NOTE — DISCHARGE NOTE PROVIDER - HOSPITAL COURSE
Mr. Kraft is a 56 YO Male with PMH HTN, Non-Obstructive CAD (Cath 4/2022), PAF on Xarelto, Asthma, Gastric Sleeve (2015) and GERD who presented with complaints of chest pain. Troponins negative and ECG with no acute findings. Patient admitted to medicine/ cardiology (1/19).     On Medicine, case discussed with Cardiology and NST (1/19) with LVEF 58 and small mild reversible defect in the basal inferolateral wall. s/p LHC via RRA (1/23) with LM no disease and LAD, CX and RCA with minor irregularities. Patient also noted with mid-sternum chest pain relieved by lidoderm and motrin.     Case discussed with Dr. Manzano on 1/23 and patient is medically cleared and stable for discharge home.

## 2023-03-07 ENCOUNTER — EMERGENCY (EMERGENCY)
Facility: HOSPITAL | Age: 58
LOS: 1 days | Discharge: HOME | End: 2023-03-07
Attending: EMERGENCY MEDICINE | Admitting: EMERGENCY MEDICINE
Payer: COMMERCIAL

## 2023-03-07 VITALS
OXYGEN SATURATION: 100 % | DIASTOLIC BLOOD PRESSURE: 62 MMHG | SYSTOLIC BLOOD PRESSURE: 101 MMHG | TEMPERATURE: 99 F | HEART RATE: 67 BPM | RESPIRATION RATE: 18 BRPM

## 2023-03-07 VITALS
DIASTOLIC BLOOD PRESSURE: 61 MMHG | HEART RATE: 73 BPM | SYSTOLIC BLOOD PRESSURE: 93 MMHG | OXYGEN SATURATION: 99 % | RESPIRATION RATE: 18 BRPM | TEMPERATURE: 97 F

## 2023-03-07 DIAGNOSIS — Z90.49 ACQUIRED ABSENCE OF OTHER SPECIFIED PARTS OF DIGESTIVE TRACT: Chronic | ICD-10-CM

## 2023-03-07 DIAGNOSIS — Z98.84 BARIATRIC SURGERY STATUS: Chronic | ICD-10-CM

## 2023-03-07 DIAGNOSIS — Z98.89 OTHER SPECIFIED POSTPROCEDURAL STATES: Chronic | ICD-10-CM

## 2023-03-07 DIAGNOSIS — Z98.890 OTHER SPECIFIED POSTPROCEDURAL STATES: Chronic | ICD-10-CM

## 2023-03-07 DIAGNOSIS — Z90.89 ACQUIRED ABSENCE OF OTHER ORGANS: Chronic | ICD-10-CM

## 2023-03-07 LAB
ALBUMIN SERPL ELPH-MCNC: 3.1 G/DL — LOW (ref 3.3–5)
ALP SERPL-CCNC: 87 U/L — SIGNIFICANT CHANGE UP (ref 40–120)
ALT FLD-CCNC: 27 U/L — SIGNIFICANT CHANGE UP (ref 4–41)
ANION GAP SERPL CALC-SCNC: 5 MMOL/L — LOW (ref 7–14)
ANISOCYTOSIS BLD QL: SLIGHT — SIGNIFICANT CHANGE UP
AST SERPL-CCNC: 29 U/L — SIGNIFICANT CHANGE UP (ref 4–40)
B PERT DNA SPEC QL NAA+PROBE: SIGNIFICANT CHANGE UP
B PERT+PARAPERT DNA PNL SPEC NAA+PROBE: SIGNIFICANT CHANGE UP
BASE EXCESS BLDV CALC-SCNC: 4.7 MMOL/L — HIGH (ref -2–3)
BASOPHILS # BLD AUTO: 0 K/UL — SIGNIFICANT CHANGE UP (ref 0–0.2)
BASOPHILS NFR BLD AUTO: 0 % — SIGNIFICANT CHANGE UP (ref 0–2)
BILIRUB SERPL-MCNC: 0.3 MG/DL — SIGNIFICANT CHANGE UP (ref 0.2–1.2)
BLOOD GAS VENOUS COMPREHENSIVE RESULT: SIGNIFICANT CHANGE UP
BORDETELLA PARAPERTUSSIS (RAPRVP): SIGNIFICANT CHANGE UP
BUN SERPL-MCNC: 10 MG/DL — SIGNIFICANT CHANGE UP (ref 7–23)
C PNEUM DNA SPEC QL NAA+PROBE: SIGNIFICANT CHANGE UP
CALCIUM SERPL-MCNC: 8.1 MG/DL — LOW (ref 8.4–10.5)
CHLORIDE BLDV-SCNC: 103 MMOL/L — SIGNIFICANT CHANGE UP (ref 96–108)
CHLORIDE SERPL-SCNC: 105 MMOL/L — SIGNIFICANT CHANGE UP (ref 98–107)
CO2 BLDV-SCNC: 33.4 MMOL/L — HIGH (ref 22–26)
CO2 SERPL-SCNC: 28 MMOL/L — SIGNIFICANT CHANGE UP (ref 22–31)
CREAT SERPL-MCNC: 0.61 MG/DL — SIGNIFICANT CHANGE UP (ref 0.5–1.3)
EGFR: 110 ML/MIN/1.73M2 — SIGNIFICANT CHANGE UP
EOSINOPHIL # BLD AUTO: 0.1 K/UL — SIGNIFICANT CHANGE UP (ref 0–0.5)
EOSINOPHIL NFR BLD AUTO: 3 % — SIGNIFICANT CHANGE UP (ref 0–6)
FLUAV SUBTYP SPEC NAA+PROBE: SIGNIFICANT CHANGE UP
FLUBV RNA SPEC QL NAA+PROBE: SIGNIFICANT CHANGE UP
GAS PNL BLDV: 136 MMOL/L — SIGNIFICANT CHANGE UP (ref 136–145)
GAS PNL BLDV: SIGNIFICANT CHANGE UP
GLUCOSE BLDV-MCNC: 121 MG/DL — HIGH (ref 70–99)
GLUCOSE SERPL-MCNC: 122 MG/DL — HIGH (ref 70–99)
HADV DNA SPEC QL NAA+PROBE: SIGNIFICANT CHANGE UP
HCO3 BLDV-SCNC: 32 MMOL/L — HIGH (ref 22–29)
HCOV 229E RNA SPEC QL NAA+PROBE: SIGNIFICANT CHANGE UP
HCOV HKU1 RNA SPEC QL NAA+PROBE: SIGNIFICANT CHANGE UP
HCOV NL63 RNA SPEC QL NAA+PROBE: SIGNIFICANT CHANGE UP
HCOV OC43 RNA SPEC QL NAA+PROBE: SIGNIFICANT CHANGE UP
HCT VFR BLD CALC: 31.9 % — LOW (ref 39–50)
HCT VFR BLDA CALC: 27 % — LOW (ref 39–51)
HGB BLD CALC-MCNC: 9.1 G/DL — LOW (ref 12.6–17.4)
HGB BLD-MCNC: 9 G/DL — LOW (ref 13–17)
HMPV RNA SPEC QL NAA+PROBE: SIGNIFICANT CHANGE UP
HPIV1 RNA SPEC QL NAA+PROBE: SIGNIFICANT CHANGE UP
HPIV2 RNA SPEC QL NAA+PROBE: SIGNIFICANT CHANGE UP
HPIV3 RNA SPEC QL NAA+PROBE: SIGNIFICANT CHANGE UP
HPIV4 RNA SPEC QL NAA+PROBE: SIGNIFICANT CHANGE UP
HYPOCHROMIA BLD QL: SLIGHT — SIGNIFICANT CHANGE UP
IANC: 1.57 K/UL — LOW (ref 1.8–7.4)
LACTATE BLDV-MCNC: 0.7 MMOL/L — SIGNIFICANT CHANGE UP (ref 0.5–2)
LIDOCAIN IGE QN: 58 U/L — SIGNIFICANT CHANGE UP (ref 7–60)
LYMPHOCYTES # BLD AUTO: 1.04 K/UL — SIGNIFICANT CHANGE UP (ref 1–3.3)
LYMPHOCYTES # BLD AUTO: 30 % — SIGNIFICANT CHANGE UP (ref 13–44)
M PNEUMO DNA SPEC QL NAA+PROBE: SIGNIFICANT CHANGE UP
MANUAL SMEAR VERIFICATION: SIGNIFICANT CHANGE UP
MCHC RBC-ENTMCNC: 20.1 PG — LOW (ref 27–34)
MCHC RBC-ENTMCNC: 28.2 GM/DL — LOW (ref 32–36)
MCV RBC AUTO: 71.4 FL — LOW (ref 80–100)
MICROCYTES BLD QL: SLIGHT — SIGNIFICANT CHANGE UP
MONOCYTES # BLD AUTO: 0.83 K/UL — SIGNIFICANT CHANGE UP (ref 0–0.9)
MONOCYTES NFR BLD AUTO: 24 % — HIGH (ref 2–14)
NEUTROPHILS # BLD AUTO: 1.49 K/UL — LOW (ref 1.8–7.4)
NEUTROPHILS NFR BLD AUTO: 41 % — LOW (ref 43–77)
NEUTS BAND # BLD: 2 % — SIGNIFICANT CHANGE UP (ref 0–6)
NRBC # BLD: 0 /100 — SIGNIFICANT CHANGE UP (ref 0–0)
PCO2 BLDV: 60 MMHG — HIGH (ref 42–55)
PH BLDV: 7.33 — SIGNIFICANT CHANGE UP (ref 7.32–7.43)
PLAT MORPH BLD: NORMAL — SIGNIFICANT CHANGE UP
PLATELET # BLD AUTO: 195 K/UL — SIGNIFICANT CHANGE UP (ref 150–400)
PLATELET COUNT - ESTIMATE: NORMAL — SIGNIFICANT CHANGE UP
PO2 BLDV: 24 MMHG — LOW (ref 25–45)
POTASSIUM BLDV-SCNC: 3.7 MMOL/L — SIGNIFICANT CHANGE UP (ref 3.5–5.1)
POTASSIUM SERPL-MCNC: 3.7 MMOL/L — SIGNIFICANT CHANGE UP (ref 3.5–5.3)
POTASSIUM SERPL-SCNC: 3.7 MMOL/L — SIGNIFICANT CHANGE UP (ref 3.5–5.3)
PROT SERPL-MCNC: 6.7 G/DL — SIGNIFICANT CHANGE UP (ref 6–8.3)
RAPID RVP RESULT: SIGNIFICANT CHANGE UP
RBC # BLD: 4.47 M/UL — SIGNIFICANT CHANGE UP (ref 4.2–5.8)
RBC # FLD: 16.5 % — HIGH (ref 10.3–14.5)
RBC BLD AUTO: ABNORMAL
RSV RNA SPEC QL NAA+PROBE: SIGNIFICANT CHANGE UP
RV+EV RNA SPEC QL NAA+PROBE: SIGNIFICANT CHANGE UP
SAO2 % BLDV: 20.9 % — LOW (ref 67–88)
SARS-COV-2 RNA SPEC QL NAA+PROBE: SIGNIFICANT CHANGE UP
SODIUM SERPL-SCNC: 138 MMOL/L — SIGNIFICANT CHANGE UP (ref 135–145)
WBC # BLD: 3.47 K/UL — LOW (ref 3.8–10.5)
WBC # FLD AUTO: 3.47 K/UL — LOW (ref 3.8–10.5)

## 2023-03-07 PROCEDURE — 74177 CT ABD & PELVIS W/CONTRAST: CPT | Mod: 26,MA

## 2023-03-07 PROCEDURE — 99285 EMERGENCY DEPT VISIT HI MDM: CPT

## 2023-03-07 RX ORDER — IOHEXOL 300 MG/ML
30 INJECTION, SOLUTION INTRAVENOUS ONCE
Refills: 0 | Status: COMPLETED | OUTPATIENT
Start: 2023-03-07 | End: 2023-03-07

## 2023-03-07 RX ORDER — MORPHINE SULFATE 50 MG/1
4 CAPSULE, EXTENDED RELEASE ORAL ONCE
Refills: 0 | Status: DISCONTINUED | OUTPATIENT
Start: 2023-03-07 | End: 2023-03-07

## 2023-03-07 RX ORDER — ACETAMINOPHEN 500 MG
1000 TABLET ORAL ONCE
Refills: 0 | Status: DISCONTINUED | OUTPATIENT
Start: 2023-03-07 | End: 2023-03-07

## 2023-03-07 RX ORDER — ONDANSETRON 8 MG/1
4 TABLET, FILM COATED ORAL ONCE
Refills: 0 | Status: COMPLETED | OUTPATIENT
Start: 2023-03-07 | End: 2023-03-07

## 2023-03-07 RX ORDER — SODIUM CHLORIDE 9 MG/ML
1000 INJECTION INTRAMUSCULAR; INTRAVENOUS; SUBCUTANEOUS ONCE
Refills: 0 | Status: COMPLETED | OUTPATIENT
Start: 2023-03-07 | End: 2023-03-07

## 2023-03-07 RX ORDER — ONDANSETRON 8 MG/1
1 TABLET, FILM COATED ORAL
Qty: 9 | Refills: 0
Start: 2023-03-07 | End: 2023-03-09

## 2023-03-07 RX ORDER — DIPHENHYDRAMINE HCL 50 MG
25 CAPSULE ORAL ONCE
Refills: 0 | Status: COMPLETED | OUTPATIENT
Start: 2023-03-07 | End: 2023-03-07

## 2023-03-07 RX ADMIN — Medication 25 MILLIGRAM(S): at 13:06

## 2023-03-07 RX ADMIN — IOHEXOL 30 MILLILITER(S): 300 INJECTION, SOLUTION INTRAVENOUS at 14:49

## 2023-03-07 RX ADMIN — MORPHINE SULFATE 4 MILLIGRAM(S): 50 CAPSULE, EXTENDED RELEASE ORAL at 12:41

## 2023-03-07 RX ADMIN — SODIUM CHLORIDE 1000 MILLILITER(S): 9 INJECTION INTRAMUSCULAR; INTRAVENOUS; SUBCUTANEOUS at 12:47

## 2023-03-07 RX ADMIN — ONDANSETRON 4 MILLIGRAM(S): 8 TABLET, FILM COATED ORAL at 12:41

## 2023-03-07 RX ADMIN — MORPHINE SULFATE 4 MILLIGRAM(S): 50 CAPSULE, EXTENDED RELEASE ORAL at 13:11

## 2023-03-07 NOTE — ED ADULT NURSE REASSESSMENT NOTE - BP NONINVASIVE SYSTOLIC (MM HG)
104
101
[Lower Ext Edema] : lower extremity edema
[Leg Claudication] : intermittent leg claudication
[Negative] : Heme/Lymph

## 2023-03-07 NOTE — ED ADULT NURSE NOTE - CHIEF COMPLAINT QUOTE
Patient has c/o abdominal pain, nausea and diarrhea, was seen at his MD. Had a CT Scan and he has lymph nodes in abdomen. sent to Er by Oncologist. Pt also has elevated PSA level.
Emergent/ED

## 2023-03-07 NOTE — ED PROVIDER NOTE - PHYSICAL EXAMINATION
GENERAL: Awake, alert, uncomfortable secondary to pain.  HEENT: NC/AT, dry mucous membranes, EOMI  LUNGS: CTAB, no wheezes or crackles   CARDIAC: RRR, no m/r/g  ABDOMEN: Soft, tenderness to palpation throughout.  BACK: no CVA tenderness  EXT: No edema, no calf tenderness, no deformities.  NEURO: A&Ox3. Moving all extremities.  SKIN: Warm and dry. No rash.  PSYCH: Normal affect.

## 2023-03-07 NOTE — ED PROVIDER NOTE - PATIENT PORTAL LINK FT
You can access the FollowMyHealth Patient Portal offered by [facility] by registering at the following website: [email or web address]. By joining [employer]’s FollowMyHealth portal, you will also be able to view your health information using other applications (apps) compatible with our system.

## 2023-03-07 NOTE — ED ADULT TRIAGE NOTE - CHIEF COMPLAINT QUOTE
Patient has c/o abdominal pain, nausea and diarrhea, was seen at his MD. Had a CT Scan and he has lymph nodes in abdomen. sent to Er by Oncologist. Pt also has elevated PSA level.

## 2023-03-07 NOTE — ED ADULT NURSE NOTE - OBJECTIVE STATEMENT
Pt was received into spot 10. AxO 4, ambulatory. C/O abdominal pain with nausea and vomiting x1 week. pt denies blood in the vomit. Pt was seen by his MD, and a CT was performed showing swollen lymph nodes in the abdomen, and ha s elevated PSA levels. Pt states he has difficulty urinating, and there is blood in his urine. pt denies rectal bleeding. Abdomen is tender to touch, normal active bowel sounds, soft, and nondistended. Pt states he is able to bass flatulence, and stool. Respirations are even and unlabored. Pulses equal bilaterally, no edema noted. Pt denies headache, dizziness, chest pain, SOB, constipation, fever like symptoms. 20 G placed in the right AC. Labs obtained and sent to the lab. pt is awaiting CT. Medicated as ordered. Will continue to monitor.

## 2023-03-07 NOTE — ED PROVIDER NOTE - OBJECTIVE STATEMENT
58-year-old male patient past medical history of gastric sleeve, high blood pressure, CAD, A-fib on Xarelto who presents to the ED for complaints of worsening abdominal pain for the past 3 days.  Patient endorsing pain exacerbated with meals, and has since 1 day not been able to tolerate anything by mouth.  Has had loose stools for the past 2 years with no significant changes recently, except for bloody stools 2 weeks ago.  Patient underwent CT scan 1 month ago and was found with diffuse enlarged lymph nodes.  Was endorsed to follow-up with heme-onc who performed a bone marrow biopsy.  No official results yet.

## 2023-03-07 NOTE — ED PROVIDER NOTE - CLINICAL SUMMARY MEDICAL DECISION MAKING FREE TEXT BOX
58-year-old male patient past medical history of gastric sleeve, high blood pressure, CAD who presents to the ED for concerns of worsening abdominal pain now unable to tolerate meals.  Recent bone marrow biopsy for diffuse enlarged lymph nodes in the abdomen.  Of note, with recent elevated PSA scheduled for biopsy.  On exam, patient with diffuse tenderness throughout and dry mucous membranes.  Will assess with CT scan given surgical history and blood work.  Will assess for SBO versus volvulus versus any abdominal pathology.

## 2023-03-07 NOTE — ED ADULT NURSE REASSESSMENT NOTE - NS ED NURSE REASSESS COMMENT FT1
Pt does not appear to be cherelle cute distress at this time. Awaiting CT results.
Pt appears to be resting at this time. Pt states he has some itchiness, and requesting Benadryl. Will notify MD.

## 2023-03-07 NOTE — ED PROVIDER NOTE - NSFOLLOWUPINSTRUCTIONS_ED_ALL_ED_FT
You were seen for abdominal pain and vomiting. Please find your results attached.     May continue taking tylenol and motrin for pain management.     Zofran was sent to your pharmacy for nausea control.     Please follow up with hematology/oncology tomorrow.  Follow up with your primary care doctor.

## 2023-03-07 NOTE — ED PROVIDER NOTE - PROGRESS NOTE DETAILS
CT redemonstrates lymph node enlargement. no SBO or pathology. Will PO challenge and dispo Tolerated PO challenge. Will discharge with return precautions and followup.

## 2023-03-07 NOTE — ED PROVIDER NOTE - ATTENDING CONTRIBUTION TO CARE
58-year-old male patient past medical history of gastric sleeve, high blood pressure, CAD, A-fib on Xarelto who presents to the ED for complaints of worsening abdominal pain for the past 3 days, worse after eating assoc w nausea no vomiting. Was referred to the emergency department by his oncologist for further evaluation.  Apparently had CT scan a few weeks ago showing enlarged abdominal lymph nodes.  Also underwent recent biopsy for concern for cancer.  Plan for labs and CT abdomen pelvis to evaluate for possible obstruction, infection, enlarged lymph nodes

## 2023-03-21 ENCOUNTER — TRANSCRIPTION ENCOUNTER (OUTPATIENT)
Age: 58
End: 2023-03-21

## 2023-03-21 ENCOUNTER — INPATIENT (INPATIENT)
Facility: HOSPITAL | Age: 58
LOS: 1 days | Discharge: HOME | End: 2023-03-23
Attending: STUDENT IN AN ORGANIZED HEALTH CARE EDUCATION/TRAINING PROGRAM | Admitting: STUDENT IN AN ORGANIZED HEALTH CARE EDUCATION/TRAINING PROGRAM
Payer: COMMERCIAL

## 2023-03-21 VITALS
HEART RATE: 94 BPM | SYSTOLIC BLOOD PRESSURE: 113 MMHG | TEMPERATURE: 98 F | DIASTOLIC BLOOD PRESSURE: 62 MMHG | RESPIRATION RATE: 18 BRPM | OXYGEN SATURATION: 100 %

## 2023-03-21 DIAGNOSIS — Z98.890 OTHER SPECIFIED POSTPROCEDURAL STATES: Chronic | ICD-10-CM

## 2023-03-21 DIAGNOSIS — Z98.89 OTHER SPECIFIED POSTPROCEDURAL STATES: Chronic | ICD-10-CM

## 2023-03-21 DIAGNOSIS — Z90.89 ACQUIRED ABSENCE OF OTHER ORGANS: Chronic | ICD-10-CM

## 2023-03-21 DIAGNOSIS — Z98.84 BARIATRIC SURGERY STATUS: Chronic | ICD-10-CM

## 2023-03-21 DIAGNOSIS — Z90.49 ACQUIRED ABSENCE OF OTHER SPECIFIED PARTS OF DIGESTIVE TRACT: Chronic | ICD-10-CM

## 2023-03-21 LAB
ALBUMIN SERPL ELPH-MCNC: 3.2 G/DL — LOW (ref 3.3–5)
ALP SERPL-CCNC: 88 U/L — SIGNIFICANT CHANGE UP (ref 40–120)
ALT FLD-CCNC: 86 U/L — HIGH (ref 4–41)
ANION GAP SERPL CALC-SCNC: 8 MMOL/L — SIGNIFICANT CHANGE UP (ref 7–14)
APPEARANCE UR: CLEAR — SIGNIFICANT CHANGE UP
AST SERPL-CCNC: 59 U/L — HIGH (ref 4–40)
BACTERIA # UR AUTO: NEGATIVE — SIGNIFICANT CHANGE UP
BASE EXCESS BLDV CALC-SCNC: 1.5 MMOL/L — SIGNIFICANT CHANGE UP (ref -2–3)
BASOPHILS # BLD AUTO: 0.04 K/UL — SIGNIFICANT CHANGE UP (ref 0–0.2)
BASOPHILS NFR BLD AUTO: 0.9 % — SIGNIFICANT CHANGE UP (ref 0–2)
BILIRUB SERPL-MCNC: 0.4 MG/DL — SIGNIFICANT CHANGE UP (ref 0.2–1.2)
BILIRUB UR-MCNC: NEGATIVE — SIGNIFICANT CHANGE UP
BLOOD GAS VENOUS COMPREHENSIVE RESULT: SIGNIFICANT CHANGE UP
BUN SERPL-MCNC: 9 MG/DL — SIGNIFICANT CHANGE UP (ref 7–23)
CALCIUM SERPL-MCNC: 8 MG/DL — LOW (ref 8.4–10.5)
CHLORIDE BLDV-SCNC: 101 MMOL/L — SIGNIFICANT CHANGE UP (ref 96–108)
CHLORIDE SERPL-SCNC: 104 MMOL/L — SIGNIFICANT CHANGE UP (ref 98–107)
CO2 BLDV-SCNC: 30 MMOL/L — HIGH (ref 22–26)
CO2 SERPL-SCNC: 24 MMOL/L — SIGNIFICANT CHANGE UP (ref 22–31)
COLOR SPEC: YELLOW — SIGNIFICANT CHANGE UP
CREAT SERPL-MCNC: 0.69 MG/DL — SIGNIFICANT CHANGE UP (ref 0.5–1.3)
DIFF PNL FLD: ABNORMAL
EGFR: 106 ML/MIN/1.73M2 — SIGNIFICANT CHANGE UP
EOSINOPHIL # BLD AUTO: 0.17 K/UL — SIGNIFICANT CHANGE UP (ref 0–0.5)
EOSINOPHIL NFR BLD AUTO: 3.9 % — SIGNIFICANT CHANGE UP (ref 0–6)
EPI CELLS # UR: 1 /HPF — SIGNIFICANT CHANGE UP (ref 0–5)
FLUAV AG NPH QL: SIGNIFICANT CHANGE UP
FLUBV AG NPH QL: SIGNIFICANT CHANGE UP
GAS PNL BLDV: 135 MMOL/L — LOW (ref 136–145)
GLUCOSE BLDV-MCNC: 100 MG/DL — HIGH (ref 70–99)
GLUCOSE SERPL-MCNC: 103 MG/DL — HIGH (ref 70–99)
GLUCOSE UR QL: NEGATIVE — SIGNIFICANT CHANGE UP
HCO3 BLDV-SCNC: 28 MMOL/L — SIGNIFICANT CHANGE UP (ref 22–29)
HCT VFR BLD CALC: 35.4 % — LOW (ref 39–50)
HCT VFR BLDA CALC: 30 % — LOW (ref 39–51)
HGB BLD CALC-MCNC: 9.9 G/DL — LOW (ref 12.6–17.4)
HGB BLD-MCNC: 10.1 G/DL — LOW (ref 13–17)
HYALINE CASTS # UR AUTO: 2 /LPF — SIGNIFICANT CHANGE UP (ref 0–7)
IANC: 1.7 K/UL — LOW (ref 1.8–7.4)
IMM GRANULOCYTES NFR BLD AUTO: 0.2 % — SIGNIFICANT CHANGE UP (ref 0–0.9)
KETONES UR-MCNC: NEGATIVE — SIGNIFICANT CHANGE UP
LACTATE BLDV-MCNC: 0.6 MMOL/L — SIGNIFICANT CHANGE UP (ref 0.5–2)
LEUKOCYTE ESTERASE UR-ACNC: NEGATIVE — SIGNIFICANT CHANGE UP
LIDOCAIN IGE QN: 57 U/L — SIGNIFICANT CHANGE UP (ref 7–60)
LYMPHOCYTES # BLD AUTO: 1.98 K/UL — SIGNIFICANT CHANGE UP (ref 1–3.3)
LYMPHOCYTES # BLD AUTO: 45.9 % — HIGH (ref 13–44)
MCHC RBC-ENTMCNC: 20.6 PG — LOW (ref 27–34)
MCHC RBC-ENTMCNC: 28.5 GM/DL — LOW (ref 32–36)
MCV RBC AUTO: 72.2 FL — LOW (ref 80–100)
MONOCYTES # BLD AUTO: 0.41 K/UL — SIGNIFICANT CHANGE UP (ref 0–0.9)
MONOCYTES NFR BLD AUTO: 9.5 % — SIGNIFICANT CHANGE UP (ref 2–14)
NEUTROPHILS # BLD AUTO: 1.7 K/UL — LOW (ref 1.8–7.4)
NEUTROPHILS NFR BLD AUTO: 39.6 % — LOW (ref 43–77)
NITRITE UR-MCNC: NEGATIVE — SIGNIFICANT CHANGE UP
NRBC # BLD: 0 /100 WBCS — SIGNIFICANT CHANGE UP (ref 0–0)
NRBC # FLD: 0 K/UL — SIGNIFICANT CHANGE UP (ref 0–0)
PCO2 BLDV: 55 MMHG — SIGNIFICANT CHANGE UP (ref 42–55)
PH BLDV: 7.32 — SIGNIFICANT CHANGE UP (ref 7.32–7.43)
PH UR: 6 — SIGNIFICANT CHANGE UP (ref 5–8)
PLATELET # BLD AUTO: 219 K/UL — SIGNIFICANT CHANGE UP (ref 150–400)
PO2 BLDV: 27 MMHG — SIGNIFICANT CHANGE UP (ref 25–45)
POTASSIUM BLDV-SCNC: 3.8 MMOL/L — SIGNIFICANT CHANGE UP (ref 3.5–5.1)
POTASSIUM SERPL-MCNC: 3.9 MMOL/L — SIGNIFICANT CHANGE UP (ref 3.5–5.3)
POTASSIUM SERPL-SCNC: 3.9 MMOL/L — SIGNIFICANT CHANGE UP (ref 3.5–5.3)
PROT SERPL-MCNC: 6.5 G/DL — SIGNIFICANT CHANGE UP (ref 6–8.3)
PROT UR-MCNC: ABNORMAL
RBC # BLD: 4.9 M/UL — SIGNIFICANT CHANGE UP (ref 4.2–5.8)
RBC # FLD: 21 % — HIGH (ref 10.3–14.5)
RBC CASTS # UR COMP ASSIST: 26 /HPF — HIGH (ref 0–4)
RSV RNA NPH QL NAA+NON-PROBE: SIGNIFICANT CHANGE UP
SAO2 % BLDV: 32.8 % — LOW (ref 67–88)
SARS-COV-2 RNA SPEC QL NAA+PROBE: SIGNIFICANT CHANGE UP
SODIUM SERPL-SCNC: 136 MMOL/L — SIGNIFICANT CHANGE UP (ref 135–145)
SP GR SPEC: 1.02 — SIGNIFICANT CHANGE UP (ref 1.01–1.05)
UROBILINOGEN FLD QL: SIGNIFICANT CHANGE UP
WBC # BLD: 4.31 K/UL — SIGNIFICANT CHANGE UP (ref 3.8–10.5)
WBC # FLD AUTO: 4.31 K/UL — SIGNIFICANT CHANGE UP (ref 3.8–10.5)
WBC UR QL: 3 /HPF — SIGNIFICANT CHANGE UP (ref 0–5)

## 2023-03-21 PROCEDURE — 99285 EMERGENCY DEPT VISIT HI MDM: CPT

## 2023-03-21 RX ORDER — MORPHINE SULFATE 50 MG/1
4 CAPSULE, EXTENDED RELEASE ORAL ONCE
Refills: 0 | Status: DISCONTINUED | OUTPATIENT
Start: 2023-03-21 | End: 2023-03-21

## 2023-03-21 RX ORDER — ONDANSETRON 8 MG/1
4 TABLET, FILM COATED ORAL ONCE
Refills: 0 | Status: COMPLETED | OUTPATIENT
Start: 2023-03-21 | End: 2023-03-21

## 2023-03-21 RX ORDER — SODIUM CHLORIDE 9 MG/ML
1000 INJECTION INTRAMUSCULAR; INTRAVENOUS; SUBCUTANEOUS ONCE
Refills: 0 | Status: COMPLETED | OUTPATIENT
Start: 2023-03-21 | End: 2023-03-21

## 2023-03-21 RX ORDER — IOHEXOL 300 MG/ML
30 INJECTION, SOLUTION INTRAVENOUS ONCE
Refills: 0 | Status: COMPLETED | OUTPATIENT
Start: 2023-03-21 | End: 2023-03-21

## 2023-03-21 RX ADMIN — ONDANSETRON 4 MILLIGRAM(S): 8 TABLET, FILM COATED ORAL at 22:47

## 2023-03-21 RX ADMIN — MORPHINE SULFATE 4 MILLIGRAM(S): 50 CAPSULE, EXTENDED RELEASE ORAL at 22:50

## 2023-03-21 RX ADMIN — IOHEXOL 30 MILLILITER(S): 300 INJECTION, SOLUTION INTRAVENOUS at 23:20

## 2023-03-21 RX ADMIN — SODIUM CHLORIDE 1000 MILLILITER(S): 9 INJECTION INTRAMUSCULAR; INTRAVENOUS; SUBCUTANEOUS at 22:46

## 2023-03-21 NOTE — ED ADULT NURSE NOTE - NSIMPLEMENTINTERV_GEN_ALL_ED
used
Implemented All Universal Safety Interventions:  [place] to call system. Call bell, personal items and telephone within reach. Instruct patient to call for assistance. Room bathroom lighting operational. Non-slip footwear when patient is off stretcher. Physically safe environment: no spills, clutter or unnecessary equipment. Stretcher in lowest position, wheels locked, appropriate side rails in place.

## 2023-03-21 NOTE — ED PROVIDER NOTE - NS ED ROS FT
Constitutional: No fever. no chills.   Eyes: No visual changes, eye pain or redness  HEENT: No throat pain, hearing changes, ear pain, nasal pain. No nose bleeding.  CV: No chest pain, no palpitations  Resp: No shortness of breath, no cough  GI: +abdominal pain. No nausea. no  vomiting. No diarrhea. No constipation.   : No dysuria, hematuria. no urinary frequency, no urinary urgency.  MSK: No musculoskeletal pain  Skin: No rash, lesions, no bruises  Neuro: No headache. No numbness or tingling. No weakness. No dizziness.  Heme: no easy bruising.  Allergy/Immunology: allergy to Avelox (rash, weakness)

## 2023-03-21 NOTE — ED PROVIDER NOTE - PHYSICAL EXAMINATION
GEN: no acute respiratory distress. nontoxic, speaking comfortably in full sentences, ambulating with steady gait. in moderate pain  HEENT: NCAT. face symmetrical. PERRL 4mm, EOMI, MMM, oropharynx wnl.  Neck: no JVD, trachea midline, no lymphadenopathy, FROM  CV: RRR. +S1S2, no murmur. 2+ pulses in 4 extremities, cap refill <2 sec  Chest: CTA B/l. no wheezing, rales, rhonchi. no retractions. good air movement. no tenderness. no rash or ecchymosis  ABD: +BS, soft, non distended,  tender suprapubic region. No guarding/rebound.   : no cva tenderness  MSK: No clubbing, cyanosis, edema. FROM of all extremities. no tenderness to palpation. No midline or paraspinal tenderness. no spinal step-offs.  Neuro: AAOX3.  Sensation intact, motor 5/5 throughout.   SKIN: No rash

## 2023-03-21 NOTE — ED PROVIDER NOTE - PROGRESS NOTE DETAILS
NAD. Abdomen soft NTND. Awaiting CT results. LEO. Radiology reports internal hernia LUQ. Pt NAD. Surgery aware. LEO.

## 2023-03-21 NOTE — ED PROVIDER NOTE - CLINICAL SUMMARY MEDICAL DECISION MAKING FREE TEXT BOX
58-year-old male with sudden onset abdominal pain this evening.  Had reported hematuria for close to 1 week as well as recent prostate biopsy and not urinating for most of the day today.  POCUS performed, no hydronephrosis, artery 95 mL and bladder without evidence of any debris or clot.  Patient within several minutes of ultrasound was able to urinate and appropriate volume of urine with minimal effort, low suspicion for urinary retention at this time.  Differential diagnosis includes, but not limited to, UTI, nephrolithiasis, hematoma postbiopsy.  Overall lower suspicion for obstruction, symptoms and region of tenderness make internal hernia lower suspicion.  Lower suspicion for appendicitis, colitis.  Plan: Symptom relief, labs, CT, reassess.

## 2023-03-21 NOTE — ED PROVIDER NOTE - OBJECTIVE STATEMENT
58-year-old male past medical history hypertension, obstructive CAD, paroxysmal A-fib on Xarelto, asthma, remote gastric sleeve, GERD, iron deficiency anemia presents for sudden onset lower abdominal pain that started approximately 6 PM today.  Patient reports pain is diffuse but was sudden onset suprapubic region.  No clear inciting event.  Denies any fever or chills.  Patient reports has not urinated since earlier this morning, at bedtime did not have any dysuria.  Does report recently in ED for similar symptoms and found to have lymphadenopathy which is pending outpatient work-up.  Does also report being found to have elevated PSA and had prostate biopsy on March 13.  Patient was taken off Xarelto prior to procedure and has since been restarted on it.  Reports approximately 1 week of clear but red urine with urination without any pain.  Denies chest pain, palpitations, shortness of breath.  Reported feeling nauseous and vomited initially with pain however denies any current nauseousness. 58-year-old male past medical history hypertension, obstructive CAD, paroxysmal A-fib on Xarelto, asthma, remote gastric sleeve, GERD, iron deficiency anemia presents for sudden onset lower abdominal pain that started approximately 6 PM today.  Patient reports pain is diffuse but was sudden onset suprapubic region.  No clear inciting event.  Denies any fever or chills.  Patient reports has not urinated since earlier this morning, at bedtime did not have any dysuria.  Does report recently in ED for similar symptoms and found to have lymphadenopathy which is pending outpatient work-up.  Does also report being found to have elevated PSA and had prostate biopsy on March 13.  Patient was taken off Xarelto prior to procedure and has since been restarted on it.  Reports approximately 1 week of clear but red urine with urination without any pain.  Denies chest pain, palpitations, shortness of breath.  Reported feeling nauseous and vomited initially with pain however denies any current nauseousness.  Gastric sleeve 10-15 years ago at [facility]. RLQ hernia also repaired 10 years ago.

## 2023-03-21 NOTE — ED ADULT NURSE NOTE - OBJECTIVE STATEMENT
Pt arrives to ED intake rm 10A A&Ox4 and ambulatory c/o generalized abd pain since 1800 tonight, since pain onset pt endorses 2 episodes of vomiting, denies blood in vomit. Pt states last BM was today and normal to pt. Pt denies fevers, chills or diarrhea. Respirations are even and unlabored 18G placed to RAC, labs drawn and sent. Medicated as ordered

## 2023-03-21 NOTE — ED ADULT TRIAGE NOTE - CHIEF COMPLAINT QUOTE
Pt c/o of abdominal pain x1 days. Endorses n/v, chills. Denies diarrhea, chills, recent travel. Pt had prostate biopsy  3/13. PMHx: htn, borderline DM

## 2023-03-22 ENCOUNTER — TRANSCRIPTION ENCOUNTER (OUTPATIENT)
Age: 58
End: 2023-03-22

## 2023-03-22 DIAGNOSIS — R10.9 UNSPECIFIED ABDOMINAL PAIN: ICD-10-CM

## 2023-03-22 LAB
ALBUMIN SERPL ELPH-MCNC: 3.3 G/DL — SIGNIFICANT CHANGE UP (ref 3.3–5)
ALP SERPL-CCNC: 84 U/L — SIGNIFICANT CHANGE UP (ref 40–120)
ALT FLD-CCNC: 71 U/L — HIGH (ref 4–41)
ANION GAP SERPL CALC-SCNC: 7 MMOL/L — SIGNIFICANT CHANGE UP (ref 7–14)
APTT BLD: 34.3 SEC — SIGNIFICANT CHANGE UP (ref 27–36.3)
AST SERPL-CCNC: 50 U/L — HIGH (ref 4–40)
BILIRUB SERPL-MCNC: 0.4 MG/DL — SIGNIFICANT CHANGE UP (ref 0.2–1.2)
BLD GP AB SCN SERPL QL: NEGATIVE — SIGNIFICANT CHANGE UP
BUN SERPL-MCNC: 6 MG/DL — LOW (ref 7–23)
CALCIUM SERPL-MCNC: 7.9 MG/DL — LOW (ref 8.4–10.5)
CHLORIDE SERPL-SCNC: 105 MMOL/L — SIGNIFICANT CHANGE UP (ref 98–107)
CO2 SERPL-SCNC: 27 MMOL/L — SIGNIFICANT CHANGE UP (ref 22–31)
CREAT SERPL-MCNC: 0.56 MG/DL — SIGNIFICANT CHANGE UP (ref 0.5–1.3)
CULTURE RESULTS: SIGNIFICANT CHANGE UP
EGFR: 113 ML/MIN/1.73M2 — SIGNIFICANT CHANGE UP
GLUCOSE BLDC GLUCOMTR-MCNC: 127 MG/DL — HIGH (ref 70–99)
GLUCOSE SERPL-MCNC: 139 MG/DL — HIGH (ref 70–99)
HCT VFR BLD CALC: 34.9 % — LOW (ref 39–50)
HGB BLD-MCNC: 10.1 G/DL — LOW (ref 13–17)
INR BLD: 1.26 RATIO — HIGH (ref 0.88–1.16)
MAGNESIUM SERPL-MCNC: 1.8 MG/DL — SIGNIFICANT CHANGE UP (ref 1.6–2.6)
MCHC RBC-ENTMCNC: 21.1 PG — LOW (ref 27–34)
MCHC RBC-ENTMCNC: 28.9 GM/DL — LOW (ref 32–36)
MCV RBC AUTO: 73 FL — LOW (ref 80–100)
NRBC # BLD: 0 /100 WBCS — SIGNIFICANT CHANGE UP (ref 0–0)
NRBC # FLD: 0 K/UL — SIGNIFICANT CHANGE UP (ref 0–0)
PHOSPHATE SERPL-MCNC: 3.1 MG/DL — SIGNIFICANT CHANGE UP (ref 2.5–4.5)
PLATELET # BLD AUTO: 194 K/UL — SIGNIFICANT CHANGE UP (ref 150–400)
POTASSIUM SERPL-MCNC: 3.8 MMOL/L — SIGNIFICANT CHANGE UP (ref 3.5–5.3)
POTASSIUM SERPL-SCNC: 3.8 MMOL/L — SIGNIFICANT CHANGE UP (ref 3.5–5.3)
PROT SERPL-MCNC: 6.3 G/DL — SIGNIFICANT CHANGE UP (ref 6–8.3)
PROTHROM AB SERPL-ACNC: 14.7 SEC — HIGH (ref 10.5–13.4)
RBC # BLD: 4.78 M/UL — SIGNIFICANT CHANGE UP (ref 4.2–5.8)
RBC # FLD: 21.1 % — HIGH (ref 10.3–14.5)
RH IG SCN BLD-IMP: POSITIVE — SIGNIFICANT CHANGE UP
SODIUM SERPL-SCNC: 139 MMOL/L — SIGNIFICANT CHANGE UP (ref 135–145)
SPECIMEN SOURCE: SIGNIFICANT CHANGE UP
WBC # BLD: 5.92 K/UL — SIGNIFICANT CHANGE UP (ref 3.8–10.5)
WBC # FLD AUTO: 5.92 K/UL — SIGNIFICANT CHANGE UP (ref 3.8–10.5)

## 2023-03-22 PROCEDURE — 44050 REDUCE BOWEL OBSTRUCTION: CPT | Mod: 52

## 2023-03-22 PROCEDURE — 74177 CT ABD & PELVIS W/CONTRAST: CPT | Mod: 26,MA

## 2023-03-22 PROCEDURE — 99222 1ST HOSP IP/OBS MODERATE 55: CPT | Mod: GC,57

## 2023-03-22 RX ORDER — PANTOPRAZOLE SODIUM 20 MG/1
40 TABLET, DELAYED RELEASE ORAL
Refills: 0 | Status: DISCONTINUED | OUTPATIENT
Start: 2023-03-22 | End: 2023-03-23

## 2023-03-22 RX ORDER — ONDANSETRON 8 MG/1
4 TABLET, FILM COATED ORAL ONCE
Refills: 0 | Status: DISCONTINUED | OUTPATIENT
Start: 2023-03-22 | End: 2023-03-22

## 2023-03-22 RX ORDER — HYDROMORPHONE HYDROCHLORIDE 2 MG/ML
0.5 INJECTION INTRAMUSCULAR; INTRAVENOUS; SUBCUTANEOUS
Refills: 0 | Status: DISCONTINUED | OUTPATIENT
Start: 2023-03-22 | End: 2023-03-22

## 2023-03-22 RX ORDER — OXYCODONE HYDROCHLORIDE 5 MG/1
1 TABLET ORAL
Qty: 0 | Refills: 0 | DISCHARGE

## 2023-03-22 RX ORDER — GABAPENTIN 400 MG/1
200 CAPSULE ORAL
Qty: 0 | Refills: 0 | DISCHARGE

## 2023-03-22 RX ORDER — SODIUM CHLORIDE 9 MG/ML
1000 INJECTION, SOLUTION INTRAVENOUS
Refills: 0 | Status: DISCONTINUED | OUTPATIENT
Start: 2023-03-22 | End: 2023-03-22

## 2023-03-22 RX ORDER — IBUPROFEN 200 MG
400 TABLET ORAL EVERY 6 HOURS
Refills: 0 | Status: DISCONTINUED | OUTPATIENT
Start: 2023-03-22 | End: 2023-03-23

## 2023-03-22 RX ORDER — SUCRALFATE 1 G
1 TABLET ORAL
Refills: 0 | Status: DISCONTINUED | OUTPATIENT
Start: 2023-03-22 | End: 2023-03-23

## 2023-03-22 RX ORDER — ATORVASTATIN CALCIUM 80 MG/1
80 TABLET, FILM COATED ORAL AT BEDTIME
Refills: 0 | Status: DISCONTINUED | OUTPATIENT
Start: 2023-03-22 | End: 2023-03-23

## 2023-03-22 RX ORDER — OXYCODONE HYDROCHLORIDE 5 MG/1
2.5 TABLET ORAL EVERY 6 HOURS
Refills: 0 | Status: DISCONTINUED | OUTPATIENT
Start: 2023-03-22 | End: 2023-03-23

## 2023-03-22 RX ORDER — FENTANYL CITRATE 50 UG/ML
50 INJECTION INTRAVENOUS
Refills: 0 | Status: DISCONTINUED | OUTPATIENT
Start: 2023-03-22 | End: 2023-03-22

## 2023-03-22 RX ORDER — ACETAMINOPHEN 500 MG
975 TABLET ORAL EVERY 6 HOURS
Refills: 0 | Status: DISCONTINUED | OUTPATIENT
Start: 2023-03-22 | End: 2023-03-23

## 2023-03-22 RX ORDER — OXYCODONE HYDROCHLORIDE 5 MG/1
5 TABLET ORAL EVERY 6 HOURS
Refills: 0 | Status: DISCONTINUED | OUTPATIENT
Start: 2023-03-22 | End: 2023-03-23

## 2023-03-22 RX ORDER — ENOXAPARIN SODIUM 100 MG/ML
40 INJECTION SUBCUTANEOUS EVERY 24 HOURS
Refills: 0 | Status: DISCONTINUED | OUTPATIENT
Start: 2023-03-22 | End: 2023-03-23

## 2023-03-22 RX ORDER — TAMSULOSIN HYDROCHLORIDE 0.4 MG/1
0.8 CAPSULE ORAL AT BEDTIME
Refills: 0 | Status: DISCONTINUED | OUTPATIENT
Start: 2023-03-22 | End: 2023-03-23

## 2023-03-22 RX ADMIN — ENOXAPARIN SODIUM 40 MILLIGRAM(S): 100 INJECTION SUBCUTANEOUS at 14:36

## 2023-03-22 RX ADMIN — Medication 975 MILLIGRAM(S): at 22:25

## 2023-03-22 RX ADMIN — Medication 975 MILLIGRAM(S): at 21:39

## 2023-03-22 RX ADMIN — ATORVASTATIN CALCIUM 80 MILLIGRAM(S): 80 TABLET, FILM COATED ORAL at 21:40

## 2023-03-22 RX ADMIN — Medication 975 MILLIGRAM(S): at 14:36

## 2023-03-22 RX ADMIN — Medication 1 GRAM(S): at 12:53

## 2023-03-22 RX ADMIN — Medication 975 MILLIGRAM(S): at 15:00

## 2023-03-22 RX ADMIN — Medication 1 GRAM(S): at 17:30

## 2023-03-22 RX ADMIN — TAMSULOSIN HYDROCHLORIDE 0.8 MILLIGRAM(S): 0.4 CAPSULE ORAL at 21:40

## 2023-03-22 NOTE — DISCHARGE NOTE PROVIDER - CARE PROVIDER_API CALL
Jhon Lepe)  Surgery  [address] [place] [address]  Phone: [phone]  Fax: [phone]  Follow Up Time: 1 week   Jhon Lepe)  Surgery  [address] [place] [address]  Phone: [phone]  Fax: [phone]  Follow Up Time: 1 week    Jean Rosales)  Surgery  [address] [facility], [place] [address]  Phone: [phone]  Fax: ()-  Follow Up Time: 1 week   Jhon Lepe)  Surgery  [address] [place] [address]  Phone: [phone]  Fax: [phone]  Follow Up Time: 1 week    Jean Rosales)  Surgery  [address] [facility], [place] [address]  Phone: [phone]  Fax: ()-  Follow Up Time: 1 week    London Nichols)  Internal Medicine  [address] [facility], [address]  [place] [address]  Phone: [phone]  Fax: [phone]  Follow Up Time: 1 week   Jhon Lepe)  Surgery  [address] [place] [address]  Phone: [phone]  Fax: [phone]  Follow Up Time: 1 week    Holli Rosales; PhD)  Internal Medicine; Medical Oncology  [facility], [address]  [place] [address]  Phone: [phone]  Fax: [phone]  Follow Up Time: 1 week

## 2023-03-22 NOTE — DISCHARGE NOTE PROVIDER - NSDCFUADDINST_GEN_ALL_CORE_FT
WOUND CARE: Please keep incisions clean and dry.  Please do not scrub or rub incisions.  Do not use lotion or powder on incisions.   BATHING: Please do not submerge wound underwater.  You may shower and / or sponge bathe.  ACTIVITY: No heavy lifting or straining.  Otherwise, you may return to your usual level of physical activity.  If you are taking narcotic pain medication (such as Oxycodone or Percocet) DO NOT drive a car, operate machinery or make important decisions.  DIET: Resume a __________ diet.  NOTIFY YOUR SURGEON IF: You have any bleeding that does not stop, any pus draining from your wound(s), any fever (over 100.4 F) or chills, persistent nausea / vomiting, persistent diarrhea or if your pain is not controlled on your discharge pain medications.  FOLLOW-UP:   1. Please follow up with your primary care physician in one week regarding your hospitalization.    2. Please follow-up with your surgeon, Dr. Lepe within 7 days following discharge.  Please call [phone] or [phone] to schedule an appointment. WOUND CARE: Please keep incisions clean and dry.  Please do not scrub or rub incisions.  Do not use lotion or powder on incisions.   BATHING: Please do not submerge wound underwater.  You may shower and / or sponge bathe.  ACTIVITY: No heavy lifting or straining.  Otherwise, you may return to your usual level of physical activity.  If you are taking narcotic pain medication (such as Oxycodone or Percocet) DO NOT drive a car, operate machinery or make important decisions.  DIET: Resume a regular diet.  NOTIFY YOUR SURGEON IF: You have any bleeding that does not stop, any pus draining from your wound(s), any fever (over 100.4 F) or chills, persistent nausea / vomiting, persistent diarrhea or if your pain is not controlled on your discharge pain medications.  FOLLOW-UP:   1. Please follow up with your primary care physician in one week regarding your hospitalization.    2. Please follow-up with your general surgeon, Dr. Lepe within 7 days following discharge.  Please call [phone] or [phone] to schedule an appointment.  2. Please follow-up with your surgical oncologist, Dr. Rosales within 7 days following discharge.  Please call [phone]. WOUND CARE: Please keep incisions clean and dry.  Please do not scrub or rub incisions.  Do not use lotion or powder on incisions.   BATHING: Please do not submerge wound underwater.  You may shower and / or sponge bathe.  ACTIVITY: No heavy lifting or straining.  Otherwise, you may return to your usual level of physical activity.  If you are taking narcotic pain medication (such as Oxycodone or Percocet) DO NOT drive a car, operate machinery or make important decisions.  DIET: Resume a regular diet.  NOTIFY YOUR SURGEON IF: You have any bleeding that does not stop, any pus draining from your wound(s), any fever (over 100.4 F) or chills, persistent nausea / vomiting, persistent diarrhea or if your pain is not controlled on your discharge pain medications.  FOLLOW-UP:   1. Please follow up with your primary care physician in one week regarding your hospitalization.    2. Please follow-up with your general surgeon, Dr. Lepe within 7 days following discharge.  Please call [phone] or [phone] to schedule an appointment.  3. Please follow-up with your surgical oncologist, Dr. Rosales within 7 days following discharge.  Please call [phone].  4. Please follow-up with your hematologist, Dr. Nichols within 7 days following discharge.  Please call [phone]. WOUND CARE: Please keep incisions clean and dry.  Please do not scrub or rub incisions.  Do not use lotion or powder on incisions.   BATHING: Please do not submerge wound underwater.  You may shower and / or sponge bathe.  ACTIVITY: No heavy lifting or straining.  Otherwise, you may return to your usual level of physical activity.  If you are taking narcotic pain medication (such as Oxycodone or Percocet) DO NOT drive a car, operate machinery or make important decisions.  DIET: Resume a regular diet.  NOTIFY YOUR SURGEON IF: You have any bleeding that does not stop, any pus draining from your wound(s), any fever (over 100.4 F) or chills, persistent nausea / vomiting, persistent diarrhea or if your pain is not controlled on your discharge pain medications.  FOLLOW-UP:   1. Please follow up with your primary care physician in one week regarding your hospitalization.    2. Please follow-up with your general surgeon, Dr. Lepe within 7 days following discharge.  Please call [phone] or [phone] to schedule an appointment.  3. Please follow-up with your hematologist, Dr. Rosales within 7 days following discharge.  Please call [phone].

## 2023-03-22 NOTE — DISCHARGE NOTE PROVIDER - HOSPITAL COURSE
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    On 3/22 CT Abdomen / Pelvis performed showing internal hernia with pinching of the SMV and interval development of collateral venous drainage to the portal venous system in the setting of venous obstruction.  No small bowel obstruction or bowel ischemia is suggested at this time.  Increased hazy appearance to the central small bowel mesentery concerning for mesenteric edema.  Numerous prominent mesenteric lymph nodes again raises the possibility of lipoma and should be further worked up clinically.  Patient went to the OR for a laparoscopic reduction of internal and closure of defect.  Post operatively patient was advanced to a clear liquid diet, which she tolerated well.    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    On 3/22 CT Abdomen / Pelvis performed showing internal hernia with pinching of the SMV and interval development of collateral venous drainage to the portal venous system in the setting of venous obstruction.  No small bowel obstruction or bowel ischemia is suggested at this time.  Increased hazy appearance to the central small bowel mesentery concerning for mesenteric edema.  Numerous prominent mesenteric lymph nodes again raises the possibility of lipoma and should be further worked up clinically.  Patient went to the OR for a laparoscopic reduction of internal and closure of defect.  Post operatively patient was advanced to a clear liquid diet, which she tolerated well.  Patient was then advance to a regular diet, which he tolerated well.    At the time of discharge, the patient was hemodynamically stable, was tolerating PO diet, was voiding urine and passing stool.  He was ambulating and was comfortable with adequate pain control.  The patient was instructed to follow up with Dr. Lepe within 1 week after discharge from the hospital.  The patient / family felt comfortable with discharge.  The patient had no other issues.    Per attending, patient deemed medically stable and ready for discharge at this time.

## 2023-03-22 NOTE — H&P ADULT - ATTENDING COMMENTS
Patient with lap agnieszka en y gastric bypass done at [facility] presents with abdominal pain similar 3 weeks prior and was seen in ED. Patient on exam tender in LUQ and right hemiabdomen, no signs of peritonitis and appears comfortable. Labs are unremarkable, ct scan shows an internal hernia with pinching of the SMV. Patient with history of prostate biopsy in past, afib, on xarelto.  Discussed at length with patient and patient's wife, risks and benefits, he has a flight to [place] on 4/9 for family reasons, however given the urgency, and possible progression to bowel ischemia, would recommend emergency surgery. Patient understands and agrees to proceed.  plan  or for diagnostic possible ex lap    I have personally interviewed and examined this patient, reviewed pertinent labs and imaging, and discussed the case with colleagues, residents, and physician assistants on B Team rounds.    The active care issues are:  1. internal hernia    The Acute Care Surgery (B Team) Attending Group Practice:  Dr. Hillary Bruno, Dr. Quincy Gibson, Dr. Jean Mendoza, Dr. Humberto Rmoero,     urgent issues - spectra [id number]  nonurgent issues - [phone]  patient appointments or afterhours - [phone]

## 2023-03-22 NOTE — DISCHARGE NOTE PROVIDER - NSDCCPCAREPLAN_GEN_ALL_CORE_FT
PRINCIPAL DISCHARGE DIAGNOSIS  Diagnosis: Abdominal pain  Assessment and Plan of Treatment:   3/22/23: CT Abdomen / Pelvis performed showing internal hernia with pinching of the SMV and interval development of collateral venous drainage to the portal venous system in the setting of venous obstruction.  No small bowel obstruction or bowel ischemia is suggested at this time.  Increased hazy appearance to the central small bowel mesentery concerning for mesenteric edema.  Numerous prominent mesenteric lymph nodes again raises the possibility of lipoma and should be further worked up clinically.

## 2023-03-22 NOTE — PATIENT PROFILE ADULT - FALL HARM RISK - HARM RISK INTERVENTIONS

## 2023-03-22 NOTE — CHART NOTE - NSCHARTNOTEFT_GEN_A_CORE
Post Operative Note  Patient: PILO PALENCIA 58y (1965) Male   MRN: [id number]  Location: [place] [address] A      PROCEDURE: S/P  laparoscopic reduction of internal and closure of defect.      SUBJECTIVE: Patient seen and examined at the bedside.  Voided spontaneously after dang catheter was removed.  Does not endorse any complaints at this time.  Reports feeling hungry and requesting solid food.  Denies dizziness, headache, chest pain, palpitations, shortness of breath, abdominal pain, nausea, vomiting and diarrhea.      OBJECTIVE:    VITAL SIGNS:  T(F): 98.2 (03-22-23 @ 14:00), Max: 98.2 (03-22-23 @ 14:00)  HR: 71 (03-22-23 @ 14:00)  BP: 112/70 (03-22-23 @ 14:00) (103/65 - 118/65)  RR: 18 (03-22-23 @ 14:00)  SpO2: 100% (03-22-23 @ 14:00)       INS AND OUTS:  In:   03-22-23 @ 07:01  -  03-22-23 @ 16:07  --------------------------------------------------------  IN: 705 mL      IV Fluids:     Out:   03-22-23 @ 07:01  -  03-22-23 @ 16:07  --------------------------------------------------------  OUT: 900 mL      PHYSICAL EXAM:  GENERAL: Laying down, in no acute distress  RESPIRATORY: Normal expansion / effort.  CARDIOVASCULAR: S1 / S2.  Regular rate and rhythm.  ABDOMEN: Soft, Nontender, Nondistended.  Port sites clean, dry and intact.  MUSCULOSKELETAL: Moving all extremities spontaneously.      MEDICATIONS:  acetaminophen     Tablet .. 975 milliGRAM(s) Oral every 6 hours  atorvastatin 80 milliGRAM(s) Oral at bedtime  enoxaparin Injectable 40 milliGRAM(s) SubCutaneous every 24 hours  ibuprofen  Tablet. 400 milliGRAM(s) Oral every 6 hours  oxyCODONE    IR 2.5 milliGRAM(s) Oral every 6 hours PRN  oxyCODONE    IR 5 milliGRAM(s) Oral every 6 hours PRN  pantoprazole    Tablet 40 milliGRAM(s) Oral before breakfast  sucralfate 1 Gram(s) Oral four times a day  tamsulosin 0.8 milliGRAM(s) Oral at bedtime      LABS:    CBC:                      10.1   5.92  )-----------( 194      ( 22 Mar 2023 09:20 )             34.9     CHEM:  139  |  105  |  6<L>  ----------------------------<  139<H>  3.8   |  27  |  0.56    Ca    7.9<L>      22 Mar 2023 09:20  Phos  3.1     03-22  Mg     1.80     03-22    TPro  6.3  /  Alb  3.3  /  TBili  0.4  /  DBili  x   /  AST  50<H>  /  ALT  71<H>  /  AlkPhos  84  03-22    COAGULATION PANEL:  PT/INR - ( 22 Mar 2023 05:27 )   PT: 14.7 sec;   INR: 1.26 ratio    PTT - ( 22 Mar 2023 05:27 )  PTT:34.3 sec      ASSESSMENT:  Patient is a 58 year old male with a PMHx of HTN, paroxysmal AFib (on Xarelto), asthma, GERD, iron deficiency anemia and PSHx of gastric sleeve and Mohinder-en-Y gastric bypass (>10 years ago) and left inguinal hernia repair who presented to the ED with lower abdominal pain.  Patient is now S/P a laparoscopic reduction of internal and closure of defect on 3/22/23.      PLAN:  - Advance to regular diet for dinner  - Continue home medications  - Out of bed  - Pain control  - VTE prophylaxis with Lovenox subcutaneous      #[id number]  B Team Surgery

## 2023-03-22 NOTE — H&P ADULT - NSHPLABSRESULTS_GEN_ALL_CORE
Labs:                        10.1   .31  )-----------( 219      ( 21 Mar 2023 22:30 )             35.4           136  |  104  |  9   ----------------------------<  103<H>  3.9   |  24  |  0.69    Ca    8.0<L>      21 Mar 2023 22:30    TPro  6.5  /  Alb  3.2<L>  /  TBili  0.4  /  DBili  x   /  AST  59<H>  /  ALT  86<H>  /  AlkPhos  88      Urinalysis Basic - ( 21 Mar 2023 22:54 )    Color: Yellow / Appearance: Clear / S.019 / pH: x  Gluc: x / Ketone: Negative  / Bili: Negative / Urobili: <2 mg/dL   Blood: x / Protein: Trace / Nitrite: Negative   Leuk Esterase: Negative / RBC: 26 /HPF / WBC 3 /HPF   Sq Epi: x / Non Sq Epi: 1 /HPF / Bacteria: Negative    Imaging and other studies:  c< from: CT Abdomen and Pelvis w/ Oral Cont and w/ IV Cont (23 @ 01:31) >  INTERPRETATION:  CLINICAL INFORMATION: Sudden onset severe diffuse   abdominal pain.    COMPARISON: CT abdomen pelvis 3/7/2023    CONTRAST/COMPLICATIONS:  IV Contrast: Omnipaque 350  90 cc administered   10 cc discarded  Oral Contrast: Omnipaque 300   Fruit 2o  Complications: None reported at time of study completion    PROCEDURE:  CT of the Abdomen and Pelvis was performed.  Sagittal and coronal reformats were performed.    FINDINGS:  LOWER CHEST: Within normal limits.    LIVER: Unchanged atrophic left lobe  BILE DUCTS: Normal caliber.  GALLBLADDER: Within normal limits.  SPLEEN: Within normal limits.  PANCREAS: Withinnormal limits.  ADRENALS: Within normal limits.  KIDNEYS/URETERS: No hydronephrosis. Subcentimeter left hypoattenuating   lesion which is too small to characterize.    BLADDER: Within normal limits.  REPRODUCTIVE ORGANS: Prostate gland is normal in size.    BOWEL: Small hiatal hernia. Status gastric sleeve and antecolic Mohinder-en-Y   gastric bypass. No bowel obstruction, however, there is partial swirling   in the central small bowel mesentery where the SMV is patent. There has   been interval development of new collateral venous drainage to the portal   venous system in the setting of venous obstruction. Appendix is not   visualized. No evidence of inflammation in the pericecal region.  PERITONEUM: No ascites. Interval increase hazy appearanceto the central   small bowel mesentery concerning for mesenteric edema in the setting of   venous occlusion.  VESSELS: The native SMV is again pinched off secondary to internal   hernia. Interval development of collateral venous drainage to the portal   venous system. Patent mesenteric vessels.  RETROPERITONEUM/LYMPH NODES: Numerous prominent mesenteric lymph nodes   similar to the prior exam.  ABDOMINAL WALL: Within normal limits.  BONES: Degenerative changes.    IMPRESSION:  Internal hernia with pinching of the SMV and interval development of   collateral venous drainage to the portal venous system in the setting of   venous obstruction. No small bowel obstruction or bowel ischemia is   suggested at this time. Increased hazy appearance to the central small   bowel mesentery concerning for mesenteric edema. Urgent surgical   consultation is recommended.    Numerous prominent mesenteric lymph nodes again raises the possibility of   lipoma and should be further worked up clinically.    < end of copied text >

## 2023-03-22 NOTE — H&P ADULT - NSHPREVIEWOFSYSTEMS_GEN_ALL_CORE
The patient denies fever, chills; chest pain, SOB, palpitation; dizziness, weakness;  diarrhea, constipation;  bladder and bowel problems; leg swelling; sick contacts; and recent travel.

## 2023-03-22 NOTE — DISCHARGE NOTE PROVIDER - NPI NUMBER (FOR SYSADMIN USE ONLY) :
[[id number]] [[id number]],[[id number] [[id number]],[[id number]] [[id number]],[[id number]]

## 2023-03-22 NOTE — H&P ADULT - NSHPPHYSICALEXAM_GEN_ALL_CORE
Physical Exam  T(C): 36.7  HR: 87 (87 - 94)  BP: 118/65 (113/62 - 118/65)  RR: 16 (16 - 18)  SpO2: 100% (100% - 100%)  Tmax: T(C): , Max: 36.7 (03-21-23 @ 21:24)    General: well developed, well nourished, NAD  Neuro: alert and oriented, no focal deficits, moves all extremities spontaneously  HEENT: NCAT, EOMI, anicteric, mucosa moist  Respiratory: airway patent, respirations unlabored on RA  CVS: regular rate and rhythm  Abdomen: soft, nontender, nondistended  Extremities: no edema, sensation and movement grossly intact  Skin: warm, dry, appropriate color Physical Exam  T(C): 36.7  HR: 87 (87 - 94)  BP: 118/65 (113/62 - 118/65)  RR: 16 (16 - 18)  SpO2: 100% (100% - 100%)  Tmax: T(C): , Max: 36.7 (03-21-23 @ 21:24)    General: well developed, well nourished, NAD  Neuro: alert and oriented, no focal deficits, moves all extremities spontaneously  HEENT: NCAT, EOMI, anicteric, mucosa moist  Respiratory: airway patent, respirations unlabored on RA  CVS: regular rate and rhythm  Abdomen: soft, nondistended, mildly tender in the RLQ/lower abdomen, previous laparoscopic port side scars are well healed  Extremities: no edema, sensation and movement grossly intact  Skin: warm, dry, appropriate color

## 2023-03-22 NOTE — DISCHARGE NOTE PROVIDER - NSDCCPTREATMENT_GEN_ALL_CORE_FT
PRINCIPAL PROCEDURE  Procedure: Laparoscopic reduction of internal hernia  Findings and Treatment: 3/22/23      SECONDARY PROCEDURE  Procedure: Laparoscopic reduction of internal hernia  Findings and Treatment:

## 2023-03-22 NOTE — DISCHARGE NOTE PROVIDER - NSDCMRMEDTOKEN_GEN_ALL_CORE_FT
atorvastatin 80 mg oral tablet: 1 tab(s) orally once a day (at bedtime)  pantoprazole 40 mg oral delayed release tablet: 1 tab(s) orally once a day  sucralfate 1 g oral tablet: 1 tab(s) orally 4 times a day (before meals and at bedtime)  traZODone 50 mg oral tablet: 1 tab(s) orally once a day  Xarelto 20 mg oral tablet: 1 tab(s) orally once a day (in the evening) on 1/24/2023.    acetaminophen 325 mg oral tablet: 3 tab(s) orally every 6 hours  atorvastatin 80 mg oral tablet: 1 tab(s) orally once a day (at bedtime)  ibuprofen 400 mg oral tablet: 1 tab(s) orally every 6 hours  pantoprazole 40 mg oral delayed release tablet: 1 tab(s) orally once a day  sucralfate 1 g oral tablet: 1 tab(s) orally 4 times a day (before meals and at bedtime)  traZODone 50 mg oral tablet: 1 tab(s) orally once a day  Xarelto 20 mg oral tablet: 1 tab(s) orally once a day (in the evening) on 1/24/2023.    acetaminophen 325 mg oral tablet: 2 tab(s) orally every 6 hours AS NEEDED for pain.  ***DO NOT EXCEED 4,000MG IN A 24 HOUR PERIOD***  atorvastatin 80 mg oral tablet: 1 tab(s) orally once a day (at bedtime)  ibuprofen 400 mg oral tablet: 1 tab(s) orally every 6 hours AS NEEDED for pain.  pantoprazole 40 mg oral delayed release tablet: 1 tab(s) orally once a day  sucralfate 1 g oral tablet: 1 tab(s) orally 4 times a day (before meals and at bedtime)  traZODone 50 mg oral tablet: 1 tab(s) orally once a day  Xarelto 20 mg oral tablet: 1 tab(s) orally once a day (in the evening) on 1/24/2023.    acetaminophen 325 mg oral tablet: 2 tab(s) orally every 6 hours AS NEEDED for pain.  ***DO NOT EXCEED 4,000MG IN A 24 HOUR PERIOD***  atorvastatin 80 mg oral tablet: 1 tab(s) orally once a day (at bedtime)  ibuprofen 400 mg oral tablet: 1 tab(s) orally every 6 hours AS NEEDED for pain.  oxyCODONE 5 mg oral tablet: 1 tab(s) orally every 6 hours AS NEEDED for moderate to severe pain. MDD:4 tablets  pantoprazole 40 mg oral delayed release tablet: 1 tab(s) orally once a day  sucralfate 1 g oral tablet: 1 tab(s) orally 4 times a day (before meals and at bedtime)  traZODone 50 mg oral tablet: 1 tab(s) orally once a day  Xarelto 20 mg oral tablet: 1 tab(s) orally once a day (in the evening) on 1/24/2023.    acetaminophen 325 mg oral tablet: 2 tab(s) orally every 6 hours AS NEEDED for pain.  ***DO NOT EXCEED 4,000MG IN A 24 HOUR PERIOD***  atorvastatin 80 mg oral tablet: 1 tab(s) orally once a day (at bedtime)  oxyCODONE 5 mg oral tablet: 1 tab(s) orally every 6 hours AS NEEDED for moderate to severe pain. MDD:4 tablets  pantoprazole 40 mg oral delayed release tablet: 1 tab(s) orally once a day  sucralfate 1 g oral tablet: 1 tab(s) orally 4 times a day (before meals and at bedtime)  traZODone 50 mg oral tablet: 1 tab(s) orally once a day  Xarelto 20 mg oral tablet: 1 tab(s) orally once a day (in the evening) on 1/24/2023.

## 2023-03-22 NOTE — H&P ADULT - HISTORY OF PRESENT ILLNESS
GENERAL SURGERY H&P    HPI:  58M HTN, paroxysmal A-fib on Xarelto, asthma, remote gastric sleeve and Mohinder en Y gastric bypass >10 years ago, GERD, iron deficiency anemia presents for sudden onset lower abdominal pain that started approximately 6 PM today.  Patient reports pain is diffuse but was sudden onset suprapubic region. No clear inciting event. Denies any fever or chills.  Patient reports has not urinated since earlier this morning, at bedtime did not have any dysuria.  Does report recently in ED for similar symptoms and found to have lymphadenopathy which is pending outpatient work-up.  Does also report being found to have elevated PSA and had prostate biopsy on March 13.  Patient was taken off Xarelto prior to procedure and has since been restarted on it.  Reports approximately 1 week of clear but red urine with urination without any pain.  Denies chest pain, palpitations, shortness of breath.  Reported feeling nauseous and vomited initially with pain however denies any current nauseousness    PMHx: Asthma  - intubated x 2  - in 1996 - admitted in 2011 for asthma - on singulair , zyflo and advair    H/O: Obesity    Sleep Apnea has sleep studies in 2010  - was given CPAP machine but does not use it    Seasonal Allergies on singulair    DM (diabetes mellitus)    Diabetes mellitus    Hypertension    Asthma    SHARMIN (obstructive sleep apnea)    Pilonidal cyst with abscess    Lower back pain    GERD (gastroesophageal reflux disease)      PSHx: Inguinal Hernia right - many yrs ago    h/o  Sinus Surgery - amny yrs ago and septoplasty    History of Appendectomy    S/P tonsillectomy    S/P appendectomy    S/P hernia repair    S/P laparoscopic sleeve gastrectomy    S/P sinus surgery    H/O leukemia      Medications (inpatient):   Medications (PRN):  Allergies: Avelox (Unknown)  (Intolerances: )  Social Hx:   Family Hx: Family history of diabetes mellitus (DM)  mother - also CVA    Family history of cerebrovascular accident (CVA) in father    FH: leukemia  brother       GENERAL SURGERY H&P    HPI:  58M HTN, paroxysmal A-fib on Xarelto, asthma, GERD, iron deficiency anemia, PSHx of gastric sleeve and Mohinder en Y gastric bypass >10 years ago, left inguinal hernia repair, presenting to the ED with lower abdominal pain that started approximately 6 PM today.  Patient reports pain is diffuse but worst in the lower abdomen. Denies any fever or chills.  He recently visited the ED on 3/7/23 for similar symptoms and found to have lymphadenopathy which is pending outpatient work-up. Does also report being found to have elevated PSA and had prostate biopsy on March 13.  Patient was taken off Xarelto prior to procedure and has since been restarted on it.  Reports approximately 1 week of clear but red urine with urination without any pain.  Denies chest pain, palpitations, shortness of breath.  Reported feeling nauseous and vomited initially with pain however denies any current nauseousness    PMHx: Asthma  - intubated x 2  - in 1996 - admitted in 2011 for asthma - on singulair , zyflo and advair    H/O: Obesity    Sleep Apnea has sleep studies in 2010  - was given CPAP machine but does not use it    Seasonal Allergies on singulair    DM (diabetes mellitus)    Diabetes mellitus    Hypertension    Asthma    SHARMIN (obstructive sleep apnea)    Pilonidal cyst with abscess    Lower back pain    GERD (gastroesophageal reflux disease)      PSHx: Inguinal Hernia right - many yrs ago    h/o  Sinus Surgery - amny yrs ago and septoplasty    History of Appendectomy    S/P tonsillectomy    S/P appendectomy    S/P hernia repair    S/P laparoscopic sleeve gastrectomy    S/P sinus surgery    H/O leukemia      Medications (inpatient):   Medications (PRN):  Allergies: Avelox (Unknown)  (Intolerances: )  Social Hx:   Family Hx: Family history of diabetes mellitus (DM)  mother - also CVA    Family history of cerebrovascular accident (CVA) in father    FH: leukemia  brother       GENERAL SURGERY H&P    HPI:  58M HTN, paroxysmal A-fib on Xarelto, asthma, GERD, iron deficiency anemia, PSHx of gastric sleeve and Mohinder en Y gastric bypass >10 years ago, left inguinal hernia repair, presenting to the ED with lower abdominal pain that started approximately 6 PM today.  Patient reports pain is diffuse but worst in the lower abdomen. The pain comes and goes. He endorses nausea and 2 episodes of vomiting earlier today. Has been having bowel function, last BM was when he woke up this morning, has been passing flatus today as well. Denies any fever or chills.      Of note, patient is a poor historian. He sees a medical oncologist and believes he has prostate cancer. He recently visited the ED on 3/7/23 for similar pain and found to have lymphadenopathy which is pending outpatient work-up. Does also report being found to have elevated PSA and had prostate biopsy on March 13.  Patient was taken off Xarelto prior to procedure and has since been restarted on it.  He states he has a remote history of stroke and was on ASA81 and is now on Xarelto. Denies diarrhea, dysuria, chest pain, palpitations, shortness of breath.    In the ED, he is afebrile, nontachy, normotensive, states that his pain has improves, has no nausea, WBC 4.3, lactate 0.8, CT showing internal hernia.    PMHx: Asthma  - intubated x 2  - in 1996 - admitted in 2011 for asthma - on singulair , zyflo and advair    H/O: Obesity    Sleep Apnea has sleep studies in 2010  - was given CPAP machine but does not use it    Seasonal Allergies on singulair    DM (diabetes mellitus)    Diabetes mellitus    Hypertension    Asthma    SHARMIN (obstructive sleep apnea)    Pilonidal cyst with abscess    Lower back pain    GERD (gastroesophageal reflux disease)      PSHx: Inguinal Hernia right - many yrs ago    h/o  Sinus Surgery - amny yrs ago and septoplasty    History of Appendectomy    S/P tonsillectomy    S/P appendectomy    S/P hernia repair    S/P laparoscopic sleeve gastrectomy    S/P sinus surgery    H/O leukemia      Medications (inpatient):   Medications (PRN):  Allergies: Avelox (Unknown)  (Intolerances: )  Social Hx: Lives at home with wife  Family Hx: Family history of diabetes mellitus (DM)  mother - also CVA    Family history of cerebrovascular accident (CVA) in father    FH: leukemia  brother

## 2023-03-22 NOTE — DISCHARGE NOTE PROVIDER - CARE PROVIDERS DIRECT ADDRESSES
,[email or web address] [email or web address] ,[email or web address],[email or web address] ,[email or web address],DirectAddress_Unknown

## 2023-03-22 NOTE — DISCHARGE NOTE PROVIDER - PROVIDER TOKENS
PROVIDER:[TOKEN:[[id number]],FOLLOWUP:[1 week]] PROVIDER:[TOKEN:[[id number]],FOLLOWUP:[1 week]],PROVIDER:[TOKEN:[[id number]],FOLLOWUP:[1 week]] PROVIDER:[TOKEN:[[id number]],FOLLOWUP:[1 week]],PROVIDER:[TOKEN:[[id number]],FOLLOWUP:[1 week]],PROVIDER:[TOKEN:[[id number]],FOLLOWUP:[1 week]] PROVIDER:[TOKEN:[[id number]],FOLLOWUP:[1 week]],PROVIDER:[TOKEN:[[id number]],FOLLOWUP:[1 week]]

## 2023-03-22 NOTE — BRIEF OPERATIVE NOTE - OPERATION/FINDINGS
Laparoscopic reduction of internal and closure of defect.    Diagnostic laparoscopy. Mohinder en Y anatomy with common channel herniated through Forte defect. Bowel was reduced and was run with no signs of ischemia or obstruction. Anastomoses were intact. Defect was closed with running V-loc suture.

## 2023-03-22 NOTE — H&P ADULT - ASSESSMENT
ASSESSMENT  58M HTN, paroxysmal A-fib on Xarelto, asthma, remote gastric sleeve and Mohinder en Y gastric bypass >10 years ago, GERD, iron deficiency anemia presents internal hernia    PLAN  - Booked and consented for emergent diagnostic laparoscopy possible ex lap possible bowel resection possible ostomy

## 2023-03-22 NOTE — PROGRESS NOTE ADULT - ASSESSMENT
This is a 58 year old male with iron deficiency anemia who presents with abdominal pain.    1. Iron deficiency anemia   -- Receiving IV iron infusions outpatient, 2/5 doses received so far, with 3rd one planned for today   -- Consider IV Venofer 200mg x 1 dose while he remains admitted   -- Follow up with Dr. Rosales after discharge   -- Monitor CBC and transfuse to maintain hg >7    2. Abdominal pain  -- CT a/p shows internal hernia w/ pinching of SMV, and other findings concerning for mesenteric edema   -- s/p ex lap with hernia repair  -- Appreciate care per surgery       Amaya Sanchez PA-C  Hematology/Oncology  [facility]  [phone] (office)  [phone] (alt office)  Evenings and weekends please call MD on call or office

## 2023-03-23 ENCOUNTER — TRANSCRIPTION ENCOUNTER (OUTPATIENT)
Age: 58
End: 2023-03-23

## 2023-03-23 VITALS
HEART RATE: 74 BPM | DIASTOLIC BLOOD PRESSURE: 65 MMHG | OXYGEN SATURATION: 100 % | TEMPERATURE: 98 F | RESPIRATION RATE: 18 BRPM | SYSTOLIC BLOOD PRESSURE: 102 MMHG

## 2023-03-23 LAB
ANION GAP SERPL CALC-SCNC: 8 MMOL/L — SIGNIFICANT CHANGE UP (ref 7–14)
BUN SERPL-MCNC: 9 MG/DL — SIGNIFICANT CHANGE UP (ref 7–23)
CALCIUM SERPL-MCNC: 8.2 MG/DL — LOW (ref 8.4–10.5)
CHLORIDE SERPL-SCNC: 104 MMOL/L — SIGNIFICANT CHANGE UP (ref 98–107)
CO2 SERPL-SCNC: 25 MMOL/L — SIGNIFICANT CHANGE UP (ref 22–31)
CREAT SERPL-MCNC: 0.57 MG/DL — SIGNIFICANT CHANGE UP (ref 0.5–1.3)
EGFR: 112 ML/MIN/1.73M2 — SIGNIFICANT CHANGE UP
GLUCOSE SERPL-MCNC: 209 MG/DL — HIGH (ref 70–99)
HCT VFR BLD CALC: 33.1 % — LOW (ref 39–50)
HGB BLD-MCNC: 9.5 G/DL — LOW (ref 13–17)
MAGNESIUM SERPL-MCNC: 1.9 MG/DL — SIGNIFICANT CHANGE UP (ref 1.6–2.6)
MCHC RBC-ENTMCNC: 20.3 PG — LOW (ref 27–34)
MCHC RBC-ENTMCNC: 28.7 GM/DL — LOW (ref 32–36)
MCV RBC AUTO: 70.7 FL — LOW (ref 80–100)
NRBC # BLD: 0 /100 WBCS — SIGNIFICANT CHANGE UP (ref 0–0)
NRBC # FLD: 0 K/UL — SIGNIFICANT CHANGE UP (ref 0–0)
PHOSPHATE SERPL-MCNC: 2.8 MG/DL — SIGNIFICANT CHANGE UP (ref 2.5–4.5)
PLATELET # BLD AUTO: 217 K/UL — SIGNIFICANT CHANGE UP (ref 150–400)
POTASSIUM SERPL-MCNC: 4.4 MMOL/L — SIGNIFICANT CHANGE UP (ref 3.5–5.3)
POTASSIUM SERPL-SCNC: 4.4 MMOL/L — SIGNIFICANT CHANGE UP (ref 3.5–5.3)
RBC # BLD: 4.68 M/UL — SIGNIFICANT CHANGE UP (ref 4.2–5.8)
RBC # FLD: 20.9 % — HIGH (ref 10.3–14.5)
SODIUM SERPL-SCNC: 137 MMOL/L — SIGNIFICANT CHANGE UP (ref 135–145)
WBC # BLD: 5.9 K/UL — SIGNIFICANT CHANGE UP (ref 3.8–10.5)
WBC # FLD AUTO: 5.9 K/UL — SIGNIFICANT CHANGE UP (ref 3.8–10.5)

## 2023-03-23 RX ORDER — ACETAMINOPHEN 500 MG
2 TABLET ORAL
Qty: 0 | Refills: 0 | DISCHARGE
Start: 2023-03-23

## 2023-03-23 RX ORDER — IBUPROFEN 200 MG
1 TABLET ORAL
Qty: 0 | Refills: 0 | DISCHARGE
Start: 2023-03-23

## 2023-03-23 RX ORDER — ACETAMINOPHEN 500 MG
3 TABLET ORAL
Qty: 0 | Refills: 0 | DISCHARGE
Start: 2023-03-23

## 2023-03-23 RX ORDER — OXYCODONE HYDROCHLORIDE 5 MG/1
1 TABLET ORAL
Qty: 6 | Refills: 0
Start: 2023-03-23

## 2023-03-23 RX ADMIN — Medication 1 GRAM(S): at 05:04

## 2023-03-23 RX ADMIN — Medication 1 GRAM(S): at 00:59

## 2023-03-23 RX ADMIN — PANTOPRAZOLE SODIUM 40 MILLIGRAM(S): 20 TABLET, DELAYED RELEASE ORAL at 05:04

## 2023-03-23 RX ADMIN — Medication 975 MILLIGRAM(S): at 05:04

## 2023-03-23 RX ADMIN — Medication 975 MILLIGRAM(S): at 05:50

## 2023-03-23 RX ADMIN — Medication 975 MILLIGRAM(S): at 11:26

## 2023-03-23 NOTE — DISCHARGE NOTE NURSING/CASE MANAGEMENT/SOCIAL WORK - PATIENT PORTAL LINK FT
You can access the FollowMyHealth Patient Portal offered by [facility] by registering at the following website: [email or web address]. By joining [facility]’s FollowMyHealth portal, you will also be able to view your health information using other applications (apps) compatible with our system.

## 2023-03-23 NOTE — DISCHARGE NOTE NURSING/CASE MANAGEMENT/SOCIAL WORK - NSDPDISTO_GEN_ALL_CORE
Pt. is afebrile and offers no complaints. In no acute distress. Abdominal scope sites: clean, dry and intact. Pt is ambulating, tolerating diet well, and voiding in adequate amounts. + BM today/Home

## 2023-03-23 NOTE — PROGRESS NOTE ADULT - ASSESSMENT
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  CT Abdomen / Pelvis performed showing internal hernia with pinching of the SMV and interval development of collateral venous drainage to the portal venous system in the setting of venous obstruction.  No small bowel obstruction or bowel ischemia is suggested at this time.  Increased hazy appearance to the central small bowel mesentery concerning for mesenteric edema.  Numerous prominent mesenteric lymph nodes again raises the possibility of lipoma and should be further worked up clinically.  Patient is now S/P a laparoscopic reduction of internal hernia and closure of defect on 3/22/23.      PLAN:  - Regular diet  - Out of bed  - Pain control  - VTE prophylaxis with Lovenox subcutaneous  - Discharge planning home today      #[id number]  B Team Surgery Patient is a 58 year old male with a PMHx of HTN, paroxysmal AFib (on Xarelto), asthma, GERD, iron deficiency anemia and PSHx of gastric sleeve and Mohinder-en-Y gastric bypass (>10 years ago) and left inguinal hernia repair who presented to the ED with lower abdominal pain.  CT Abdomen / Pelvis performed showing internal hernia with pinching of the SMV and interval development of collateral venous drainage to the portal venous system in the setting of venous obstruction.  No small bowel obstruction or bowel ischemia is suggested at this time.  Increased hazy appearance to the central small bowel mesentery concerning for mesenteric edema.  Numerous prominent mesenteric lymph nodes again raises the possibility of lipoma and should be further worked up clinically.  Patient is now S/P a laparoscopic reduction of internal hernia and closure of defect on 3/22/23.      PLAN:  - Regular diet  - Out of bed  - Pain control  - VTE prophylaxis with Lovenox subcutaneous  - Discharge planning home today  - Resume home Xarelto on discharge (patient aware and confirms he ha active prescription)      #[id number]  B Team Surgery

## 2023-03-23 NOTE — DISCHARGE NOTE NURSING/CASE MANAGEMENT/SOCIAL WORK - NSDCPNINST_GEN_ALL_CORE
Make a follow up appointment with Dr. Rmoero. Call MD if you develop a fever, or if there is redness, swelling, drainage or pain not relieved by pain medication. No heavy lifting, bending, or straining to move your bowels. Take over the counter stool softeners as needed to prevent constipation which may be caused by pain medication.  Make a follow up appointment with Dr. Romero. Call MD if you develop a fever, or if there is redness, swelling, drainage or pain not relieved by pain medication. No heavy lifting, bending, or straining to move your bowels. Take over the counter stool softeners as needed to prevent constipation which may be caused by pain medication. Your A1C= 7.1. Follow up with your endocrinologist for better diabetes management.

## 2023-03-23 NOTE — PROGRESS NOTE ADULT - SUBJECTIVE AND OBJECTIVE BOX
Surgery Daily Progress Note  =====================================================  Interval / Overnight Events: No acute events overnight.      HPI:  Patient is a 58 year old male with a PMHx of HTN, paroxysmal AFib (on Xarelto), asthma, GERD, iron deficiency anemia and PSHx of gastric sleeve and Mohinder-en-Y gastric bypass (>10 years ago) and left inguinal hernia repair who presented to the ED with lower abdominal pain.      PAST MEDICAL & SURGICAL HISTORY:  H/O: Obesity  s/p gastric sleeve in 2015, lost ~75 lbs  DM (diabetes mellitus)  resolved with weight loss surgery  Hypertension  on diet control  Asthma  intubated x 2 in 1992, hospitalized in 2011, currently stable, last used rescue inhaler 7 months ago  SHARMIN (obstructive sleep apnea)  resolved after weight loss surgery  Pilonidal cyst with abscess  had surgery  Lower back pain  GERD (gastroesophageal reflux disease)  S/P tonsillectomy  S/P appendectomy  S/P hernia repair  bilateral inguinal hernia repair  S/P laparoscopic sleeve gastrectomy  2015  S/P sinus surgery  with septoplasty      ALLERGIES:  Avelox (Unknown)    --------------------------------------------------------------------------------------    MEDICATIONS:    Neurologic Medications  acetaminophen     Tablet .. 975 milliGRAM(s) Oral every 6 hours  ibuprofen  Tablet. 400 milliGRAM(s) Oral every 6 hours  oxyCODONE    IR 2.5 milliGRAM(s) Oral every 6 hours PRN Moderate Pain (4 - 6)  oxyCODONE    IR 5 milliGRAM(s) Oral every 6 hours PRN Severe Pain (7 - 10)    Gastrointestinal Medications  pantoprazole    Tablet 40 milliGRAM(s) Oral before breakfast  sucralfate 1 Gram(s) Oral four times a day    Genitourinary Medications  tamsulosin 0.8 milliGRAM(s) Oral at bedtime    Hematologic/Oncologic Medications  enoxaparin Injectable 40 milliGRAM(s) SubCutaneous every 24 hours    Endocrine/Metabolic Medications  atorvastatin 80 milliGRAM(s) Oral at bedtime    --------------------------------------------------------------------------------------    VITAL SIGNS:  T(C): 36.4 (23 Mar 2023 05:04), Max: 36.8 (22 Mar 2023 14:00)  T(F): 97.5 (23 Mar 2023 05:04), Max: 98.2 (22 Mar 2023 14:00)  HR: 75 (23 Mar 2023 05:04) (69 - 84)  BP: 105/58 (23 Mar 2023 05:04) (103/65 - 136/82)  BP(mean): 73 (22 Mar 2023 10:00) (70 - 81)  RR: 18 (23 Mar 2023 05:04) (12 - 18)  SpO2: 100% (23 Mar 2023 05:04) (96% - 100%)    --------------------------------------------------------------------------------------    INS AND OUTS:    22 Mar 2023 07:01  -  23 Mar 2023 07:00  --------------------------------------------------------  IN:    Lactated Ringers: 225 mL    Oral Fluid: 480 mL  Total IN: 705 mL    OUT:    Voided (mL): 1100 mL  Total OUT: 1100 mL    Total NET: -395 mL    --------------------------------------------------------------------------------------    EXAM    NEUROLOGY    Exam: Normal, in no acute distress.    HEENT  Exam: Normocephalic, atraumatic.    RESPIRATORY  Exam: Normal expansion / effort.    CARDIOVASCULAR  Exam: S1, S2.  Regular rate and rhythm.    GI/NUTRITION  Exam: Abdomen soft, Non-tender, Non-distended.  Port sites clean, dry and intact.  Current Diet: Regular diet    MUSCULOSKELETAL  Exam: All extremities moving spontaneously without limitations.      HEMATOLOGIC  [x] VTE Prophylaxis: enoxaparin Injectable 40 milliGRAM(s) SubCutaneous every 24 hours    --------------------------------------------------------------------------------------
Patient is a 58y old  Male who presents with a chief complaint of Internal hernia (22 Mar 2023 13:36)    This is a 58 year old male who is under care of Dr. Holli Rosales of [facility] for the management of iron deficiency anemia. He was started on weekly Venofer on 03/08, has received 2 infusions so far and due for third one today, with plans to complete total of 5 treatments. He presents here with abdominal pain, now s/p internal hernia repair.   Patient seen this afternoon. He reports feeling pretty well overall. Hoping to get his IV iron inpatient prior to discharge since he was due for it outpatient today anyways.     MEDICATIONS  (STANDING):  acetaminophen     Tablet .. 975 milliGRAM(s) Oral every 6 hours  atorvastatin 80 milliGRAM(s) Oral at bedtime  enoxaparin Injectable 40 milliGRAM(s) SubCutaneous every 24 hours  ibuprofen  Tablet. 400 milliGRAM(s) Oral every 6 hours  lactated ringers. 1000 milliLiter(s) (75 mL/Hr) IV Continuous <Continuous>  pantoprazole    Tablet 40 milliGRAM(s) Oral before breakfast  sucralfate 1 Gram(s) Oral four times a day  tamsulosin 0.8 milliGRAM(s) Oral at bedtime    MEDICATIONS  (PRN):  oxyCODONE    IR 2.5 milliGRAM(s) Oral every 6 hours PRN Moderate Pain (4 - 6)  oxyCODONE    IR 5 milliGRAM(s) Oral every 6 hours PRN Severe Pain (7 - 10)      ROS  No fever, sweats, chills  No epistaxis, HA, sore throat  No CP, SOB, cough, sputum  +abd pain   No edema  No rash  No anxiety  No back pain, joint pain  No bleeding, bruising  No dysuria, hematuria    Vital Signs Last 24 Hrs  T(C): 36.6 (22 Mar 2023 10:35), Max: 36.7 (21 Mar 2023 21:24)  T(F): 97.8 (22 Mar 2023 10:35), Max: 98.1 (21 Mar 2023 22:57)  HR: 76 (22 Mar 2023 10:35) (61 - 94)  BP: 115/68 (22 Mar 2023 10:35) (103/65 - 118/65)  BP(mean): 73 (22 Mar 2023 10:00) (70 - 81)  RR: 18 (22 Mar 2023 10:35) (12 - 19)  SpO2: 98% (22 Mar 2023 10:35) (96% - 100%)    Parameters below as of 22 Mar 2023 10:35  Patient On (Oxygen Delivery Method): room air        PE  NAD  Awake, alert  Anicteric, MMM  Abd soft, NT, ND  No c/c/e  No rash grossly  FROM                          10.1   5.92  )-----------( 194      ( 22 Mar 2023 09:20 )             34.9       03-22    139  |  105  |  6<L>  ----------------------------<  139<H>  3.8   |  27  |  0.56    Ca    7.9<L>      22 Mar 2023 09:20  Phos  3.1     03-22  Mg     1.80     03-22    TPro  6.3  /  Alb  3.3  /  TBili  0.4  /  DBili  x   /  AST  50<H>  /  ALT  71<H>  /  AlkPhos  84  03-22      ACC: [id number] EXAM:  CT ABDOMEN AND PELVIS OC IC   ORDERED BY: PEGGY WEBB     PROCEDURE DATE:  03/22/2023          INTERPRETATION:  CLINICAL INFORMATION: Sudden onset severe diffuse   abdominal pain.    COMPARISON: CT abdomen pelvis 3/7/2023    CONTRAST/COMPLICATIONS:  IV Contrast: Omnipaque 350  90 cc administered   10 cc discarded  Oral Contrast: Omnipaque 300   Fruit 2o  Complications: None reported at time of study completion    PROCEDURE:  CT of the Abdomen and Pelvis was performed.  Sagittal and coronal reformats were performed.    FINDINGS:  LOWER CHEST: Within normal limits.    LIVER: Unchanged atrophic left lobe  BILE DUCTS: Normal caliber.  GALLBLADDER: Within normal limits.  SPLEEN: Within normal limits.  PANCREAS: Within normal limits.  ADRENALS: Within normal limits.  KIDNEYS/URETERS: No hydronephrosis. Subcentimeter left hypoattenuating   lesion which is too small to characterize.    BLADDER: Within normal limits.  REPRODUCTIVE ORGANS: Prostate gland is normal in size.    BOWEL: Small hiatal hernia. Status gastric sleeve and antecolic Mohinder-en-Y   gastric bypass. No bowel obstruction, however, there is partial swirling   in the central small bowel mesentery where the SMV is patent. There has   been interval development of new collateral venous drainage to the portal   venous system in the setting of venous obstruction. Appendix is not   visualized. No evidence of inflammation in the pericecal region.  PERITONEUM: No ascites. Interval increase hazy appearance to the central   small bowel mesentery concerning for mesenteric edema in the setting of   venous occlusion.  VESSELS: The native SMV is again pinched off secondary to internal   hernia. Interval development of collateral venous drainage to the portal   venous system. Patent mesenteric vessels.  RETROPERITONEUM/LYMPH NODES: Numerous prominent mesenteric lymph nodes   similar to the prior exam.  ABDOMINAL WALL: Within normal limits.  BONES: Degenerative changes.    IMPRESSION:  Internal hernia with pinching of the SMV and interval development of   collateral venous drainage to the portal venous system in the setting of   venous obstruction. No small bowel obstruction or bowel ischemia is   suggested at this time. Increased hazy appearance to the central small   bowel mesentery concerning for mesenteric edema. Urgent surgical   consultation is recommended.    Numerous prominent mesenteric lymph nodes again raises the possibility of   lipoma and should be further worked up clinically.    These findings were discussed with Dr. Barrett at 3:55 am on 3/22/2023   by Dr. Leon with read back verification.    --- End of Report ---  
ANESTHESIA POSTOP CHECK    58y Male POSTOP DAY 1 S/P     Vital Signs Last 24 Hrs  T(C): 36.7 (23 Mar 2023 09:26), Max: 36.8 (22 Mar 2023 14:00)  T(F): 98.1 (23 Mar 2023 09:26), Max: 98.2 (22 Mar 2023 14:00)  HR: 74 (23 Mar 2023 09:26) (69 - 75)  BP: 102/65 (23 Mar 2023 09:26) (102/65 - 136/82)  BP(mean): --  RR: 18 (23 Mar 2023 09:26) (16 - 18)  SpO2: 100% (23 Mar 2023 09:26) (98% - 100%)    Parameters below as of 23 Mar 2023 09:26  Patient On (Oxygen Delivery Method): room air      I&O's Summary    22 Mar 2023 07:01  -  23 Mar 2023 07:00  --------------------------------------------------------  IN: 705 mL / OUT: 1100 mL / NET: -395 mL        [X ] NO ANESTHESIA COMPLICATIONS      Comments: 
Patient is a 58y old  Male who presents with a chief complaint of Internal hernia (23 Mar 2023 10:51)    Date of servie : 23 @ 13:52  INTERVAL HPI/OVERNIGHT EVENTS:  T(C): 36.7 (23 @ 09:26), Max: 36.8 (23 @ 14:00)  HR: 74 (23 @ 09:26) (69 - 75)  BP: 102/65 (23 @ 09:26) (102/65 - 136/82)  RR: 18 (23 @ 09:26) (16 - 18)  SpO2: 100% (23 @ 09:26) (98% - 100%)  Wt(kg): --  I&O's Summary    22 Mar 2023 07:01  -  23 Mar 2023 07:00  --------------------------------------------------------  IN: 705 mL / OUT: 1100 mL / NET: -395 mL        LABS:                        9.5    5.90  )-----------( 217      ( 23 Mar 2023 06:20 )             33.1         137  |  104  |  9   ----------------------------<  209<H>  4.4   |  25  |  0.57    Ca    8.2<L>      23 Mar 2023 06:20  Phos  2.8       Mg     1.90         TPro  6.3  /  Alb  3.3  /  TBili  0.4  /  DBili  x   /  AST  50<H>  /  ALT  71<H>  /  AlkPhos  84  22    PT/INR - ( 22 Mar 2023 05:27 )   PT: 14.7 sec;   INR: 1.26 ratio         PTT - ( 22 Mar 2023 05:27 )  PTT:34.3 sec  Urinalysis Basic - ( 21 Mar 2023 22:54 )    Color: Yellow / Appearance: Clear / S.019 / pH: x  Gluc: x / Ketone: Negative  / Bili: Negative / Urobili: <2 mg/dL   Blood: x / Protein: Trace / Nitrite: Negative   Leuk Esterase: Negative / RBC: 26 /HPF / WBC 3 /HPF   Sq Epi: x / Non Sq Epi: 1 /HPF / Bacteria: Negative      CAPILLARY BLOOD GLUCOSE            Urinalysis Basic - ( 21 Mar 2023 22:54 )    Color: Yellow / Appearance: Clear / S.019 / pH: x  Gluc: x / Ketone: Negative  / Bili: Negative / Urobili: <2 mg/dL   Blood: x / Protein: Trace / Nitrite: Negative   Leuk Esterase: Negative / RBC: 26 /HPF / WBC 3 /HPF   Sq Epi: x / Non Sq Epi: 1 /HPF / Bacteria: Negative        MEDICATIONS  (STANDING):  acetaminophen     Tablet .. 975 milliGRAM(s) Oral every 6 hours  atorvastatin 80 milliGRAM(s) Oral at bedtime  enoxaparin Injectable 40 milliGRAM(s) SubCutaneous every 24 hours  ibuprofen  Tablet. 400 milliGRAM(s) Oral every 6 hours  pantoprazole    Tablet 40 milliGRAM(s) Oral before breakfast  sucralfate 1 Gram(s) Oral four times a day  tamsulosin 0.8 milliGRAM(s) Oral at bedtime    MEDICATIONS  (PRN):  oxyCODONE    IR 2.5 milliGRAM(s) Oral every 6 hours PRN Moderate Pain (4 - 6)  oxyCODONE    IR 5 milliGRAM(s) Oral every 6 hours PRN Severe Pain (7 - 10)          PHYSICAL EXAM:  GENERAL: NAD, well-groomed, well-developed  HEAD:  Atraumatic, Normocephalic  CHEST/LUNG: Clear to percussion bilaterally; No rales, rhonchi, wheezing, or rubs  HEART: Regular rate and rhythm; No murmurs, rubs, or gallops  ABDOMEN: Soft, Nontender, Nondistended; Bowel sounds present  EXTREMITIES:  2+ Peripheral Pulses, No clubbing, cyanosis, or edema  LYMPH: No lymphadenopathy noted  SKIN: No rashes or lesions    Care Discussed with Consultants/Other Providers [ ] YES  [ ] NO

## 2023-03-23 NOTE — PROGRESS NOTE ADULT - ASSESSMENT
58M HTN, paroxysmal A-fib on Xarelto, asthma, remote gastric sleeve and Mohinder en Y gastric bypass >10 years ago, GERD, iron deficiency anemia presents internal hernia    1 HERNIA  - sp repair  - tolerating diet  - pain control  - dc today    2 Afib  - cw xarelto    dc planning

## 2023-03-27 PROCEDURE — 76775 US EXAM ABDO BACK WALL LIM: CPT | Mod: 26

## 2023-03-28 NOTE — H&P ADULT - NSICDXPASTMEDICALHX_GEN_ALL_CORE_FT
PAST MEDICAL HISTORY:  Asthma intubated x 2 in 1992, hospitalized in 2011, currently stable, last used rescue inhaler 7 months ago    DM (diabetes mellitus) resolved with weight loss surgery    GERD (gastroesophageal reflux disease)     H/O: Obesity s/p gastric sleeve in 2015, lost ~75 lbs    Hypertension on diet control    Lower back pain     SHARMIN (obstructive sleep apnea) resolved after weight loss surgery    Pilonidal cyst with abscess had surgery    
Normal rate, regular rhythm.  Heart sounds S1, S2.  No murmurs, rubs or gallops.

## 2023-08-16 NOTE — PATIENT PROFILE ADULT - NSPROGENOTHERPROVIDER_GEN_A_NUR
"Patient needs an updated power wheelchair order for [employer] [place]. The Rx has to say \"power mobility device\", list a mobility related diagnosis, and write \"current chair unrepairable.\" Fax to [phone]."
none

## 2023-08-29 ENCOUNTER — APPOINTMENT (OUTPATIENT)
Dept: SURGERY | Facility: HOSPITAL | Age: 58
End: 2023-08-29
Payer: COMMERCIAL

## 2023-08-29 VITALS
TEMPERATURE: 98.2 F | BODY MASS INDEX: 28.35 KG/M2 | HEIGHT: 63 IN | DIASTOLIC BLOOD PRESSURE: 73 MMHG | WEIGHT: 160 LBS | HEART RATE: 87 BPM | SYSTOLIC BLOOD PRESSURE: 118 MMHG

## 2023-08-29 DIAGNOSIS — K64.2 THIRD DEGREE HEMORRHOIDS: ICD-10-CM

## 2023-08-29 PROCEDURE — 99213 OFFICE O/P EST LOW 20 MIN: CPT

## 2023-08-29 NOTE — HISTORY OF PRESENT ILLNESS
[de-identified] : Patient with history of agnieszka en y gastric bypass with over 100 lbs of weight loss, s/p lap reduction of tracy hernia defect. Reports has intermittently had abdominal pain in right side of abdomen followed by urge to defecate. He reports a known internal hemorrhoid that has been bothering him as well, however his abdominal pain has limited his daily function. He reports low appetite and some weight loss over last month. On exam abdomen is benign however tender on right side of abdomen, previous laparoscopic scars are well healed.

## 2023-08-29 NOTE — ASSESSMENT
[FreeTextEntry1] : recovered well after surgery for lap reduction of internal hernia however with persistent right sided abdominal pain

## 2023-08-29 NOTE — PLAN
[FreeTextEntry1] : requires bariatric surgery f/u, f/u with Dr. Benson as outpatient consider ct scan abd pelvis po and iv contrast f/u colorectal surgery for internal hemorrhoid

## 2023-08-30 PROBLEM — K64.2 GRADE III HEMORRHOIDS: Status: ACTIVE | Noted: 2023-08-30

## 2023-09-01 DIAGNOSIS — Z13.21 ENCOUNTER FOR SCREENING FOR NUTRITIONAL DISORDER: ICD-10-CM

## 2023-09-01 DIAGNOSIS — Z01.811 ENCOUNTER FOR PREPROCEDURAL RESPIRATORY EXAMINATION: ICD-10-CM

## 2023-09-01 DIAGNOSIS — Z98.84 BARIATRIC SURGERY STATUS: ICD-10-CM

## 2023-09-01 DIAGNOSIS — Z13.29 ENCOUNTER FOR SCREENING FOR OTHER SUSPECTED ENDOCRINE DISORDER: ICD-10-CM

## 2023-09-01 DIAGNOSIS — Z13.21 ENCOUNTER FOR SCREENING FOR OTHER SUSPECTED ENDOCRINE DISORDER: ICD-10-CM

## 2023-09-01 DIAGNOSIS — R10.9 UNSPECIFIED ABDOMINAL PAIN: ICD-10-CM

## 2023-09-01 DIAGNOSIS — Z13.228 ENCOUNTER FOR SCREENING FOR OTHER SUSPECTED ENDOCRINE DISORDER: ICD-10-CM

## 2023-09-01 DIAGNOSIS — Z13.0 ENCOUNTER FOR SCREENING FOR OTHER SUSPECTED ENDOCRINE DISORDER: ICD-10-CM

## 2023-09-05 ENCOUNTER — APPOINTMENT (OUTPATIENT)
Dept: ULTRASOUND IMAGING | Facility: IMAGING CENTER | Age: 58
End: 2023-09-05
Payer: COMMERCIAL

## 2023-09-05 ENCOUNTER — OUTPATIENT (OUTPATIENT)
Dept: OUTPATIENT SERVICES | Facility: HOSPITAL | Age: 58
LOS: 1 days | End: 2023-09-05
Payer: COMMERCIAL

## 2023-09-05 ENCOUNTER — APPOINTMENT (OUTPATIENT)
Dept: SURGERY | Facility: CLINIC | Age: 58
End: 2023-09-05
Payer: COMMERCIAL

## 2023-09-05 VITALS
TEMPERATURE: 97.3 F | HEART RATE: 69 BPM | BODY MASS INDEX: 27.82 KG/M2 | RESPIRATION RATE: 17 BRPM | SYSTOLIC BLOOD PRESSURE: 100 MMHG | OXYGEN SATURATION: 99 % | WEIGHT: 157 LBS | HEIGHT: 63 IN | DIASTOLIC BLOOD PRESSURE: 65 MMHG

## 2023-09-05 DIAGNOSIS — Z90.49 ACQUIRED ABSENCE OF OTHER SPECIFIED PARTS OF DIGESTIVE TRACT: Chronic | ICD-10-CM

## 2023-09-05 DIAGNOSIS — Z98.89 OTHER SPECIFIED POSTPROCEDURAL STATES: Chronic | ICD-10-CM

## 2023-09-05 DIAGNOSIS — R10.9 UNSPECIFIED ABDOMINAL PAIN: ICD-10-CM

## 2023-09-05 DIAGNOSIS — Z98.84 BARIATRIC SURGERY STATUS: Chronic | ICD-10-CM

## 2023-09-05 DIAGNOSIS — R10.11 RIGHT UPPER QUADRANT PAIN: ICD-10-CM

## 2023-09-05 DIAGNOSIS — Z90.89 ACQUIRED ABSENCE OF OTHER ORGANS: Chronic | ICD-10-CM

## 2023-09-05 DIAGNOSIS — Z98.84 BARIATRIC SURGERY STATUS: ICD-10-CM

## 2023-09-05 DIAGNOSIS — G89.29 RIGHT UPPER QUADRANT PAIN: ICD-10-CM

## 2023-09-05 PROCEDURE — 76700 US EXAM ABDOM COMPLETE: CPT | Mod: 26

## 2023-09-05 PROCEDURE — 76700 US EXAM ABDOM COMPLETE: CPT

## 2023-09-05 PROCEDURE — 99214 OFFICE O/P EST MOD 30 MIN: CPT

## 2023-09-05 NOTE — PHYSICAL EXAM
[TextEntry] : Gen.: Patient is alert and oriented and in no acute distress. Eye: Pupils are equal round and reactive; extraocular muscles are intact. HEENT: Normocephalic atraumatic. Thyro-cervical exam: Trachea is midline.  There is no obvious thyromegaly or cervical adenopathy. Pulmonary: Patient moves air well. Cardiovascular: Perfusion appears good. Abdomen: The abdomen is soft,nondistended. There is no obvious hernia.  He is tender on palpation in his mid right upper quadrant in the subcostal region. Musculoskeletal: There is no obvious musculoskeletal deformity. Neurologic: There are no focal neurologic findings. Psychiatric: The patient is appropriate with a normal affect.  Skin:  The skin is warm, dry and intact.

## 2023-09-05 NOTE — REVIEW OF SYSTEMS
[Recent Change In Weight] : ~T recent weight change [Lower Ext Edema] : lower extremity edema [Abdominal Pain] : abdominal pain [Diarrhea] : diarrhea [Joint Pain] : joint pain [Negative] : Allergic/Immunologic

## 2023-09-05 NOTE — ASSESSMENT
[FreeTextEntry1] : This patient has a history of gastric bypass with both marginal ulcer and internal hernia.  He is having new right-sided abdominal pain.  Think it is worth evaluating both his gallbladder as well as potential persistent marginal ulcer.  We will do this with an ultrasound and an EGD.  I also think he has not had follow-up labs recently for his bariatric surgery and nutrition labs would be appropriate.

## 2023-09-05 NOTE — HISTORY OF PRESENT ILLNESS
[de-identified] : Mr. PILO PALENCIA is a 58-year-old man with past medical history of HTN, HLD, stomach ulcers; past surgical history of gastric bypass done 2015 with Dr. Lafleur & repair of internal hernia done 3/22/2023. Here today referred by Dr. Romero for persistent right sided abdominal pain.  He also has a history of marginal ulcer.  He relates that he is having right upper quadrant pain that is intermittent.  It is relatively short-lived but happens several times a week several times a day.  He is unclear what exacerbates the pain or what he can do to improve it.  He states it may be similar to his ulcer pain but is very different from his internal hernia pain in the past.

## 2023-09-06 LAB
25(OH)D3 SERPL-MCNC: <6 NG/ML
ALBUMIN SERPL ELPH-MCNC: 3.8 G/DL
ALP BLD-CCNC: 85 U/L
ALT SERPL-CCNC: 22 U/L
ANION GAP SERPL CALC-SCNC: 8 MMOL/L
AST SERPL-CCNC: 21 U/L
BILIRUB SERPL-MCNC: 0.6 MG/DL
BUN SERPL-MCNC: 6 MG/DL
CALCIUM SERPL-MCNC: 8.8 MG/DL
CHLORIDE SERPL-SCNC: 105 MMOL/L
CHOLEST SERPL-MCNC: 193 MG/DL
CO2 SERPL-SCNC: 29 MMOL/L
CREAT SERPL-MCNC: 0.64 MG/DL
EGFR: 108 ML/MIN/1.73M2
ESTIMATED AVERAGE GLUCOSE: 128 MG/DL
FERRITIN SERPL-MCNC: 59 NG/ML
FOLATE SERPL-MCNC: 11 NG/ML
GLUCOSE SERPL-MCNC: 94 MG/DL
HBA1C MFR BLD HPLC: 6.1 %
HCT VFR BLD CALC: 43.9 %
HDLC SERPL-MCNC: 51 MG/DL
HGB BLD-MCNC: 14.5 G/DL
INSULIN SERPL-MCNC: 3 UU/ML
LDLC SERPL CALC-MCNC: 129 MG/DL
MCHC RBC-ENTMCNC: 28.5 PG
MCHC RBC-ENTMCNC: 33 GM/DL
MCV RBC AUTO: 86.4 FL
NONHDLC SERPL-MCNC: 142 MG/DL
PLATELET # BLD AUTO: 190 K/UL
POTASSIUM SERPL-SCNC: 4.6 MMOL/L
PROT SERPL-MCNC: 6.4 G/DL
RBC # BLD: 5.08 M/UL
RBC # FLD: 14.7 %
SODIUM SERPL-SCNC: 142 MMOL/L
T4 FREE SERPL-MCNC: 1.5 NG/DL
TRIGL SERPL-MCNC: 66 MG/DL
TSH SERPL-ACNC: 0.73 UIU/ML
VIT B12 SERPL-MCNC: 516 PG/ML
WBC # FLD AUTO: 4.58 K/UL

## 2023-09-11 LAB
A-TOCOPHEROL VIT E SERPL-MCNC: 7.1 MG/L
BETA+GAMMA TOCOPHEROL SERPL-MCNC: 1.2 MG/L
MENADIONE SERPL-MCNC: 0.12 NG/ML
VIT A SERPL-MCNC: 18 UG/DL
VIT B1 SERPL-MCNC: 91.2 NMOL/L

## 2023-09-12 ENCOUNTER — APPOINTMENT (OUTPATIENT)
Dept: COLORECTAL SURGERY | Facility: CLINIC | Age: 58
End: 2023-09-12
Payer: COMMERCIAL

## 2023-09-12 VITALS
HEART RATE: 64 BPM | HEIGHT: 63 IN | DIASTOLIC BLOOD PRESSURE: 62 MMHG | OXYGEN SATURATION: 98 % | RESPIRATION RATE: 16 BRPM | BODY MASS INDEX: 27.64 KG/M2 | TEMPERATURE: 98 F | WEIGHT: 156 LBS | SYSTOLIC BLOOD PRESSURE: 93 MMHG

## 2023-09-12 DIAGNOSIS — K62.89 OTHER SPECIFIED DISEASES OF ANUS AND RECTUM: ICD-10-CM

## 2023-09-12 DIAGNOSIS — K64.5 PERIANAL VENOUS THROMBOSIS: ICD-10-CM

## 2023-09-12 PROCEDURE — 46600 DIAGNOSTIC ANOSCOPY SPX: CPT

## 2023-09-12 PROCEDURE — 99243 OFF/OP CNSLTJ NEW/EST LOW 30: CPT | Mod: 25

## 2023-09-12 RX ORDER — ERGOCALCIFEROL 1.25 MG/1
1.25 MG CAPSULE, LIQUID FILLED ORAL
Qty: 12 | Refills: 3 | Status: DISCONTINUED | COMMUNITY
Start: 2023-09-06 | End: 2023-09-12

## 2023-09-25 ENCOUNTER — EMERGENCY (EMERGENCY)
Facility: HOSPITAL | Age: 58
LOS: 1 days | Discharge: HOME | End: 2023-09-25
Admitting: EMERGENCY MEDICINE
Payer: COMMERCIAL

## 2023-09-25 VITALS
TEMPERATURE: 98 F | OXYGEN SATURATION: 98 % | HEART RATE: 81 BPM | SYSTOLIC BLOOD PRESSURE: 116 MMHG | RESPIRATION RATE: 18 BRPM | DIASTOLIC BLOOD PRESSURE: 82 MMHG

## 2023-09-25 DIAGNOSIS — Z98.89 OTHER SPECIFIED POSTPROCEDURAL STATES: Chronic | ICD-10-CM

## 2023-09-25 DIAGNOSIS — Z98.84 BARIATRIC SURGERY STATUS: Chronic | ICD-10-CM

## 2023-09-25 DIAGNOSIS — Z98.890 OTHER SPECIFIED POSTPROCEDURAL STATES: Chronic | ICD-10-CM

## 2023-09-25 DIAGNOSIS — Z90.49 ACQUIRED ABSENCE OF OTHER SPECIFIED PARTS OF DIGESTIVE TRACT: Chronic | ICD-10-CM

## 2023-09-25 DIAGNOSIS — Z90.89 ACQUIRED ABSENCE OF OTHER ORGANS: Chronic | ICD-10-CM

## 2023-09-25 PROCEDURE — 73564 X-RAY EXAM KNEE 4 OR MORE: CPT | Mod: 26,LT

## 2023-09-25 PROCEDURE — 99284 EMERGENCY DEPT VISIT MOD MDM: CPT

## 2023-09-25 RX ORDER — IBUPROFEN 200 MG
600 TABLET ORAL ONCE
Refills: 0 | Status: COMPLETED | OUTPATIENT
Start: 2023-09-25 | End: 2023-09-25

## 2023-09-25 RX ADMIN — Medication 600 MILLIGRAM(S): at 17:07
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
## 2023-09-25 NOTE — ED PROVIDER NOTE - PATIENT PORTAL LINK FT
You can access the FollowMyHealth Patient Portal offered by [facility] by registering at the following website: [email or web address]. By joining [employer]’s FollowMyHealth portal, you will also be able to view your health information using other applications (apps) compatible with our system.
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
## 2023-10-03 ENCOUNTER — APPOINTMENT (OUTPATIENT)
Dept: SURGERY | Facility: CLINIC | Age: 58
End: 2023-10-03

## 2023-10-19 NOTE — ED PROVIDER NOTE - DURATION
Show Topical Anesthesia Variable?: Yes Number Of Freeze-Thaw Cycles: 2 freeze-thaw cycles Post-Care Instructions: I reviewed with the patient in detail post-care instructions. Patient is to wear sunprotection, and avoid picking at any of the treated lesions. Pt may apply Vaseline to crusted or scabbing areas. Application Tool (Optional): Liquid Nitrogen Sprayer Detail Level: Detailed Consent: The patient's consent was obtained including but not limited to risks of crusting, scabbing, blistering, scarring, darker or lighter pigmentary change, recurrence, incomplete removal and infection. Medical Necessity Information: It is in your best interest to select a reason for this procedure from the list below. All of these items fulfill various CMS LCD requirements except the new and changing color options. Medical Necessity Clause: This procedure was medically necessary because the lesions that were treated were: Add 52 Modifier (Optional): no Spray Paint Text: The liquid nitrogen was applied to the skin utilizing a spray paint frosting technique. Duration Of Freeze Thaw-Cycle (Seconds): 3 day(s)

## 2023-11-23 ENCOUNTER — EMERGENCY (EMERGENCY)
Facility: HOSPITAL | Age: 58
LOS: 1 days | Discharge: HOME | End: 2023-11-23
Attending: EMERGENCY MEDICINE | Admitting: EMERGENCY MEDICINE
Payer: COMMERCIAL

## 2023-11-23 VITALS
DIASTOLIC BLOOD PRESSURE: 86 MMHG | RESPIRATION RATE: 18 BRPM | HEART RATE: 73 BPM | SYSTOLIC BLOOD PRESSURE: 104 MMHG | OXYGEN SATURATION: 100 % | TEMPERATURE: 98 F

## 2023-11-23 VITALS
SYSTOLIC BLOOD PRESSURE: 121 MMHG | RESPIRATION RATE: 16 BRPM | OXYGEN SATURATION: 100 % | TEMPERATURE: 98 F | HEART RATE: 72 BPM | DIASTOLIC BLOOD PRESSURE: 87 MMHG

## 2023-11-23 DIAGNOSIS — Z98.89 OTHER SPECIFIED POSTPROCEDURAL STATES: Chronic | ICD-10-CM

## 2023-11-23 DIAGNOSIS — Z90.49 ACQUIRED ABSENCE OF OTHER SPECIFIED PARTS OF DIGESTIVE TRACT: Chronic | ICD-10-CM

## 2023-11-23 DIAGNOSIS — Z90.89 ACQUIRED ABSENCE OF OTHER ORGANS: Chronic | ICD-10-CM

## 2023-11-23 DIAGNOSIS — Z98.890 OTHER SPECIFIED POSTPROCEDURAL STATES: Chronic | ICD-10-CM

## 2023-11-23 DIAGNOSIS — Z98.84 BARIATRIC SURGERY STATUS: Chronic | ICD-10-CM

## 2023-11-23 LAB
ALBUMIN SERPL ELPH-MCNC: 3.2 G/DL — LOW (ref 3.3–5)
ALBUMIN SERPL ELPH-MCNC: 3.2 G/DL — LOW (ref 3.3–5)
ALP SERPL-CCNC: 78 U/L — SIGNIFICANT CHANGE UP (ref 40–120)
ALP SERPL-CCNC: 78 U/L — SIGNIFICANT CHANGE UP (ref 40–120)
ALT FLD-CCNC: 49 U/L — HIGH (ref 4–41)
ALT FLD-CCNC: 49 U/L — HIGH (ref 4–41)
ANION GAP SERPL CALC-SCNC: 8 MMOL/L — SIGNIFICANT CHANGE UP (ref 7–14)
ANION GAP SERPL CALC-SCNC: 8 MMOL/L — SIGNIFICANT CHANGE UP (ref 7–14)
AST SERPL-CCNC: 37 U/L — SIGNIFICANT CHANGE UP (ref 4–40)
AST SERPL-CCNC: 37 U/L — SIGNIFICANT CHANGE UP (ref 4–40)
B PERT DNA SPEC QL NAA+PROBE: SIGNIFICANT CHANGE UP
B PERT DNA SPEC QL NAA+PROBE: SIGNIFICANT CHANGE UP
B PERT+PARAPERT DNA PNL SPEC NAA+PROBE: SIGNIFICANT CHANGE UP
B PERT+PARAPERT DNA PNL SPEC NAA+PROBE: SIGNIFICANT CHANGE UP
BASE EXCESS BLDV CALC-SCNC: 4.9 MMOL/L — HIGH (ref -2–3)
BASE EXCESS BLDV CALC-SCNC: 4.9 MMOL/L — HIGH (ref -2–3)
BASOPHILS # BLD AUTO: 0.1 K/UL — SIGNIFICANT CHANGE UP (ref 0–0.2)
BASOPHILS # BLD AUTO: 0.1 K/UL — SIGNIFICANT CHANGE UP (ref 0–0.2)
BASOPHILS NFR BLD AUTO: 1.7 % — SIGNIFICANT CHANGE UP (ref 0–2)
BASOPHILS NFR BLD AUTO: 1.7 % — SIGNIFICANT CHANGE UP (ref 0–2)
BILIRUB SERPL-MCNC: 0.4 MG/DL — SIGNIFICANT CHANGE UP (ref 0.2–1.2)
BILIRUB SERPL-MCNC: 0.4 MG/DL — SIGNIFICANT CHANGE UP (ref 0.2–1.2)
BLOOD GAS VENOUS COMPREHENSIVE RESULT: SIGNIFICANT CHANGE UP
BLOOD GAS VENOUS COMPREHENSIVE RESULT: SIGNIFICANT CHANGE UP
BORDETELLA PARAPERTUSSIS (RAPRVP): SIGNIFICANT CHANGE UP
BORDETELLA PARAPERTUSSIS (RAPRVP): SIGNIFICANT CHANGE UP
BUN SERPL-MCNC: 10 MG/DL — SIGNIFICANT CHANGE UP (ref 7–23)
BUN SERPL-MCNC: 10 MG/DL — SIGNIFICANT CHANGE UP (ref 7–23)
C PNEUM DNA SPEC QL NAA+PROBE: SIGNIFICANT CHANGE UP
C PNEUM DNA SPEC QL NAA+PROBE: SIGNIFICANT CHANGE UP
CALCIUM SERPL-MCNC: 8.2 MG/DL — LOW (ref 8.4–10.5)
CALCIUM SERPL-MCNC: 8.2 MG/DL — LOW (ref 8.4–10.5)
CHLORIDE BLDV-SCNC: 102 MMOL/L — SIGNIFICANT CHANGE UP (ref 96–108)
CHLORIDE BLDV-SCNC: 102 MMOL/L — SIGNIFICANT CHANGE UP (ref 96–108)
CHLORIDE SERPL-SCNC: 103 MMOL/L — SIGNIFICANT CHANGE UP (ref 98–107)
CHLORIDE SERPL-SCNC: 103 MMOL/L — SIGNIFICANT CHANGE UP (ref 98–107)
CO2 BLDV-SCNC: 33 MMOL/L — HIGH (ref 22–26)
CO2 BLDV-SCNC: 33 MMOL/L — HIGH (ref 22–26)
CO2 SERPL-SCNC: 28 MMOL/L — SIGNIFICANT CHANGE UP (ref 22–31)
CO2 SERPL-SCNC: 28 MMOL/L — SIGNIFICANT CHANGE UP (ref 22–31)
CREAT SERPL-MCNC: 0.58 MG/DL — SIGNIFICANT CHANGE UP (ref 0.5–1.3)
CREAT SERPL-MCNC: 0.58 MG/DL — SIGNIFICANT CHANGE UP (ref 0.5–1.3)
EGFR: 112 ML/MIN/1.73M2 — SIGNIFICANT CHANGE UP
EGFR: 112 ML/MIN/1.73M2 — SIGNIFICANT CHANGE UP
EOSINOPHIL # BLD AUTO: 0.05 K/UL — SIGNIFICANT CHANGE UP (ref 0–0.5)
EOSINOPHIL # BLD AUTO: 0.05 K/UL — SIGNIFICANT CHANGE UP (ref 0–0.5)
EOSINOPHIL NFR BLD AUTO: 0.8 % — SIGNIFICANT CHANGE UP (ref 0–6)
EOSINOPHIL NFR BLD AUTO: 0.8 % — SIGNIFICANT CHANGE UP (ref 0–6)
FLUAV H1 2009 PAND RNA SPEC QL NAA+PROBE: DETECTED
FLUAV H1 2009 PAND RNA SPEC QL NAA+PROBE: DETECTED
FLUBV RNA SPEC QL NAA+PROBE: SIGNIFICANT CHANGE UP
FLUBV RNA SPEC QL NAA+PROBE: SIGNIFICANT CHANGE UP
GAS PNL BLDV: 134 MMOL/L — LOW (ref 136–145)
GAS PNL BLDV: 134 MMOL/L — LOW (ref 136–145)
GAS PNL BLDV: SIGNIFICANT CHANGE UP
GAS PNL BLDV: SIGNIFICANT CHANGE UP
GLUCOSE BLDV-MCNC: 84 MG/DL — SIGNIFICANT CHANGE UP (ref 70–99)
GLUCOSE BLDV-MCNC: 84 MG/DL — SIGNIFICANT CHANGE UP (ref 70–99)
GLUCOSE SERPL-MCNC: 83 MG/DL — SIGNIFICANT CHANGE UP (ref 70–99)
GLUCOSE SERPL-MCNC: 83 MG/DL — SIGNIFICANT CHANGE UP (ref 70–99)
HADV DNA SPEC QL NAA+PROBE: SIGNIFICANT CHANGE UP
HADV DNA SPEC QL NAA+PROBE: SIGNIFICANT CHANGE UP
HCO3 BLDV-SCNC: 31 MMOL/L — HIGH (ref 22–29)
HCO3 BLDV-SCNC: 31 MMOL/L — HIGH (ref 22–29)
HCOV 229E RNA SPEC QL NAA+PROBE: SIGNIFICANT CHANGE UP
HCOV 229E RNA SPEC QL NAA+PROBE: SIGNIFICANT CHANGE UP
HCOV HKU1 RNA SPEC QL NAA+PROBE: SIGNIFICANT CHANGE UP
HCOV HKU1 RNA SPEC QL NAA+PROBE: SIGNIFICANT CHANGE UP
HCOV NL63 RNA SPEC QL NAA+PROBE: SIGNIFICANT CHANGE UP
HCOV NL63 RNA SPEC QL NAA+PROBE: SIGNIFICANT CHANGE UP
HCOV OC43 RNA SPEC QL NAA+PROBE: SIGNIFICANT CHANGE UP
HCOV OC43 RNA SPEC QL NAA+PROBE: SIGNIFICANT CHANGE UP
HCT VFR BLD CALC: 42.5 % — SIGNIFICANT CHANGE UP (ref 39–50)
HCT VFR BLD CALC: 42.5 % — SIGNIFICANT CHANGE UP (ref 39–50)
HCT VFR BLDA CALC: 41 % — SIGNIFICANT CHANGE UP (ref 39–51)
HCT VFR BLDA CALC: 41 % — SIGNIFICANT CHANGE UP (ref 39–51)
HGB BLD CALC-MCNC: 13.7 G/DL — SIGNIFICANT CHANGE UP (ref 12.6–17.4)
HGB BLD CALC-MCNC: 13.7 G/DL — SIGNIFICANT CHANGE UP (ref 12.6–17.4)
HGB BLD-MCNC: 13.8 G/DL — SIGNIFICANT CHANGE UP (ref 13–17)
HGB BLD-MCNC: 13.8 G/DL — SIGNIFICANT CHANGE UP (ref 13–17)
HMPV RNA SPEC QL NAA+PROBE: SIGNIFICANT CHANGE UP
HMPV RNA SPEC QL NAA+PROBE: SIGNIFICANT CHANGE UP
HPIV1 RNA SPEC QL NAA+PROBE: SIGNIFICANT CHANGE UP
HPIV1 RNA SPEC QL NAA+PROBE: SIGNIFICANT CHANGE UP
HPIV2 RNA SPEC QL NAA+PROBE: SIGNIFICANT CHANGE UP
HPIV2 RNA SPEC QL NAA+PROBE: SIGNIFICANT CHANGE UP
HPIV3 RNA SPEC QL NAA+PROBE: SIGNIFICANT CHANGE UP
HPIV3 RNA SPEC QL NAA+PROBE: SIGNIFICANT CHANGE UP
HPIV4 RNA SPEC QL NAA+PROBE: SIGNIFICANT CHANGE UP
HPIV4 RNA SPEC QL NAA+PROBE: SIGNIFICANT CHANGE UP
IANC: 2.23 K/UL — SIGNIFICANT CHANGE UP (ref 1.8–7.4)
IANC: 2.23 K/UL — SIGNIFICANT CHANGE UP (ref 1.8–7.4)
LACTATE BLDV-MCNC: 1.5 MMOL/L — SIGNIFICANT CHANGE UP (ref 0.5–2)
LACTATE BLDV-MCNC: 1.5 MMOL/L — SIGNIFICANT CHANGE UP (ref 0.5–2)
LYMPHOCYTES # BLD AUTO: 2.48 K/UL — SIGNIFICANT CHANGE UP (ref 1–3.3)
LYMPHOCYTES # BLD AUTO: 2.48 K/UL — SIGNIFICANT CHANGE UP (ref 1–3.3)
LYMPHOCYTES # BLD AUTO: 41.9 % — SIGNIFICANT CHANGE UP (ref 13–44)
LYMPHOCYTES # BLD AUTO: 41.9 % — SIGNIFICANT CHANGE UP (ref 13–44)
M PNEUMO DNA SPEC QL NAA+PROBE: SIGNIFICANT CHANGE UP
M PNEUMO DNA SPEC QL NAA+PROBE: SIGNIFICANT CHANGE UP
MANUAL SMEAR VERIFICATION: SIGNIFICANT CHANGE UP
MANUAL SMEAR VERIFICATION: SIGNIFICANT CHANGE UP
MCHC RBC-ENTMCNC: 27.5 PG — SIGNIFICANT CHANGE UP (ref 27–34)
MCHC RBC-ENTMCNC: 27.5 PG — SIGNIFICANT CHANGE UP (ref 27–34)
MCHC RBC-ENTMCNC: 32.5 GM/DL — SIGNIFICANT CHANGE UP (ref 32–36)
MCHC RBC-ENTMCNC: 32.5 GM/DL — SIGNIFICANT CHANGE UP (ref 32–36)
MCV RBC AUTO: 84.8 FL — SIGNIFICANT CHANGE UP (ref 80–100)
MCV RBC AUTO: 84.8 FL — SIGNIFICANT CHANGE UP (ref 80–100)
METAMYELOCYTES # FLD: 0.9 % — SIGNIFICANT CHANGE UP (ref 0–1)
METAMYELOCYTES # FLD: 0.9 % — SIGNIFICANT CHANGE UP (ref 0–1)
MONOCYTES # BLD AUTO: 0.56 K/UL — SIGNIFICANT CHANGE UP (ref 0–0.9)
MONOCYTES # BLD AUTO: 0.56 K/UL — SIGNIFICANT CHANGE UP (ref 0–0.9)
MONOCYTES NFR BLD AUTO: 9.4 % — SIGNIFICANT CHANGE UP (ref 2–14)
MONOCYTES NFR BLD AUTO: 9.4 % — SIGNIFICANT CHANGE UP (ref 2–14)
MYELOCYTES NFR BLD: 1.7 % — HIGH (ref 0–0)
MYELOCYTES NFR BLD: 1.7 % — HIGH (ref 0–0)
NEUTROPHILS # BLD AUTO: 2.32 K/UL — SIGNIFICANT CHANGE UP (ref 1.8–7.4)
NEUTROPHILS # BLD AUTO: 2.32 K/UL — SIGNIFICANT CHANGE UP (ref 1.8–7.4)
NEUTROPHILS NFR BLD AUTO: 39.3 % — LOW (ref 43–77)
NEUTROPHILS NFR BLD AUTO: 39.3 % — LOW (ref 43–77)
PCO2 BLDV: 53 MMHG — SIGNIFICANT CHANGE UP (ref 42–55)
PCO2 BLDV: 53 MMHG — SIGNIFICANT CHANGE UP (ref 42–55)
PH BLDV: 7.38 — SIGNIFICANT CHANGE UP (ref 7.32–7.43)
PH BLDV: 7.38 — SIGNIFICANT CHANGE UP (ref 7.32–7.43)
PLAT MORPH BLD: NORMAL — SIGNIFICANT CHANGE UP
PLAT MORPH BLD: NORMAL — SIGNIFICANT CHANGE UP
PLATELET # BLD AUTO: 231 K/UL — SIGNIFICANT CHANGE UP (ref 150–400)
PLATELET # BLD AUTO: 231 K/UL — SIGNIFICANT CHANGE UP (ref 150–400)
PLATELET COUNT - ESTIMATE: NORMAL — SIGNIFICANT CHANGE UP
PLATELET COUNT - ESTIMATE: NORMAL — SIGNIFICANT CHANGE UP
PO2 BLDV: 20 MMHG — LOW (ref 25–45)
PO2 BLDV: 20 MMHG — LOW (ref 25–45)
POTASSIUM BLDV-SCNC: 4.8 MMOL/L — SIGNIFICANT CHANGE UP (ref 3.5–5.1)
POTASSIUM BLDV-SCNC: 4.8 MMOL/L — SIGNIFICANT CHANGE UP (ref 3.5–5.1)
POTASSIUM SERPL-MCNC: 4.2 MMOL/L — SIGNIFICANT CHANGE UP (ref 3.5–5.3)
POTASSIUM SERPL-MCNC: 4.2 MMOL/L — SIGNIFICANT CHANGE UP (ref 3.5–5.3)
POTASSIUM SERPL-SCNC: 4.2 MMOL/L — SIGNIFICANT CHANGE UP (ref 3.5–5.3)
POTASSIUM SERPL-SCNC: 4.2 MMOL/L — SIGNIFICANT CHANGE UP (ref 3.5–5.3)
PROT SERPL-MCNC: 6.3 G/DL — SIGNIFICANT CHANGE UP (ref 6–8.3)
PROT SERPL-MCNC: 6.3 G/DL — SIGNIFICANT CHANGE UP (ref 6–8.3)
RAPID RVP RESULT: DETECTED
RAPID RVP RESULT: DETECTED
RBC # BLD: 5.01 M/UL — SIGNIFICANT CHANGE UP (ref 4.2–5.8)
RBC # BLD: 5.01 M/UL — SIGNIFICANT CHANGE UP (ref 4.2–5.8)
RBC # FLD: 13.2 % — SIGNIFICANT CHANGE UP (ref 10.3–14.5)
RBC # FLD: 13.2 % — SIGNIFICANT CHANGE UP (ref 10.3–14.5)
RBC BLD AUTO: NORMAL — SIGNIFICANT CHANGE UP
RBC BLD AUTO: NORMAL — SIGNIFICANT CHANGE UP
RSV RNA SPEC QL NAA+PROBE: SIGNIFICANT CHANGE UP
RSV RNA SPEC QL NAA+PROBE: SIGNIFICANT CHANGE UP
RV+EV RNA SPEC QL NAA+PROBE: SIGNIFICANT CHANGE UP
RV+EV RNA SPEC QL NAA+PROBE: SIGNIFICANT CHANGE UP
SAO2 % BLDV: 18.4 % — LOW (ref 67–88)
SAO2 % BLDV: 18.4 % — LOW (ref 67–88)
SARS-COV-2 RNA SPEC QL NAA+PROBE: SIGNIFICANT CHANGE UP
SARS-COV-2 RNA SPEC QL NAA+PROBE: SIGNIFICANT CHANGE UP
SMUDGE CELLS # BLD: PRESENT — SIGNIFICANT CHANGE UP
SMUDGE CELLS # BLD: PRESENT — SIGNIFICANT CHANGE UP
SODIUM SERPL-SCNC: 139 MMOL/L — SIGNIFICANT CHANGE UP (ref 135–145)
SODIUM SERPL-SCNC: 139 MMOL/L — SIGNIFICANT CHANGE UP (ref 135–145)
VARIANT LYMPHS # BLD: 4.3 % — SIGNIFICANT CHANGE UP (ref 0–6)
VARIANT LYMPHS # BLD: 4.3 % — SIGNIFICANT CHANGE UP (ref 0–6)
WBC # BLD: 5.91 K/UL — SIGNIFICANT CHANGE UP (ref 3.8–10.5)
WBC # BLD: 5.91 K/UL — SIGNIFICANT CHANGE UP (ref 3.8–10.5)
WBC # FLD AUTO: 5.91 K/UL — SIGNIFICANT CHANGE UP (ref 3.8–10.5)
WBC # FLD AUTO: 5.91 K/UL — SIGNIFICANT CHANGE UP (ref 3.8–10.5)

## 2023-11-23 PROCEDURE — 71045 X-RAY EXAM CHEST 1 VIEW: CPT | Mod: 26

## 2023-11-23 PROCEDURE — 93010 ELECTROCARDIOGRAM REPORT: CPT

## 2023-11-23 PROCEDURE — 99284 EMERGENCY DEPT VISIT MOD MDM: CPT

## 2023-11-23 RX ORDER — ALBUTEROL 90 UG/1
4 AEROSOL, METERED ORAL
Qty: 1 | Refills: 0
Start: 2023-11-23 | End: 2023-12-22

## 2023-11-23 RX ORDER — LEVOFLOXACIN 5 MG/ML
1 INJECTION, SOLUTION INTRAVENOUS
Refills: 0
Start: 2023-11-23

## 2023-11-23 RX ORDER — IPRATROPIUM/ALBUTEROL SULFATE 18-103MCG
3 AEROSOL WITH ADAPTER (GRAM) INHALATION
Refills: 0 | Status: COMPLETED | OUTPATIENT
Start: 2023-11-23 | End: 2023-11-23

## 2023-11-23 RX ORDER — DEXAMETHASONE 0.5 MG/5ML
6 ELIXIR ORAL ONCE
Refills: 0 | Status: COMPLETED | OUTPATIENT
Start: 2023-11-23 | End: 2023-11-23

## 2023-11-23 RX ADMIN — Medication 3 MILLILITER(S): at 12:35

## 2023-11-23 RX ADMIN — Medication 3 MILLILITER(S): at 12:25

## 2023-11-23 RX ADMIN — Medication 3 MILLILITER(S): at 12:45

## 2023-11-23 RX ADMIN — Medication 6 MILLIGRAM(S): at 12:24

## 2023-11-23 NOTE — ED PROVIDER NOTE - PROGRESS NOTE DETAILS
Patient reassessed - no wheezing on exam, informed Flu+. Will prescribe albuterol.     Bharti Gutierrez MD, PGY3

## 2023-11-23 NOTE — ED ADULT NURSE NOTE - CHIEF COMPLAINT QUOTE
20-Mar-2021 11:43 patient c/o shortness of breath x2 days. patient audibly wheezing in triage, and visibly short of breath. patient took his albuterol inhaler last night without relief. past medical history of asthma, GERD.

## 2023-11-23 NOTE — ED ADULT NURSE NOTE - OBJECTIVE STATEMENT
Received pt to bed 1, A+Ox4, ambulatory. C/O SOB, non productive cough x 2 weeks, pt states he returned from [place] yesterday. Respirations even and labored, pt unable to speak in full clear uninterrupted sentences. Pt denies any chest pain, headache, dizziness, N+V, diarrhea, fever, chills.  Labs sent, Medicated as per Provider orders, will continue to monitor.

## 2023-11-23 NOTE — ED PROVIDER NOTE - OBJECTIVE STATEMENT
57yo male pt PMHx of asthma who presents to the ED for SOB, cough and tactile fever since 1 week ago. Patient returned yesterday from nat. Denies cp, n/v/d

## 2023-11-23 NOTE — ED PROVIDER NOTE - CLINICAL SUMMARY MEDICAL DECISION MAKING FREE TEXT BOX
57yo male pt w pmhx of asthma who presents to the ED most likely w asthma exacerbation iso viral uri after recent travel. On exam with wheezing throughout. Will eval with viral swab, cxr. Will rule out bact pna vs viral pna. Will tx with duonebs and steroids. Will reassess. Patietn currently not requiring O2. will reassess after treatment and dispo accordingly.

## 2023-11-23 NOTE — ED PROVIDER NOTE - PATIENT PORTAL LINK FT
You can access the FollowMyHealth Patient Portal offered by [facility] by registering at the following website: [email or web address]. By joining [facility]’s FollowMyHealth portal, you will also be able to view your health information using other applications (apps) compatible with our system.

## 2023-11-23 NOTE — ED PROVIDER NOTE - ATTENDING CONTRIBUTION TO CARE
The patient is a 58y Obese Male who has a past medical and surgery history of SHARMIN GERD Asthma (ETT x 2 1992) Gastric sleeve 2015 DM2 HTN Lower back pain tonsillectomy appendectomy ollie HR Septoplasty  PTED with   Vital Signs Last 24 Hrs  T(F): 97.8 HR: 72 BP: 121/87 RR: 16 SpO2: 100% (23 Nov 2023 16:08) (99% - 100%)  PE: as described; my additions and exceptions are noted in the chart    DATA:  Labs:   Blood Gas Venous - Lactate: 1.5 mmol/L (11-23-23 @ 12:00)                        13.8   5.91  )-----------( 231      ( 23 Nov 2023 12:00 )             42.5     11-23    139  |  103  |  10  ----------------------------<  83  4.2   |  28  |  0.58    Ca    8.2<L>      23 Nov 2023 12:00    TPro  6.3  /  Alb  3.2<L>  /  TBili  0.4  /  DBili  x   /  AST  37  /  ALT  49<H>  /  AlkPhos  78  11-23     Urinalysis Basic - ( 23 Nov 2023 12:00 )  Color: x / Appearance: x / SG: x / pH: x  Gluc: 83 mg/dL / Ketone: x  / Bili: x / Urobili: x   Blood: x / Protein: x / Nitrite: x   Leuk Esterase: x / RBC: x / WBC x   Sq Epi: x / Non Sq Epi: x / Bacteria: x    IMPRESSION/RISK:  Dx=    Consideration include  Plan HPI Objective Statement: 57yo male pt PMHx of asthma who presents to the ED for SOB, cough and tactile fever since 1 week ago. Patient returned yesterday from nat. Denies cp, n/v/d  History of SHARMIN GERD Asthma (ETT x 2 1992) Gastric sleeve 2015 DM2 HTN Lower back pain tonsillectomy appendectomy ollie HR Septoplasty    Vital Signs Last 24 Hrs  T(F): 97.8 HR: 72 BP: 121/87 RR: 16 SpO2: 100% (23 Nov 2023 16:08) (99% - 100%)  PE: as described; my additions and exceptions are noted in the chart    DATA:  Labs:   Blood Gas Venous - Lactate: 1.5 mmol/L (11-23-23 @ 12:00)                        13.8   5.91  )-----------( 231      ( 23 Nov 2023 12:00 )             42.5     11-23    139  |  103  |  10  ----------------------------<  83  4.2   |  28  |  0.58    Ca    8.2<L>      23 Nov 2023 12:00    TPro  6.3  /  Alb  3.2<L>  /  TBili  0.4  /  DBili  x   /  AST  37  /  ALT  49<H>  /  AlkPhos  78  11-23     Urinalysis Basic - ( 23 Nov 2023 12:00 )  Color: x / Appearance: x / SG: x / pH: x  Gluc: 83 mg/dL / Ketone: x  / Bili: x / Urobili: x   Blood: x / Protein: x / Nitrite: x   Leuk Esterase: x / RBC: x / WBC x   Sq Epi: x / Non Sq Epi: x / Bacteria: x    IMPRESSION/RISK:  Dx=    Consideration include will treat with dounebs/steroids and reassess   Plan  As Viral swab, cxr. Will rule out bact pna vs viral pna.   Patient currently not requiring O2.   Reassess after treatment  Dispo as per results and response

## 2023-11-23 NOTE — ED PROVIDER NOTE - PHYSICAL EXAMINATION
GENERAL: Awake, alert, NAD  HEENT: NC/AT, moist mucous membranes, EOMI  LUNGS: BL wheezing  CARDIAC: RRR, no m/r/g  ABDOMEN: Soft, non tender, non distended, no rebound, no guarding  BACK: No midline spinal tenderness   EXT: No edema, no calf tenderness, 2+ DP/PT pulses bilaterally, no deformities.  NEURO: A&Ox3. Moving all extremities.  SKIN: Warm and dry. No rash.  PSYCH: Normal affect.

## 2023-11-23 NOTE — ED PROVIDER NOTE - NSFOLLOWUPINSTRUCTIONS_ED_ALL_ED_FT
You were seen for an asthma exacerbation. You are flu positive. Please use your inhaler no less than four hours apart. If you need it more frequently, please return to the ER. Return if you have chest pain or shortness of breath. Follow up with your primary doctor in 2 days.    [facility] Internal Medicine  General Internal Medicine  [address] [place] [address]  Phone: [phone]  Fax:     [facility]  Internal Medicine  [address] [place] [address]  Phone: [phone]  Fax: [phone]    Asthma WHAT YOU NEED TO KNOW:    Asthma is a lung disease that makes breathing difficult. Chronic inflammation and reactions to triggers narrow the airways in the lungs. Asthma can become life-threatening if it is not managed.  Normal vs Asthmatic Bronchioles Adult    DISCHARGE INSTRUCTIONS:    Call your local emergency number (911 in the [place]) if:    You have severe shortness of breath.    The skin around your neck and ribs pulls in with each breath.    Your peak flow numbers are in the red zone of your AAP.  Seek care immediately if:    You have shortness of breath, even after you take your short-term medicine as directed.    Your lips or nails turn blue or gray.  Call your doctor or asthma specialist if:    You run out of medicine before your next refill is due.    Your symptoms get worse.    You need to take more medicine than usual to control your symptoms.    You have questions or concerns about your condition or care.  Medicines:    Medicines may be used to decrease inflammation, open airways, and make it easier to breathe. Medicines may be inhaled, taken as a pill, or injected. Short-term medicines relieve your symptoms quickly. Long-term medicines are used to prevent future asthma attacks. Other medicines may be needed if your regular medicines are not able to prevent attacks. You may also need medicine to help control your allergies. Ask your healthcare provider for more information about any medicine you are given and how to take it safely.    Take your medicine as directed. Contact your healthcare provider if you think your medicine is not helping or if you have side effects. Tell him or her if you are allergic to any medicine. Keep a list of the medicines, vitamins, and herbs you take. Include the amounts, and when and why you take them. Bring the list or the pill bottles to follow-up visits. Carry your medicine list with you in case of an emergency.  Manage your symptoms and prevent future attacks:    Follow your Asthma Action Plan (AAP). This is a written plan that you and your healthcare provider create. It explains which medicine you need and when to change doses if necessary. It also explains how you can monitor symptoms and use a peak flow meter. The meter measures how well your lungs are working.    Manage other health conditions, such as allergies, acid reflux, and sleep apnea.    Identify and avoid triggers. These may include pets, dust mites, mold, and cockroaches.    Do not smoke or be around others who smoke. Nicotine and other chemicals in cigarettes and cigars can cause lung damage. Ask your healthcare provider for information if you currently smoke and need help to quit. E-cigarettes or smokeless tobacco still contain nicotine. Talk to your healthcare provider before you use these products.    Ask about the flu vaccine. The flu can make your asthma worse. You may need a yearly flu shot.  Follow up with your healthcare provider as directed: You will need to return to make sure your medicine is working and your symptoms are controlled. You may be referred to an asthma or allergy specialist. You may be asked to keep a record of your peak flow values and bring it with you to your appointments. Write down your questions so you remember to ask them during your visits.

## 2023-11-23 NOTE — ED ADULT TRIAGE NOTE - CHIEF COMPLAINT QUOTE
patient c/o shortness of breath x2 days. patient audibly wheezing in triage, and visibly short of breath. patient took his albuterol inhaler last night without relief. past medical history of asthma, GERD.

## 2023-11-23 NOTE — ED ADULT NURSE REASSESSMENT NOTE - NS ED NURSE REASSESS COMMENT FT1
Report received from OTONIEL Villatoro. pt. remains A&Ox4, awake and resting. pt. offers no new complaints at this time. no acute distress noted. respirations even and unlabored. NSR on cardiac monitor. non-priductive cough noted upon initial interview. VS as noted. due medications given. swab collected and sent. pending XR.

## 2024-01-10 NOTE — ED ADULT NURSE NOTE - FALLEN IN THE PAST
Pt states that she thinks she may have passed out in the kitchen this morning, fell and now is having lower back and bilateral leg pain.  
no

## 2024-01-18 ENCOUNTER — LABORATORY RESULT (OUTPATIENT)
Age: 59
End: 2024-01-18

## 2024-01-18 ENCOUNTER — APPOINTMENT (OUTPATIENT)
Dept: PULMONOLOGY | Facility: CLINIC | Age: 59
End: 2024-01-18
Payer: COMMERCIAL

## 2024-01-18 VITALS
OXYGEN SATURATION: 97 % | HEIGHT: 63 IN | DIASTOLIC BLOOD PRESSURE: 68 MMHG | HEART RATE: 76 BPM | RESPIRATION RATE: 16 BRPM | TEMPERATURE: 96.6 F | WEIGHT: 161 LBS | SYSTOLIC BLOOD PRESSURE: 102 MMHG | BODY MASS INDEX: 28.53 KG/M2

## 2024-01-18 DIAGNOSIS — U07.1 COVID-19: ICD-10-CM

## 2024-01-18 DIAGNOSIS — J45.50 SEVERE PERSISTENT ASTHMA, UNCOMPLICATED: ICD-10-CM

## 2024-01-18 DIAGNOSIS — Z86.16 PERSONAL HISTORY OF COVID-19: ICD-10-CM

## 2024-01-18 LAB
25(OH)D3 SERPL-MCNC: 10.7 NG/ML
A1AT SERPL-MCNC: 166 MG/DL
BASOPHILS # BLD AUTO: 0.04 K/UL
BASOPHILS NFR BLD AUTO: 0.7 %
EOSINOPHIL # BLD AUTO: 0.11 K/UL
EOSINOPHIL NFR BLD AUTO: 2 %
HCT VFR BLD CALC: 44.2 %
HGB BLD-MCNC: 14.7 G/DL
IMM GRANULOCYTES NFR BLD AUTO: 1.3 %
LYMPHOCYTES # BLD AUTO: 2.23 K/UL
LYMPHOCYTES NFR BLD AUTO: 40.2 %
MAN DIFF?: NORMAL
MCHC RBC-ENTMCNC: 27.2 PG
MCHC RBC-ENTMCNC: 33.3 GM/DL
MCV RBC AUTO: 81.7 FL
MONOCYTES # BLD AUTO: 0.39 K/UL
MONOCYTES NFR BLD AUTO: 7 %
NEUTROPHILS # BLD AUTO: 2.71 K/UL
NEUTROPHILS NFR BLD AUTO: 48.8 %
PLATELET # BLD AUTO: 218 K/UL
RBC # BLD: 5.41 M/UL
RBC # FLD: 13.7 %
WBC # FLD AUTO: 5.55 K/UL

## 2024-01-18 PROCEDURE — 94618 PULMONARY STRESS TESTING: CPT

## 2024-01-18 PROCEDURE — 99204 OFFICE O/P NEW MOD 45 MIN: CPT | Mod: 25

## 2024-01-18 PROCEDURE — 94727 GAS DIL/WSHOT DETER LNG VOL: CPT

## 2024-01-18 PROCEDURE — 94729 DIFFUSING CAPACITY: CPT

## 2024-01-18 PROCEDURE — 71046 X-RAY EXAM CHEST 2 VIEWS: CPT

## 2024-01-18 PROCEDURE — 94060 EVALUATION OF WHEEZING: CPT

## 2024-01-18 PROCEDURE — 95012 NITRIC OXIDE EXP GAS DETER: CPT

## 2024-01-18 RX ORDER — PANTOPRAZOLE SODIUM 40 MG/1
40 GRANULE, DELAYED RELEASE ORAL
Refills: 0 | Status: DISCONTINUED | COMMUNITY
End: 2024-01-18

## 2024-01-18 RX ORDER — RIVAROXABAN 20 MG/1
20 TABLET, FILM COATED ORAL
Refills: 0 | Status: DISCONTINUED | COMMUNITY
End: 2024-01-18

## 2024-01-18 RX ORDER — PREDNISONE 10 MG/1
10 TABLET ORAL
Qty: 50 | Refills: 0 | Status: ACTIVE | COMMUNITY
Start: 2024-01-18 | End: 1900-01-01

## 2024-01-18 RX ORDER — SUCRALFATE 1 G/1
TABLET ORAL
Refills: 0 | Status: DISCONTINUED | COMMUNITY
End: 2024-01-18

## 2024-01-18 RX ORDER — LEVALBUTEROL HYDROCHLORIDE 0.63 MG/3ML
0.63 SOLUTION RESPIRATORY (INHALATION)
Qty: 1050 | Refills: 1 | Status: ACTIVE | COMMUNITY
Start: 2024-01-18 | End: 1900-01-01

## 2024-01-18 NOTE — ADDENDUM
[FreeTextEntry1] : Documented by Mani Ward acting as a scribe for Dr. Villa Coates on 01/18/2024.   All medical record entries made by the Scribe were at my, Dr. Villa Coates's, direction and personally dictated by me on 01/18/2024. I have reviewed the chart and agree that the record accurately reflects my personal performance of the history, physical exam, assessment and plan. I have also personally directed, reviewed, and agree with the discharge instructions.

## 2024-01-18 NOTE — PROCEDURE
[FreeTextEntry1] : CXR 01/18/2024 reveals a normal sized heart; no evidence of infiltrate or effusion -- a normal appearing chest radiograph  Full PFT reveals normal flows; FEV1 was 1.89 L which is 80% of predicted with a 11% improvement after bronchodilator; normal lung volumes; normal diffusion at 28.2, which is 142% of predicted; normal flow volume loop. PFTs were performed to evaluate for Asthma  6 minute walk test reveals a low saturation of 98% with no evidence of dyspnea or fatigue; walked 391.2 meters  FENO was 7; a normal value being less than 25 Fractional exhaled nitric oxide (FENO) is regarded as a simple, noninvasive method for assessing eosinophilic airway inflammation. Produced by a variety of cells within the lung, nitric oxide (NO) concentrations are generally low in healthy individuals. However, high concentrations of NO appear to be involved in nonspecific host defense mechanisms and chronic inflammatory diseases such as asthma. The American Thoracic Society (ATS) therefore has strongly recommended using FENO to aid in the assessment, management, and long-term monitoring of eosinophilic airway inflammation and asthma, and for identifying steroid responsive individuals whose chronic respiratory symptoms may be caused by airway inflammation. In their 2011 clinical practice guideline, the ATS emphasizes the importance of using FENO.

## 2024-01-18 NOTE — REASON FOR VISIT
[Initial] : an initial visit [TextBox_44] : SOB, Severe Persistent Asthma (Active s/p URI), ?TBM (prior saber sheath trachea), Allergies / Sinus, GERD, prior SHARMIN

## 2024-01-18 NOTE — PHYSICAL EXAM
[No Acute Distress] : no acute distress [Normal Oropharynx] : normal oropharynx [III] : Mallampati Class: III [Normal Appearance] : normal appearance [No Neck Mass] : no neck mass [Normal Rate/Rhythm] : normal rate/rhythm [Normal S1, S2] : normal s1, s2 [No Murmurs] : no murmurs [No Resp Distress] : no resp distress [No Abnormalities] : no abnormalities [Benign] : benign [Normal Gait] : normal gait [No Clubbing] : no clubbing [No Cyanosis] : no cyanosis [FROM] : FROM [Normal Color/ Pigmentation] : normal color/ pigmentation [No Focal Deficits] : no focal deficits [Oriented x3] : oriented x3 [Normal Affect] : normal affect [TextBox_68] : I:E ratio 1:3; diffuse expiratory wheezes bilaterally  [TextBox_105] : trace edema

## 2024-01-18 NOTE — HISTORY OF PRESENT ILLNESS
[TextBox_4] : Mr. PALENCIA  is a 58 year old male with a history of presenting to the office today for an re-initial pulmonary evaluation. His chief complaint is   - he notes he has had asthma for about 20 years - he notes the cold air and exertion bothers his asthma - he notes his asthma Sx are wheezing, coughing, and SOB - he notes the last time he was on steroids was last week - he notes seeing Siddhartha Pena  - he notes his allergy Sx are itchy eyes and PND  - he notes before last week, it has been a long time since he has been on steroids - he notes he has been coughing and wheezing for some time but was able to resolve it without inhalers  - he notes his present cough started off dry and then started to get productive with dark yellow mucus - he notes an occasional sour taste in the mouth  - he notes his sinuses are congested  - he notes his weight has been up and down 5 lbs. from 160  - he notes his appetite is good - he notes he has been getting less sleep - he notes his snoring has gotten less heavy  - vision is stable with reading glasses - he notes his balance is good - he notes bowels are regular - he notes some ankle and leg swelling  - he notes his last sleep study was years ago - he notes not sleeping with CPAP - he notes currently taking Amoxycillin and 50 mg of Prednisone (finished 1/17 after taking for 5 days) - he notes he was on Xarelto but stopped taking it - he notes not taking any medications currently  - he notes having Covid 3 times - he notes the last time he had Covid was in 2023 - he notes having to use his inhaler due to having some labored breathing with Covid   -he denies any headaches, nausea, emesis, fever, chills, sweats, chest pain, chest pressure, palpitations, constipation, diarrhea, vertigo, dysphagia, heartburn, reflux, itchy eyes, itchy ears, leg swelling, leg pain, arthralgias, myalgias

## 2024-01-18 NOTE — ASSESSMENT
[FreeTextEntry1] : Mr. 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), SHARMIN, Covid-19 x3  who now comes to the office for a re-initial pulmonary evaluation for SOB, Severe Persistent Asthma (Active s/p URI), ?TBM (prior saber sheath trachea), Allergies / Sinus, GERD, prior SHARMIN   His shortness of breath is multifactorial due to: -poor mechanics of breathing -out of shape -over weight -pulmonary disease   -Severe Persistent Asthma   -Saber Sheath Trachea - ?TBM   problem 1: Severe Persistent Asthma (Steroid Dependent prior) - Active s/p URI -continue Albuterol via nebulizer, Q6H -continue Breztri 2 puffs BID -add Singulair 10 mg QHS -add Prednisone 30 mg x 5 days, 20 mg x 5 days, 10 mg x 5 days (start after getting blood work done) -Inhaler technique reviewed as well as oral hygiene techniques reviewed with patient. Avoidance of cold air, extremes of temperature, rescue inhaler should be used before exercise. Order of medication reviewed with patient. Recommended use of a cool mist humidifier in the bedroom. - Asthma is believed to be caused by inherited (genetic) and environmental factor, but its exact cause is unknown.  - Asthma may be triggered by allergens, lung infections, or irritants in the air. Asthma triggers are different for each person  problem 1A: Biologic Evaluation -get Blood work to include: asthma panel, food IgE panel, IgE level, eosinophil level, vitamin D level -Type 2 asthma, which accounts for approximately 70% of all cases, is indicated by high eosinophil counts and elevated levels of T2 cytokines. Six biologic agents are FDA approved to treat moderate to severe asthma by targeting various cytokines and cell surface receptors involved in the inflammatory process in asthma. Several biologic therapies are also indicated for other inflammatory conditions marked by high eosinophils. Efficacy of biologic therapy should be assessed after 4 to 6 months of treatment.   problem 2: Saber Sheath Trachea - ?TBM -get Dynamic CT -add Amitriptyline 10 mg up to TID, QHS Tracheomalacia is usually acquired in adults and common causes include damage by tracheostomy or endotracheal intubation damaging the tracheal cartilage with increase risk with multiple intubations, prolonged intubation, and concurrent high dose steroid therapy; external chest wall trauma and surgery; chronic compression of the trachea by benign etiologies (eg, benign mediastinal goiter) or malignancy; relapsing polychondritis; or recurrent infection. Tracheomalacia can be asymptomatic, however signs or symptoms can develop as the severity of the airway narrowing progresses with major symptoms include dyspnea, cough, and sputum retention. Other symptoms include severe paroxysms of coughing, wheezing or stridor, barking cough and may be exacerbated by forced expiration, cough, and valsalva maneuver. Tracheomalacia is diagnosed by a bronchoscopic visualization of dynamic airway collapse on dynamic chest CT. Therapy is warranted in symptomatic patients with severe tracheomalacia and includes surgical repair as tracheobronchoplasty. The patient was referred to Dr. Davdi Killian or Dr. Keven Jefferson, at [facility] for a surgical consult.   Problem 3: Allergies / Sinus -add Dymista 1 sniff BID Environmental measures for allergies were encouraged including mattress and pillow covers, air purifier, and environmental controls.   Problem 4: GERD (s/p Agnieszka-en-Y procedure) -Rule of 2s: avoid eating too much, eating too late, eating too spicy, eating two hours before bed. -Things to avoid including overeating, spicy foods, tight clothing, eating within three hours of bed, this list is not all inclusive. -For treatment of reflux, possible options discussed including diet control, H2 blockers, PPIs, as well as coating motility agents discussed as treatment options. Timing of meals and proximity of last meal to sleep were discussed. If symptoms persist, a formal gastrointestinal evaluation is needed.   Problem 5: Low Vitamin D Low vitamin D levels have been associated with asthma exacerbations and increased allergic symptoms. The goal based on recent information is maintaining levels between 50-70 and low normal is 30. Recommended 50,000 units every two weeks to once a month depending on the level.  problem 6: prior SHARMIN -get home sleep study  -Sleep apnea is associated with adverse clinical consequences which can affect most organ systems. Cardiovascular disease risk includes arrhythmias, atrial fibrillation, hypertension, coronary artery disease, and stroke. Metabolic disorders include diabetes type 2, non-alcoholic fatty liver disease. Mood disorder especially depression; and cognitive decline especially in the elderly. Associations with chronic reflux/Deven's esophagus some but now all inclusive. -Reasons include arousal consistent with hypopnea; respiratory events most prominent in REM sleep or supine position; therefore sleep staging and body position are important for accurate diagnosis and estimation of AHI.   problem 7: cardiac  -recommended to continue to follow up with Cardiologist (Kelsie)   problem 8: poor breathing mechanics -Proper breathing techniques were reviewed with an emphasis of exhalation. Patient instructed to breath in for 1 second and out for four seconds. Patient was encouraged to not talk while walking.   problem 9: overweight/ out of shape -recommended Berberine OTC -Weight loss, exercise, and diet control were discussed and are highly encouraged. Treatment options are given such as, aqua therapy, and contacting a nutritionist. Recommended to use the elliptical, stationary bike, less use of treadmill.   problem 10: health maintenance -recommended yearly flu shot after October 15 -recommended strep pneumonia vaccines: Prevnar-20 vaccine, followed by Pneumo vaccine 23 one year following after 65 years old. -recommended early intervention for Upper Respiratory Infections (URIs) -recommended regular osteoporosis evaluations -recommended early dermatological evaluations -recommended after the age of 50 to the age of 70, colonoscopy every 5 years   F/U in 6-8 weeks. He is encouraged to call with any changes, concerns, or questions

## 2024-01-19 LAB — 24R-OH-CALCIDIOL SERPL-MCNC: 104 PG/ML

## 2024-01-20 ENCOUNTER — APPOINTMENT (OUTPATIENT)
Dept: CT IMAGING | Facility: IMAGING CENTER | Age: 59
End: 2024-01-20
Payer: COMMERCIAL

## 2024-01-20 ENCOUNTER — OUTPATIENT (OUTPATIENT)
Dept: OUTPATIENT SERVICES | Facility: HOSPITAL | Age: 59
LOS: 1 days | End: 2024-01-20
Payer: COMMERCIAL

## 2024-01-20 DIAGNOSIS — Z98.890 OTHER SPECIFIED POSTPROCEDURAL STATES: Chronic | ICD-10-CM

## 2024-01-20 DIAGNOSIS — Z90.89 ACQUIRED ABSENCE OF OTHER ORGANS: Chronic | ICD-10-CM

## 2024-01-20 DIAGNOSIS — Z98.89 OTHER SPECIFIED POSTPROCEDURAL STATES: Chronic | ICD-10-CM

## 2024-01-20 DIAGNOSIS — Z90.49 ACQUIRED ABSENCE OF OTHER SPECIFIED PARTS OF DIGESTIVE TRACT: Chronic | ICD-10-CM

## 2024-01-20 DIAGNOSIS — J39.8 OTHER SPECIFIED DISEASES OF UPPER RESPIRATORY TRACT: ICD-10-CM

## 2024-01-20 DIAGNOSIS — Z98.84 BARIATRIC SURGERY STATUS: Chronic | ICD-10-CM

## 2024-01-20 PROCEDURE — 71250 CT THORAX DX C-: CPT

## 2024-01-20 PROCEDURE — 71250 CT THORAX DX C-: CPT | Mod: 26

## 2024-01-21 LAB
A ALTERNATA IGE QN: <0.1 KUA/L
A FUMIGATUS IGE QN: <0.1 KUA/L
ALMOND IGE QN: <0.1 KUA/L
BRAZIL NUT IGE QN: <0.1 KUA/L
C ALBICANS IGE QN: <0.1 KUA/L
C HERBARUM IGE QN: <0.1 KUA/L
CASHEW NUT IGE QN: <0.1 KUA/L
CAT DANDER IGE QN: <0.1 KUA/L
CODFISH IGE QN: <0.1 KUA/L
COMMON RAGWEED IGE QN: <0.1 KUA/L
COW MILK IGE QN: <0.1 KUA/L
D FARINAE IGE QN: 0.1 KUA/L
D PTERONYSS IGE QN: <0.1 KUA/L
DEPRECATED A ALTERNATA IGE RAST QL: 0 (ref 0–?)
DEPRECATED A FUMIGATUS IGE RAST QL: 0 (ref 0–?)
DEPRECATED ALMOND IGE RAST QL: 0 (ref 0–?)
DEPRECATED BRAZIL NUT IGE RAST QL: 0 (ref 0–?)
DEPRECATED C ALBICANS IGE RAST QL: 0
DEPRECATED C HERBARUM IGE RAST QL: 0 (ref 0–?)
DEPRECATED CASHEW NUT IGE RAST QL: 0 (ref 0–?)
DEPRECATED CAT DANDER IGE RAST QL: 0 (ref 0–?)
DEPRECATED CODFISH IGE RAST QL: 0 (ref 0–?)
DEPRECATED COMMON RAGWEED IGE RAST QL: 0 (ref 0–?)
DEPRECATED COW MILK IGE RAST QL: 0 (ref 0–?)
DEPRECATED D FARINAE IGE RAST QL: NORMAL (ref 0–?)
DEPRECATED D PTERONYSS IGE RAST QL: 0 (ref 0–?)
DEPRECATED DOG DANDER IGE RAST QL: 0 (ref 0–?)
DEPRECATED DUCK FEATHER IGE RAST QL: 0
DEPRECATED EGG WHITE IGE RAST QL: 0 (ref 0–?)
DEPRECATED GOOSE FEATHER IGE RAST QL: 0
DEPRECATED HAZELNUT IGE RAST QL: 0 (ref 0–?)
DEPRECATED M RACEMOSUS IGE RAST QL: 0
DEPRECATED PEANUT IGE RAST QL: 0 (ref 0–?)
DEPRECATED ROACH IGE RAST QL: NORMAL (ref 0–?)
DEPRECATED SALMON IGE RAST QL: 0 (ref 0–?)
DEPRECATED SCALLOP IGE RAST QL: <0.1 KUA/L
DEPRECATED SESAME SEED IGE RAST QL: 0 (ref 0–?)
DEPRECATED SHRIMP IGE RAST QL: NORMAL (ref 0–?)
DEPRECATED SOYBEAN IGE RAST QL: 0 (ref 0–?)
DEPRECATED TIMOTHY IGE RAST QL: 0 (ref 0–?)
DEPRECATED TUNA IGE RAST QL: 0 (ref 0–?)
DEPRECATED WALNUT IGE RAST QL: 0 (ref 0–?)
DEPRECATED WHEAT IGE RAST QL: 0 (ref 0–?)
DEPRECATED WHITE OAK IGE RAST QL: 0 (ref 0–?)
DOG DANDER IGE QN: <0.1 KUA/L
DUCK FEATHER IGE QN: <0.1 KUA/L
EGG WHITE IGE QN: <0.1 KUA/L
GOOSE FEATHER IGE QN: <0.1 KUA/L
HAZELNUT IGE QN: <0.1 KUA/L
M RACEMOSUS IGE QN: <0.1 KUA/L
PEANUT IGE QN: <0.1 KUA/L
ROACH IGE QN: 0.28 KUA/L
SALMON IGE QN: <0.1 KUA/L
SCALLOP IGE QN: 0 (ref 0–?)
SCALLOP IGE QN: 0.11 KUA/L
SESAME SEED IGE QN: <0.1 KUA/L
SOYBEAN IGE QN: <0.1 KUA/L
TIMOTHY IGE QN: <0.1 KUA/L
TOTAL IGE SMQN RAST: 12 KU/L
TUNA IGE QN: <0.1 KUA/L
WALNUT IGE QN: <0.1 KUA/L
WHEAT IGE QN: <0.1 KUA/L
WHITE OAK IGE QN: <0.1 KUA/L

## 2024-01-25 LAB
A1AT PHENOTYP SERPL-IMP: NORMAL
A1AT SERPL-MCNC: 165 MG/DL

## 2024-01-26 LAB
ANNOTATION COMMENT IMP: NORMAL
ELECTRONIC SIGNATURE: NORMAL
SERPINA1 GENE MUT TESTED BLD/T: NORMAL

## 2024-01-31 ENCOUNTER — APPOINTMENT (OUTPATIENT)
Dept: PULMONOLOGY | Facility: CLINIC | Age: 59
End: 2024-01-31

## 2024-02-02 NOTE — PATIENT PROFILE ADULT - NSPROGENBLOODRESTRICT_GEN_A_NUR
Daily Note     Today's date: 2024  Patient name: Elizabeth Graham  : 1957  MRN: [id number]  Referring provider: Elliott Russell MD  Dx:   Encounter Diagnosis     ICD-10-CM    1. Left medial tibial stress syndrome, subsequent encounter  S86.892D       2. Pain of left lower leg  M79.662           Start Time: 1015  Stop Time: 1100  Total time in clinic (min): 45 minutes    Subjective: Pt reports no new symptoms, some pain following recumbent bike.       Objective: See treatment diary below      Assessment: Tolerated treatment well. POC progressed to include more gait training interventions. Patient demonstrated fatigue post treatment and would benefit from continued PT      Plan: Continue per plan of care.      Visit/Unit Tracking  AUTH Status:  Date              NO Auth required Used 1 2             Visits till re-eval Remaining  11 10                    Precautions: N/a      Manuals 2/2            AP TC joint mob JF            STM gastroc-soleus JF                                      Neuro Re-Ed 2/2            Heel lifts Nv             TB 4 way PF 3x10            SLR supine and SL nv                                                                Ther Ex             Recumbent bike 8' p!                                                                                                       Ther Activity                                       Gait Training             Rock back and forth flat ground 10' PT ed HEP                         Modalities                                            
none

## 2024-02-22 NOTE — H&P PST ADULT - CARDIOVASCULAR
Depressor Anguli Oris Units: 0 Detail Level: Zone Additional Area 1 Location: Tail of eyebrow Price (Use Numbers Only, No Special Characters Or $): 0.00 Periorbital Skin Units: 54 Dilution (U/0.1 Cc): 12 Additional Area 2 Location: Jelly Roll Consent: Written consent obtained. Risks include but not limited to lid/brow ptosis, bruising, swelling, diplopia, temporary effect, incomplete chemical denervation. Additional Area 3 Location: Forehead *INJECT HIGH* Additional Area 2 Units: 24 Expiration Date (Month Year): 07/31/2025 Reconstitution Date (Optional): 02/22/2024 Post-Care Instructions: Patient instructed to not lie down for 4 hours and limit physical activity for 24 hours. Patient instructed not to travel by airplane for 48 hours. Lot #: [id number] Quantity Per Injection Site (Units Or Cc): 18 U Quantity Per Injection Site (Units Or Cc): 6 U detailed exam details…

## 2024-03-14 ENCOUNTER — APPOINTMENT (OUTPATIENT)
Dept: PULMONOLOGY | Facility: CLINIC | Age: 59
End: 2024-03-14
Payer: COMMERCIAL

## 2024-03-14 VITALS
RESPIRATION RATE: 16 BRPM | HEART RATE: 72 BPM | TEMPERATURE: 97.2 F | HEIGHT: 63 IN | DIASTOLIC BLOOD PRESSURE: 66 MMHG | BODY MASS INDEX: 28.35 KG/M2 | OXYGEN SATURATION: 98 % | WEIGHT: 160 LBS | SYSTOLIC BLOOD PRESSURE: 120 MMHG

## 2024-03-14 PROCEDURE — 94010 BREATHING CAPACITY TEST: CPT

## 2024-03-14 PROCEDURE — 99214 OFFICE O/P EST MOD 30 MIN: CPT | Mod: 25

## 2024-03-14 NOTE — ASSESSMENT
[FreeTextEntry1] : Mr. 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 Order of medication reviewed with patient. Recommended use of a cool mist humidifier in the bedroom. - Asthma is believed to be caused by inherited (genetic) and environmental factor, but its exact cause is unknown.  - Asthma may be triggered by allergens, lung infections, or irritants in the air. Asthma triggers are different for each person  problem 1A: Biologic Evaluation completed -get Blood work to include: asthma panel, food IgE panel, IgE level, eosinophil level, vitamin D level -Type 2 asthma, which accounts for approximately 70% of all cases, is indicated by high eosinophil counts and elevated levels of T2 cytokines. Six biologic agents are FDA approved to treat moderate to severe asthma by targeting various cytokines and cell surface receptors involved in the inflammatory process in asthma. Several biologic therapies are also indicated for other inflammatory conditions marked by high eosinophils. Efficacy of biologic therapy should be assessed after 4 to 6 months of treatment.   problem 2: Saber Sheath Trachea - TBM -get Dynamic CT - completed  - No tracheomalacia Tracheomalacia is usually acquired in adults and common causes include damage by tracheostomy or endotracheal intubation damaging the tracheal cartilage with increase risk with multiple intubations, prolonged intubation, and concurrent high dose steroid therapy; external chest wall trauma and surgery; chronic compression of the trachea by benign etiologies (eg, benign mediastinal goiter) or malignancy; relapsing polychondritis; or recurrent infection. Tracheomalacia can be asymptomatic, however signs or symptoms can develop as the severity of the airway narrowing progresses with major symptoms include dyspnea, cough, and sputum retention. Other symptoms include severe paroxysms of coughing, wheezing or stridor, barking cough and may be exacerbated by forced expiration, cough, and valsalva maneuver. Tracheomalacia is diagnosed by a bronchoscopic visualization of dynamic airway collapse on dynamic chest CT. Therapy is warranted in symptomatic patients with severe tracheomalacia and includes surgical repair as tracheobronchoplasty. The patient was referred to Dr. David Killian or Dr. Keven Jefferson, at [facility] for a surgical consult.   Problem 3: Allergies / Sinus -add Dymista 1 sniff BID - not using Environmental measures for allergies were encouraged including mattress and pillow covers, air purifier, and environmental controls.   Problem 4: GERD (s/p Agnieszka-en-Y procedure) -Rule of 2s: avoid eating too much, eating too late, eating too spicy, eating two hours before bed. -Things to avoid including overeating, spicy foods, tight clothing, eating within three hours of bed, this list is not all inclusive. -For treatment of reflux, possible options discussed including diet control, H2 blockers, PPIs, as well as coating motility agents discussed as treatment options. Timing of meals and proximity of last meal to sleep were discussed. If symptoms persist, a formal gastrointestinal evaluation is needed.   Problem 5: Low Vitamin D Low vitamin D levels have been associated with asthma exacerbations and increased allergic symptoms. The goal based on recent information is maintaining levels between 50-70 and low normal is 30. Recommended 50,000 units every two weeks to once a month depending on the level.  problem 6: prior SHARMIN -get home sleep study - pending -Sleep apnea is associated with adverse clinical consequences which can affect most organ systems. Cardiovascular disease risk includes arrhythmias, atrial fibrillation, hypertension, coronary artery disease, and stroke. Metabolic disorders include diabetes type 2, non-alcoholic fatty liver disease. Mood disorder especially depression; and cognitive decline especially in the elderly. Associations with chronic reflux/Deven's esophagus some but now all inclusive. -Reasons include arousal consistent with hypopnea; respiratory events most prominent in REM sleep or supine position; therefore sleep staging and body position are important for accurate diagnosis and estimation of AHI.   problem 7: cardiac  -recommended to continue to follow up with Cardiologist (Kelsie)    problem 9: overweight/ out of shape - had bariatric surgery -recommended Berberine OTC -Weight loss, exercise, and diet control were discussed and are highly encouraged. Treatment options are given such as, aqua therapy, and contacting a nutritionist. Recommended to use the elliptical, stationary bike, less use of treadmill.   problem 10: health maintenance -recommended yearly flu shot after October 15 -recommended strep pneumonia vaccines: Prevnar-20 vaccine, followed by Pneumo vaccine 23 one year following after 65 years old. -recommended early intervention for Upper Respiratory Infections (URIs) -recommended regular osteoporosis evaluations -recommended early dermatological evaluations -recommended after the age of 50 to the age of 70, colonoscopy every 5 years   F/U in 4 weeks He is encouraged to call with any changes, concerns, or questions

## 2024-03-14 NOTE — HISTORY OF PRESENT ILLNESS
[TextBox_4] : Mr. PALENCIA  is a 58 year old male with a history of presenting to the office today for an re-initial pulmonary evaluation. His chief complaint is   - he notes he has had asthma for about 20 years - he notes the cold air and exertion bothers his asthma - he notes his asthma Sx are wheezing, coughing, and SOB - he notes the last time he was on steroids was last week - he notes seeing Siddhartha Pena  - he notes his allergy Sx are itchy eyes and PND  - he notes before last week, it has been a long time since he has been on steroids - he notes he has been coughing and wheezing for some time but was able to resolve it without inhalers  - he notes his present cough started off dry and then started to get productive with dark yellow mucus - he notes an occasional sour taste in the mouth  - he notes his sinuses are congested  - he notes his weight has been up and down 5 lbs. from 160  - he notes his appetite is good - he notes he has been getting less sleep - he notes his snoring has gotten less heavy  - vision is stable with reading glasses - he notes his balance is good - he notes bowels are regular - he notes some ankle and leg swelling  - he notes his last sleep study was years ago - he notes not sleeping with CPAP - he notes currently taking Amoxycillin and 50 mg of Prednisone (finished 1/17 after taking for 5 days) - he notes he was on Xarelto but stopped taking it - he notes not taking any medications currently  - he notes having Covid 3 times - he notes the last time he had Covid was in 2023 - he notes having to use his inhaler due to having some labored breathing with Covid   -he denies any headaches, nausea, emesis, fever, chills, sweats, chest pain, chest pressure, palpitations, constipation, diarrhea, vertigo, dysphagia, heartburn, reflux, itchy eyes, itchy ears, leg swelling, leg pain, arthralgias, myalgias  TODAY'S VISIT 3/14/24  Mr. PALENCIA is a 58 year old male with a history of presenting to the office today for an re-initial pulmonary evaluation. His chief complaint is: -reports he has SOB after walking a block, uses his rescue inhaler with relief -reports he had bariatric surgery -reports since weight loss surgery he has not used his CPAP machine  -he denies any headaches, nausea, emesis, fever, chills, sweats, chest pain, chest pressure, palpitations, constipation, diarrhea, vertigo, dysphagia, heartburn, reflux, itchy eyes, itchy ears, leg swelling, leg pain, arthralgias, myalgias.

## 2024-03-14 NOTE — REVIEW OF SYSTEMS
[Cough] : no cough [Chest Tightness] : no chest tightness [Sputum] : no sputum [SOB on Exertion] : sob on exertion [Negative] : Endocrine

## 2024-03-14 NOTE — REASON FOR VISIT
[Follow-Up] : a follow-up visit [Asthma] : asthma [Shortness of Breath] : shortness of breath [TextBox_44] : PILO PALENCIA is being seen today for a follow up visit for Asthma, SOB, GERD, prior SHARMIN

## 2024-03-14 NOTE — PHYSICAL EXAM
[No Acute Distress] : no acute distress [Normal Oropharynx] : normal oropharynx [III] : Mallampati Class: III [Normal Appearance] : normal appearance [No Neck Mass] : no neck mass [Normal Rate/Rhythm] : normal rate/rhythm [Normal S1, S2] : normal s1, s2 [No Murmurs] : no murmurs [No Rubs] : no rubs [No Gallops] : no gallops [No Resp Distress] : no resp distress [Wheeze] : wheeze [No Abnormalities] : no abnormalities [Benign] : benign [Normal Gait] : normal gait [No Clubbing] : no clubbing [No Cyanosis] : no cyanosis [No Edema] : no edema [FROM] : FROM [Normal Color/ Pigmentation] : normal color/ pigmentation [No Focal Deficits] : no focal deficits [Oriented x3] : oriented x3 [Normal Affect] : normal affect [TextBox_68] : I:E ratio 1:3; expiratory wheezes left upper lobe

## 2024-03-20 NOTE — ED ADULT TRIAGE NOTE - PAIN: PRESENCE, MLM
[de-identified] : PHYSICAL EXAM  Constitutional:  Appears well, no apparent distress Ability to communicate: Normal Respiratory: non-labored breathing Skin: no rash noted Head: normocephalic, atraumatic Neck: no visible thyroid enlargement Eyes: extraocular movements intact Neurologic: alert and oriented x3 Psychiatric: normal mood, affect, and behavior  Lumbar Spine:  Palpation: right lumbar paraspinal spasm and right lumbar paraspinal tenderness to palpation. ROM: Diminished range of motion in all plains.  Patient notes pain with lateral bending to the right. MMT: Motor exam is 5/5 through out bilateral lower extremities. Sensation: Light touch and pain is intact throughout bilateral lower extremities. Reflexes: achilles and patella reflexes are intact and  symmetrical.  No sustained clonus. Special Testing: Positive straight leg raise on the right side.  Assessemnt: Radiculopathy of lumbosacral region (M54.17) Myalgia (M79.10)  Plan: After discussing various treatment options with the patient including but not limited to oral medications, physical therapy, exercise modalities as well as interventional spinal injections, we have decided with the following plan: The patient is presenting with acute/sub-acute radicular pain with impairment in ADLs and functionality.  The pain has not responded to conservative care including NSAID therapy and/or physical therapy.  There is no bleeding tendency, unstable medical condition, or systemic infection  The risks, benefits and alternatives of the proposed procedure were explained in detail with the patient.  The risks outlined include but are not limited to infection, bleeding, post dural puncture headache, nerve injury, a temporary increase in pain, failure to resolve symptoms, allergic reaction, symptom recurrence, and possible elevation of blood sugar.  All questions were answered to patient's satisfaction and he/she verbalized an understanding.  Follow up 1-2 weeks post injection foe re-evaluation.  Continue home exercises, stretching, activity modification, physical therapy, and conservative care.   complains of pain/discomfort

## 2024-04-04 ENCOUNTER — APPOINTMENT (OUTPATIENT)
Dept: PULMONOLOGY | Facility: CLINIC | Age: 59
End: 2024-04-04
Payer: COMMERCIAL

## 2024-04-04 VITALS
WEIGHT: 165 LBS | BODY MASS INDEX: 29.23 KG/M2 | DIASTOLIC BLOOD PRESSURE: 70 MMHG | TEMPERATURE: 97.4 F | HEIGHT: 63 IN | RESPIRATION RATE: 18 BRPM | SYSTOLIC BLOOD PRESSURE: 115 MMHG | OXYGEN SATURATION: 98 % | HEART RATE: 78 BPM

## 2024-04-04 DIAGNOSIS — R06.83 SNORING: ICD-10-CM

## 2024-04-04 DIAGNOSIS — E66.9 OBESITY, UNSPECIFIED: ICD-10-CM

## 2024-04-04 DIAGNOSIS — K21.9 GASTRO-ESOPHAGEAL REFLUX DISEASE W/OUT ESOPHAGITIS: ICD-10-CM

## 2024-04-04 DIAGNOSIS — J39.8 OTHER SPECIFIED DISEASES OF UPPER RESPIRATORY TRACT: ICD-10-CM

## 2024-04-04 DIAGNOSIS — E55.9 VITAMIN D DEFICIENCY, UNSPECIFIED: ICD-10-CM

## 2024-04-04 PROCEDURE — 95012 NITRIC OXIDE EXP GAS DETER: CPT

## 2024-04-04 PROCEDURE — 99214 OFFICE O/P EST MOD 30 MIN: CPT | Mod: 25

## 2024-04-04 RX ORDER — EPINEPHRINE 0.3 MG/.3ML
0.3 INJECTION INTRAMUSCULAR
Qty: 1 | Refills: 2 | Status: ACTIVE | COMMUNITY
Start: 2024-04-04 | End: 1900-01-01

## 2024-04-04 RX ORDER — MONTELUKAST 10 MG/1
10 TABLET, FILM COATED ORAL
Qty: 90 | Refills: 1 | Status: ACTIVE | COMMUNITY
Start: 2024-04-04 | End: 1900-01-01

## 2024-04-04 NOTE — HISTORY OF PRESENT ILLNESS
[TextBox_4] : Mr. PALENCIA  is a 59 year old male with a history of presenting to the office today for an re-initial pulmonary evaluation. His chief complaint is   -He notes not feeling too well -he uses nebulizer inhaler and finds himself coughing and wheezing -he notes irregular bowels -he notes leg and ankle swelling when he sits for too long but then it vanishes after he stands up  he notes vision is stable. -he notes some back pain when he sits as well he notes sleep is decent with around 4-5 hours but notes it is not enough. -he notes compliant with is Inhalers (Breztri)   -Patient denies any headaches, nausea, vomiting, fever, chills, sweats, chest pain, chest pressure, palpitations, coughing, wheezing, fatigue, diarrhea, constipation, dysphagia, arthralgias, dizziness, leg swelling, leg pain, itchy eyes, itchy ears, heartburn, reflux or sour taste in mouth.

## 2024-04-04 NOTE — PROCEDURE
[FreeTextEntry1] :  FENO was 13; a normal value being less than 25Fractional exhaled nitric oxide (FENO) is regarded as a simple, noninvasive method for assessing eosinophilic airway inflammation. Produced by a variety of cells within the lung, nitric oxide (NO) concentrations are generally low in healthy individuals. However, high concentrations of NO appear to be involved in nonspecific host defense mechanisms and chronic inflammatory diseases such as asthma. The American Thoracic Society (ATS) therefore has strongly recommended using FENO to aid in the assessment, management, and long-term monitoring of eosinophilic airway inflammation and asthma, and for identifying steroid responsive individuals whose chronic respiratory symptoms may be caused by airway inflammation. In their 2011 clinical practice guideline, the ATS emphasizes the importance of using FENO.

## 2024-04-04 NOTE — ASSESSMENT
[FreeTextEntry1] : Mr. 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), SHARMIN, Covid-19 x3  who now comes to the office for a re-initial pulmonary evaluation for SOB, Severe Persistent Asthma (Active s/p URI), ?TBM (prior saber sheath trachea(resolved 2024)), Allergies / Sinus, GERD, prior SHARMIN   His shortness of breath is multifactorial due to: -poor mechanics of breathing -out of shape -over weight -pulmonary disease   -Severe Persistent Asthma   -Saber Sheath Trachea - ?TBM   problem 1: Severe Persistent Asthma (Steroid Dependent prior) - Active s/p URI -continue Albuterol via nebulizer, Q6H -continue Breztri 2 puffs BID with spacer -add Singulair 10 mg QHS -s/p Prednisone 30 mg x 5 days, 20 mg x 5 days, 10 mg x 5 days 1//2024 -Inhaler technique reviewed as well as oral hygiene techniques reviewed with patient. Avoidance of cold air, extremes of temperature, rescue inhaler should be used before exercise. Order of medication reviewed with patient. Recommended use of a cool mist humidifier in the bedroom. - Asthma is believed to be caused by inherited (genetic) and environmental factor, but its exact cause is unknown.  - Asthma may be triggered by allergens, lung infections, or irritants in the air. Asthma triggers are different for each person  problem 1A: Biologic Evaluation -s/p Blood work to include: asthma panel, food IgE panel, IgE level, eosinophil level, vitamin D level -Move to [facility] 3/2024 -Type 2 asthma, which accounts for approximately 70% of all cases, is indicated by high eosinophil counts and elevated levels of T2 cytokines. Six biologic agents are FDA approved to treat moderate to severe asthma by targeting various cytokines and cell surface receptors involved in the inflammatory process in asthma. Several biologic therapies are also indicated for other inflammatory conditions marked by high eosinophils. Efficacy of biologic therapy should be assessed after 4 to 6 months of treatment.   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 Tracheomalacia can be asymptomatic, however signs or symptoms can develop as the severity of the airway narrowing progresses with major symptoms include dyspnea, cough, and sputum retention. Other symptoms include severe paroxysms of coughing, wheezing or stridor, barking cough and may be exacerbated by forced expiration, cough, and valsalva maneuver. Tracheomalacia is diagnosed by a bronchoscopic visualization of dynamic airway collapse on dynamic chest CT. Therapy is warranted in symptomatic patients with severe tracheomalacia and includes surgical repair as tracheobronchoplasty. The patient was referred to Nick/Laureen   Problem 3: Allergies / Sinus -add Dymista 1 sniff BID Environmental measures for allergies were encouraged including mattress and pillow covers, air purifier, and environmental controls.   Problem 4: GERD (s/p Agnieszka-en-Y procedure) -Rule of 2s: avoid eating too much, eating too late, eating too spicy, eating two hours before bed. -Things to avoid including overeating, spicy foods, tight clothing, eating within three hours of bed, this list is not all inclusive. -For treatment of reflux, possible options discussed including diet control, H2 blockers, PPIs, as well as coating motility agents discussed as treatment options. Timing of meals and proximity of last meal to sleep were discussed. If symptoms persist, a formal gastrointestinal evaluation is needed.   Problem 5: Low Vitamin D Low vitamin D levels have been associated with asthma exacerbations and increased allergic symptoms. The goal based on recent information is maintaining levels between 50-70 and low normal is 30. Recommended 50,000 units every two weeks to once a month depending on the level.  problem 6: prior SHARMIN -get home sleep study ([place]) -Sleep apnea is associated with adverse clinical consequences which can affect most organ systems. Cardiovascular disease risk includes arrhythmias, atrial fibrillation, hypertension, coronary artery disease, and stroke. Metabolic disorders include diabetes type 2, non-alcoholic fatty liver disease. Mood disorder especially depression; and cognitive decline especially in the elderly. Associations with chronic reflux/Deven's esophagus some but now all inclusive. -Reasons include arousal consistent with hypopnea; respiratory events most prominent in REM sleep or supine position; therefore sleep staging and body position are important for accurate diagnosis and estimation of AHI.   problem 7: cardiac  -recommended to continue to follow up with Cardiologist (Annette)   problem 8: poor breathing mechanics -Proper breathing techniques were reviewed with an emphasis of exhalation. Patient instructed to breath in for 1 second and out for four seconds. Patient was encouraged to not talk while walking.   problem 9: overweight/ out of shape -recommended Berberine OTC -Weight loss, exercise, and diet control were discussed and are highly encouraged. Treatment options are given such as, aqua therapy, and contacting a nutritionist. Recommended to use the elliptical, stationary bike, less use of treadmill.   problem 10: health maintenance -recommended yearly flu shot after October 15 -recommended strep pneumonia vaccines: Prevnar-20 vaccine, followed by Pneumo vaccine 23 one year following after 65 years old. -recommended early intervention for Upper Respiratory Infections (URIs) -recommended regular osteoporosis evaluations -recommended early dermatological evaluations -recommended after the age of 50 to the age of 70, colonoscopy every 5 years   F/U in 4-6 months He is encouraged to call with any changes, concerns, or questions

## 2024-04-04 NOTE — ADDENDUM
[FreeTextEntry1] :  Documented by Chevy Coates acting as a scribe for Dr. Villa Coates on 04/04/2024 .   All medical record entries made by the Scribe were at my, Dr. Villa Coates's direction and personally dictated by me on 04/04/2024 . I have reviewed the chart and agree that the record accurately reflects my personal performance of the history, Physical exam, assessment, and plan. I have also personally directed, reviewed, and agree with the discharge instructions.

## 2024-04-05 ENCOUNTER — APPOINTMENT (OUTPATIENT)
Dept: PULMONOLOGY | Facility: CLINIC | Age: 59
End: 2024-04-05
Payer: COMMERCIAL

## 2024-04-05 ENCOUNTER — APPOINTMENT (OUTPATIENT)
Dept: PULMONOLOGY | Facility: CLINIC | Age: 59
End: 2024-04-05

## 2024-04-05 VITALS
BODY MASS INDEX: 29.23 KG/M2 | OXYGEN SATURATION: 98 % | SYSTOLIC BLOOD PRESSURE: 112 MMHG | WEIGHT: 165 LBS | DIASTOLIC BLOOD PRESSURE: 68 MMHG | TEMPERATURE: 97.9 F | HEART RATE: 89 BPM | RESPIRATION RATE: 16 BRPM | HEIGHT: 63 IN

## 2024-04-05 PROCEDURE — 96372 THER/PROPH/DIAG INJ SC/IM: CPT

## 2024-04-05 RX ORDER — TEZEPELUMAB-EKKO 210 MG/1.9ML
210 INJECTION, SOLUTION SUBCUTANEOUS
Qty: 0 | Refills: 0 | Status: COMPLETED | OUTPATIENT
Start: 2024-04-05

## 2024-04-16 ENCOUNTER — EMERGENCY (EMERGENCY)
Facility: HOSPITAL | Age: 59
LOS: 1 days | Discharge: HOME | End: 2024-04-16
Attending: STUDENT IN AN ORGANIZED HEALTH CARE EDUCATION/TRAINING PROGRAM | Admitting: STUDENT IN AN ORGANIZED HEALTH CARE EDUCATION/TRAINING PROGRAM
Payer: COMMERCIAL

## 2024-04-16 VITALS
OXYGEN SATURATION: 97 % | SYSTOLIC BLOOD PRESSURE: 99 MMHG | RESPIRATION RATE: 17 BRPM | TEMPERATURE: 98 F | DIASTOLIC BLOOD PRESSURE: 68 MMHG | HEART RATE: 63 BPM

## 2024-04-16 VITALS
HEART RATE: 60 BPM | RESPIRATION RATE: 18 BRPM | SYSTOLIC BLOOD PRESSURE: 114 MMHG | OXYGEN SATURATION: 98 % | DIASTOLIC BLOOD PRESSURE: 73 MMHG | TEMPERATURE: 98 F

## 2024-04-16 DIAGNOSIS — Z98.84 BARIATRIC SURGERY STATUS: Chronic | ICD-10-CM

## 2024-04-16 DIAGNOSIS — Z98.890 OTHER SPECIFIED POSTPROCEDURAL STATES: Chronic | ICD-10-CM

## 2024-04-16 DIAGNOSIS — Z90.49 ACQUIRED ABSENCE OF OTHER SPECIFIED PARTS OF DIGESTIVE TRACT: Chronic | ICD-10-CM

## 2024-04-16 DIAGNOSIS — Z98.89 OTHER SPECIFIED POSTPROCEDURAL STATES: Chronic | ICD-10-CM

## 2024-04-16 DIAGNOSIS — Z90.89 ACQUIRED ABSENCE OF OTHER ORGANS: Chronic | ICD-10-CM

## 2024-04-16 LAB
ALBUMIN SERPL ELPH-MCNC: 3.7 G/DL — SIGNIFICANT CHANGE UP (ref 3.3–5)
ALP SERPL-CCNC: 89 U/L — SIGNIFICANT CHANGE UP (ref 40–120)
ALT FLD-CCNC: 21 U/L — SIGNIFICANT CHANGE UP (ref 4–41)
ANION GAP SERPL CALC-SCNC: 8 MMOL/L — SIGNIFICANT CHANGE UP (ref 7–14)
APPEARANCE UR: CLEAR — SIGNIFICANT CHANGE UP
AST SERPL-CCNC: 22 U/L — SIGNIFICANT CHANGE UP (ref 4–40)
BACTERIA # UR AUTO: NEGATIVE /HPF — SIGNIFICANT CHANGE UP
BASE EXCESS BLDV CALC-SCNC: 3.8 MMOL/L — HIGH (ref -2–3)
BASOPHILS # BLD AUTO: 0.03 K/UL — SIGNIFICANT CHANGE UP (ref 0–0.2)
BASOPHILS NFR BLD AUTO: 0.6 % — SIGNIFICANT CHANGE UP (ref 0–2)
BILIRUB SERPL-MCNC: 0.4 MG/DL — SIGNIFICANT CHANGE UP (ref 0.2–1.2)
BILIRUB UR-MCNC: NEGATIVE — SIGNIFICANT CHANGE UP
BLOOD GAS VENOUS COMPREHENSIVE RESULT: SIGNIFICANT CHANGE UP
BUN SERPL-MCNC: 11 MG/DL — SIGNIFICANT CHANGE UP (ref 7–23)
CALCIUM SERPL-MCNC: 8.6 MG/DL — SIGNIFICANT CHANGE UP (ref 8.4–10.5)
CAST: 0 /LPF — SIGNIFICANT CHANGE UP (ref 0–4)
CHLORIDE BLDV-SCNC: 104 MMOL/L — SIGNIFICANT CHANGE UP (ref 96–108)
CHLORIDE SERPL-SCNC: 105 MMOL/L — SIGNIFICANT CHANGE UP (ref 98–107)
CO2 BLDV-SCNC: 33.4 MMOL/L — HIGH (ref 22–26)
CO2 SERPL-SCNC: 28 MMOL/L — SIGNIFICANT CHANGE UP (ref 22–31)
COLOR SPEC: YELLOW — SIGNIFICANT CHANGE UP
CREAT SERPL-MCNC: 0.62 MG/DL — SIGNIFICANT CHANGE UP (ref 0.5–1.3)
DIFF PNL FLD: NEGATIVE — SIGNIFICANT CHANGE UP
EGFR: 109 ML/MIN/1.73M2 — SIGNIFICANT CHANGE UP
EOSINOPHIL # BLD AUTO: 0.09 K/UL — SIGNIFICANT CHANGE UP (ref 0–0.5)
EOSINOPHIL NFR BLD AUTO: 1.9 % — SIGNIFICANT CHANGE UP (ref 0–6)
GAS PNL BLDV: 137 MMOL/L — SIGNIFICANT CHANGE UP (ref 136–145)
GLUCOSE BLDV-MCNC: 98 MG/DL — SIGNIFICANT CHANGE UP (ref 70–99)
GLUCOSE SERPL-MCNC: 100 MG/DL — HIGH (ref 70–99)
GLUCOSE UR QL: NEGATIVE MG/DL — SIGNIFICANT CHANGE UP
HCO3 BLDV-SCNC: 32 MMOL/L — HIGH (ref 22–29)
HCT VFR BLD CALC: 43.8 % — SIGNIFICANT CHANGE UP (ref 39–50)
HCT VFR BLDA CALC: 44 % — SIGNIFICANT CHANGE UP (ref 39–51)
HGB BLD CALC-MCNC: 14.5 G/DL — SIGNIFICANT CHANGE UP (ref 12.6–17.4)
HGB BLD-MCNC: 14.2 G/DL — SIGNIFICANT CHANGE UP (ref 13–17)
IANC: 1.92 K/UL — SIGNIFICANT CHANGE UP (ref 1.8–7.4)
IMM GRANULOCYTES NFR BLD AUTO: 0.2 % — SIGNIFICANT CHANGE UP (ref 0–0.9)
KETONES UR-MCNC: NEGATIVE MG/DL — SIGNIFICANT CHANGE UP
LACTATE BLDV-MCNC: 1.8 MMOL/L — SIGNIFICANT CHANGE UP (ref 0.5–2)
LEUKOCYTE ESTERASE UR-ACNC: NEGATIVE — SIGNIFICANT CHANGE UP
LIDOCAIN IGE QN: 50 U/L — SIGNIFICANT CHANGE UP (ref 7–60)
LYMPHOCYTES # BLD AUTO: 2.37 K/UL — SIGNIFICANT CHANGE UP (ref 1–3.3)
LYMPHOCYTES # BLD AUTO: 49 % — HIGH (ref 13–44)
MCHC RBC-ENTMCNC: 25.9 PG — LOW (ref 27–34)
MCHC RBC-ENTMCNC: 32.4 GM/DL — SIGNIFICANT CHANGE UP (ref 32–36)
MCV RBC AUTO: 79.9 FL — LOW (ref 80–100)
MONOCYTES # BLD AUTO: 0.42 K/UL — SIGNIFICANT CHANGE UP (ref 0–0.9)
MONOCYTES NFR BLD AUTO: 8.7 % — SIGNIFICANT CHANGE UP (ref 2–14)
NEUTROPHILS # BLD AUTO: 1.92 K/UL — SIGNIFICANT CHANGE UP (ref 1.8–7.4)
NEUTROPHILS NFR BLD AUTO: 39.6 % — LOW (ref 43–77)
NITRITE UR-MCNC: NEGATIVE — SIGNIFICANT CHANGE UP
NRBC # BLD: 0 /100 WBCS — SIGNIFICANT CHANGE UP (ref 0–0)
NRBC # FLD: 0 K/UL — SIGNIFICANT CHANGE UP (ref 0–0)
PCO2 BLDV: 60 MMHG — HIGH (ref 42–55)
PH BLDV: 7.33 — SIGNIFICANT CHANGE UP (ref 7.32–7.43)
PH UR: 7 — SIGNIFICANT CHANGE UP (ref 5–8)
PLATELET # BLD AUTO: 194 K/UL — SIGNIFICANT CHANGE UP (ref 150–400)
PO2 BLDV: 22 MMHG — LOW (ref 25–45)
POTASSIUM BLDV-SCNC: 4.7 MMOL/L — SIGNIFICANT CHANGE UP (ref 3.5–5.1)
POTASSIUM SERPL-MCNC: 4.5 MMOL/L — SIGNIFICANT CHANGE UP (ref 3.5–5.3)
POTASSIUM SERPL-SCNC: 4.5 MMOL/L — SIGNIFICANT CHANGE UP (ref 3.5–5.3)
PROT SERPL-MCNC: 6.8 G/DL — SIGNIFICANT CHANGE UP (ref 6–8.3)
PROT UR-MCNC: NEGATIVE MG/DL — SIGNIFICANT CHANGE UP
RBC # BLD: 5.48 M/UL — SIGNIFICANT CHANGE UP (ref 4.2–5.8)
RBC # FLD: 13.3 % — SIGNIFICANT CHANGE UP (ref 10.3–14.5)
RBC CASTS # UR COMP ASSIST: 1 /HPF — SIGNIFICANT CHANGE UP (ref 0–4)
SAO2 % BLDV: 24.9 % — LOW (ref 67–88)
SODIUM SERPL-SCNC: 141 MMOL/L — SIGNIFICANT CHANGE UP (ref 135–145)
SP GR SPEC: 1.02 — SIGNIFICANT CHANGE UP (ref 1–1.03)
SQUAMOUS # UR AUTO: 0 /HPF — SIGNIFICANT CHANGE UP (ref 0–5)
UROBILINOGEN FLD QL: 1 MG/DL — SIGNIFICANT CHANGE UP (ref 0.2–1)
WBC # BLD: 4.84 K/UL — SIGNIFICANT CHANGE UP (ref 3.8–10.5)
WBC # FLD AUTO: 4.84 K/UL — SIGNIFICANT CHANGE UP (ref 3.8–10.5)
WBC UR QL: 1 /HPF — SIGNIFICANT CHANGE UP (ref 0–5)

## 2024-04-16 PROCEDURE — 99285 EMERGENCY DEPT VISIT HI MDM: CPT

## 2024-04-16 PROCEDURE — 74177 CT ABD & PELVIS W/CONTRAST: CPT | Mod: 26,MC

## 2024-04-16 RX ORDER — IOHEXOL 300 MG/ML
30 INJECTION, SOLUTION INTRAVENOUS ONCE
Refills: 0 | Status: COMPLETED | OUTPATIENT
Start: 2024-04-16 | End: 2024-04-16

## 2024-04-16 RX ORDER — SODIUM CHLORIDE 9 MG/ML
1000 INJECTION INTRAMUSCULAR; INTRAVENOUS; SUBCUTANEOUS ONCE
Refills: 0 | Status: COMPLETED | OUTPATIENT
Start: 2024-04-16 | End: 2024-04-16

## 2024-04-16 RX ORDER — ACETAMINOPHEN 500 MG
1000 TABLET ORAL ONCE
Refills: 0 | Status: COMPLETED | OUTPATIENT
Start: 2024-04-16 | End: 2024-04-16

## 2024-04-16 RX ORDER — MORPHINE SULFATE 50 MG/1
4 CAPSULE, EXTENDED RELEASE ORAL ONCE
Refills: 0 | Status: DISCONTINUED | OUTPATIENT
Start: 2024-04-16 | End: 2024-04-16

## 2024-04-16 RX ADMIN — SODIUM CHLORIDE 1000 MILLILITER(S): 9 INJECTION INTRAMUSCULAR; INTRAVENOUS; SUBCUTANEOUS at 15:39

## 2024-04-16 RX ADMIN — Medication 400 MILLIGRAM(S): at 19:34

## 2024-04-16 RX ADMIN — MORPHINE SULFATE 4 MILLIGRAM(S): 50 CAPSULE, EXTENDED RELEASE ORAL at 15:39

## 2024-04-16 RX ADMIN — IOHEXOL 30 MILLILITER(S): 300 INJECTION, SOLUTION INTRAVENOUS at 16:29

## 2024-04-16 NOTE — CONSULT NOTE ADULT - ASSESSMENT
59M w/ PMH sleeve to Mohinder-En-Y presents for abdominal pain and BRBPR, with CT scan unremarkable.    -No acute surgical intervention  -GI consult for BRBPR  -Banana bang in ED  -Dispo per ED    *NOTE INCOMPLETE    Dannie Guillen MD  PGY-3  B Team Surgery 59M w/ PMH sleeve to Mohinder-En-Y presents for abdominal pain and BRBPR, with CT scan unremarkable.    -No acute surgical intervention  -GI consult for BRBPR, possible etiologies include marginal ulcer  -Banana bang in ED  -Dispo per ED    *NOTE INCOMPLETE    Dannie Guillen MD  PGY-3  B Team Surgery 59M w/ PMH sleeve to Mohinder-En-Y presents for abdominal pain and BRBPR, with CT scan unremarkable.    -No acute surgical intervention  -GI consult for BRBPR, possible etiologies include marginal ulcer  -Carafate and PPI  -Banana bang in ED  -Dispo per ED    D/w Dr. Kelly Guillen MD  PGY-3  B Team Surgery

## 2024-04-16 NOTE — ED PROVIDER NOTE - PHYSICAL EXAMINATION
Gen: well appearing, NAD  HEENT: no conjunctivitis  Cardiac: regular rate rhythm, normal S1S2  Chest: CTA BL, no wheeze or crackles  Abdomen: normal BS, soft, + RLQ and hypogastric tenderness to palpation  Extremity: no gross deformity, good perfusion  Skin: no rash  Neuro: grossly normal Gen: well appearing, NAD  HEENT: no conjunctivitis  Cardiac: regular rate rhythm, normal S1S2  Chest: CTA BL, no wheeze or crackles  Abdomen: normal BS, soft, + RLQ and suprapubic tenderness to palpation  Extremity: no gross deformity, good perfusion  Skin: no rash  Neuro: grossly normal

## 2024-04-16 NOTE — ED ADULT TRIAGE NOTE - CHIEF COMPLAINT QUOTE
patient c/o bright red blood in stool and abdominal pain x1 week. patient endorsing fatigue. past medical history of asthma, colon polyps

## 2024-04-16 NOTE — ED ADULT NURSE NOTE - OBJECTIVE STATEMENT
break coverage rn. pt A&Ox4 amb at baseline. pmh of asthma, colon polyps. pt c/p x4 years abdominal pain associated with priods of rectal bleeding and nausea wuithout vomting. states has seen GI doctors in the past but they have not figured out a diagnosis. pt  for x1 week has been experiencing periods of rectal bleed and abdominal pain. denies fevers, chills, CP, SOB, vomiting. 20g to R AC placed, labs sent, medicated, safgety maintained

## 2024-04-16 NOTE — ED PROVIDER NOTE - OBJECTIVE STATEMENT
59-year-old male with PMH asthma, HTN, paroxysmal A-fib on Xarelto, GERD,, ASHUTOSH, gastric sleeve and Mohinder-en-Y about 25 years ago, laparoscopic reduction of internal hernia and closure of defect in March 2023 with Dr. Humberto Romero and Dr. Jhon Navas presents with continued abdominal pain worse in the past 2 days to 1 week associated with antibiotic and months of bright red blood per rectum when he stools.  Patient stools 3-4 times a day but is not present every time.  Denies lightheadedness, chest pain, shortness of breath, fevers, chills, dysuria.  Patient has been taking Tylenol 3 for the pain last dose was 11 AM.  Patient states that he also had an MRI and CT scan 2 years ago in [place] for the same issue and follows with Dr. Dilip Howell who is a GI doctor.  Patient had a colonoscopy 1 year ago that showed benign polyps.  Patient states that he also had an upper endoscopy.  Patient states that he has some bacteria in his intestines and was prescribed rifaximin for this. 59-year-old male with PMH asthma, HTN, paroxysmal A-fib on Xarelto, GERD,, ASHUTOSH, gastric sleeve and Mohinder-en-Y about 25 years ago, laparoscopic reduction of internal hernia and closure of defect in March 2023 with Dr. Humberto Romero and Dr. Jhon Navas presents with continued abdominal pain worse in the past 2 days to 1 week associated with months of bright red blood per rectum when he stools.  Patient stools 3-4 times a day but is not present every time.  Denies lightheadedness, chest pain, shortness of breath, fevers, chills, dysuria.  Patient has been taking Tylenol for the pain last dose was 11 AM.  Patient states that he also had an MRI and CT scan 2 years ago in [place] for the same issue and follows with Dr. Dilip Howell who is a GI doctor.  Patient had a colonoscopy 1 year ago that showed benign polyps.  Patient states that he also had an upper endoscopy at the time.  Patient states that he has 'some bacteria in his intestines' and was prescribed rifaximin for this. 59-year-old male with PMH asthma, HTN, paroxysmal A-fib on Xarelto, GERD,, ASHUTOSH, gastric sleeve and Mohinder-en-Y about 25 years ago, laparoscopic reduction of internal hernia and closure of defect in March 2023 with Dr. Humberto Romero and Dr. Jhon Navas presents with continued abdominal pain worse in the past 2 days to 1 week associated with months of bright red blood per rectum when he stools.  Patient stools 3-4 times a day but is not present every time.  Denies lightheadedness, chest pain, shortness of breath, fevers, chills, dysuria.  Patient has been taking Tylenol for the pain last dose was 11 AM.  Patient states that he also had an MRI and CT scan 2 years ago in [place] for the same issue and follows with Dr. Dilip Howell who is a GI doctor.  Patient had a colonoscopy 1 year ago that showed benign polyps.  Patient states that he also had an upper endoscopy at the time.  Patient states that he has 'some bacteria in his intestines' and was prescribed rifaximin for this.    Bariatric Surgeon Dr Jorge Lafleur ([facility]) done in [facility] ~2015.

## 2024-04-16 NOTE — ED ADULT NURSE REASSESSMENT NOTE - NS ED NURSE REASSESS COMMENT FT1
RW pt ambulatory to [facility], coming with ABD pain with some blood in the stool, pt stated he had similar symptoms in the past.  pain meds given. pending dispo.    Cayla Hill RN

## 2024-04-16 NOTE — ED PROVIDER NOTE - PATIENT PORTAL LINK FT
You can access the FollowMyHealth Patient Portal offered by [facility] by registering at the following website: [email or web address]. By joining [facility]’s FollowMyHealth portal, you will also be able to view your health information using other applications (apps) compatible with our system.

## 2024-04-16 NOTE — CONSULT NOTE ADULT - SUBJECTIVE AND OBJECTIVE BOX
59M w/ PMH Afib on Xarelto, lap sleeve converted to Mohinder-En-Y (Dr. Lafleur, 2015) c/b internal hernia requiring laparoscopic reduction and closure of Carlton's defect (Dr. Lepe, Dr. Romero 2023) who presents to the ED w/ BRBPR for several months.    In the ED HDS. Labs unremarkable CT A/P w/ IV and PO unremarkable. The patient endorses he is hungry. He endorses he had a recent colonoscopy a year ago which showed he had benign polyps (cannot find records in chart).    Was given Rifaximin for unknown etiology, possibly Blind Loop Syndrome however CT scans without evidence of afferent loop bowl dilation noted.    PMH: As above    PSH: As above    ROS: + hematochezia    Allergies: Avelox    Physical exam:    BP 99/68 HR 63 SPO2 97 T 97.5 RR 17    General- Younger man, NAD  HEENT- Normocephalic, atraumatic  Abdomen- Soft, non tender. Well healed port sites   Lungs- Comfortable on room air  Extremities- Symmetric, no swelling  Vascular: Palpable radial pulse, good cap refill  Neuro- CN intact    Imaging:    ACC: [id number] EXAM:  CT ABDOMEN AND PELVIS OC IC   ORDERED BY: JULIANNA JONES     PROCEDURE DATE:  04/16/2024          INTERPRETATION:  CLINICAL INFORMATION: Right lower quadrant and   hypogastric tenderness. History of hernia, Mohinder-en-Y and gastric sleeve.    COMPARISON: CT abdomen and pelvis 3/21/2023.    CONTRAST/COMPLICATIONS:  IV Contrast: Omnipaque 350  90 cc administered   10 cc discarded  Oral Contrast: Omnipaque 300   Fruit 2o  Complications: None reported at time of study completion    PROCEDURE:  CT of the Abdomen and Pelvis was performed.  Sagittal and coronal reformats were performed.    FINDINGS:  LOWER CHEST: Within normal limits.    LIVER: Atrophic lateral left lobe, stable.  BILE DUCTS: Normal caliber.  GALLBLADDER: Within normal limits.  SPLEEN: Within normal limits.  PANCREAS: Within normal limits.  ADRENALS: Within normal limits.  KIDNEYS/URETERS: A subcentimeter left renal hypodensity that is too small   to characterize, stable.    BLADDER: Within normal limits.  REPRODUCTIVE ORGANS: Normal sized prostate with brachytherapy seeds.    BOWEL: A small hiatal hernia. Evidence of prior gastric sleeve. Status   post Mohinder-en-Y. No bowel obstruction. Appendix is not visualized. No   evidence of inflammation in the pericecal region.  PERITONEUM: No ascites.  VESSELS: Atherosclerotic changes.  RETROPERITONEUM/LYMPH NODES: No lymphadenopathy.  ABDOMINAL WALL: Within normal limits.  BONES: Degenerative changes.    IMPRESSION:  No CT evidence of acute intra-abdominal pathology.    --- End of Report ---            NBA KHAN MD; Attending Radiologist  This document has been electronically signed. Apr 16 2024  7:01PM 59M w/ PMH Afib on Xarelto, lap sleeve converted to Mohinder-En-Y (Dr. Lafleur, 2015) c/b internal hernia requiring laparoscopic reduction and closure of Carlton's defect (Dr. Lepe, Dr. Romero 2023), lymphadenopathy of unknown etiology who presents to the ED w/ BRBPR for several months.    In the ED HDS. Labs unremarkable CT A/P w/ IV and PO unremarkable. The patient endorses he is hungry. He endorses he had a recent colonoscopy a year ago which showed he had benign polyps (cannot find records in chart).    Was given Rifaximin for unknown etiology, possibly Blind Loop Syndrome however CT scans without evidence of afferent loop bowl dilation noted.    PMH: As above    PSH: As above    ROS: + hematochezia    Allergies: Avelox    Physical exam:    BP 99/68 HR 63 SPO2 97 T 97.5 RR 17    General- Younger man, NAD  HEENT- Normocephalic, atraumatic  Abdomen- Soft, non tender. Well healed port sites   - Open incision c/d/i along ASIS to pubic tubercle. SANJEEV unremarkable  Lungs- Comfortable on room air  Extremities- Symmetric, no swelling  Vascular: Palpable radial pulse, good cap refill  Neuro- CN intact    Imaging:    ACC: [id number] EXAM:  CT ABDOMEN AND PELVIS OC IC   ORDERED BY: JULIANNA JONES     PROCEDURE DATE:  04/16/2024          INTERPRETATION:  CLINICAL INFORMATION: Right lower quadrant and   hypogastric tenderness. History of hernia, Mohinder-en-Y and gastric sleeve.    COMPARISON: CT abdomen and pelvis 3/21/2023.    CONTRAST/COMPLICATIONS:  IV Contrast: Omnipaque 350  90 cc administered   10 cc discarded  Oral Contrast: Omnipaque 300   Fruit 2o  Complications: None reported at time of study completion    PROCEDURE:  CT of the Abdomen and Pelvis was performed.  Sagittal and coronal reformats were performed.    FINDINGS:  LOWER CHEST: Within normal limits.    LIVER: Atrophic lateral left lobe, stable.  BILE DUCTS: Normal caliber.  GALLBLADDER: Within normal limits.  SPLEEN: Within normal limits.  PANCREAS: Within normal limits.  ADRENALS: Within normal limits.  KIDNEYS/URETERS: A subcentimeter left renal hypodensity that is too small   to characterize, stable.    BLADDER: Within normal limits.  REPRODUCTIVE ORGANS: Normal sized prostate with brachytherapy seeds.    BOWEL: A small hiatal hernia. Evidence of prior gastric sleeve. Status   post Mohinder-en-Y. No bowel obstruction. Appendix is not visualized. No   evidence of inflammation in the pericecal region.  PERITONEUM: No ascites.  VESSELS: Atherosclerotic changes.  RETROPERITONEUM/LYMPH NODES: No lymphadenopathy.  ABDOMINAL WALL: Within normal limits.  BONES: Degenerative changes.    IMPRESSION:  No CT evidence of acute intra-abdominal pathology.    --- End of Report ---            NBA KHAN MD; Attending Radiologist  This document has been electronically signed. Apr 16 2024  7:01PM 59M w/ PMH Afib on Xarelto, lap sleeve converted to Mohinder-En-Y (Dr. Lafleur, 2015) c/b internal hernia requiring laparoscopic reduction and closure of Carlton's defect (Dr. Lepe, Dr. Romero 2023), lymphadenopathy of unknown etiology (ruled out malignancy by Heme/Onc) who presents to the ED w/ BRBPR for several months.    In the ED HDS. Labs unremarkable CT A/P w/ IV and PO unremarkable. The patient endorses he is hungry. He endorses he had a recent colonoscopy a year ago which showed he had benign polyps (cannot find records in chart).    Was given Rifaximin for unknown etiology, possibly Blind Loop Syndrome however CT scans without evidence of afferent loop bowl dilation noted. Does not take a PPI, nor does he take Xarelto anymore.    PMH: As above    PSH: As above    ROS: + hematochezia    Allergies: Avelox    Physical exam:    BP 99/68 HR 63 SPO2 97 T 97.5 RR 17    General- Younger man, NAD  HEENT- Normocephalic, atraumatic  Abdomen- Soft, non tender. Well healed port sites   - Open incision c/d/i along ASIS to pubic tubercle, bilaterally. SANJEEV unremarkable, no hemorrhoids or masses, no blood on glove  Lungs- Comfortable on room air  Extremities- Symmetric, no swelling  Vascular: Palpable radial pulse, good cap refill  Neuro- CN intact    Imaging:    ACC: [id number] EXAM:  CT ABDOMEN AND PELVIS OC IC   ORDERED BY: JULIANNA JONES     PROCEDURE DATE:  04/16/2024          INTERPRETATION:  CLINICAL INFORMATION: Right lower quadrant and   hypogastric tenderness. History of hernia, Mohinder-en-Y and gastric sleeve.    COMPARISON: CT abdomen and pelvis 3/21/2023.    CONTRAST/COMPLICATIONS:  IV Contrast: Omnipaque 350  90 cc administered   10 cc discarded  Oral Contrast: Omnipaque 300   Fruit 2o  Complications: None reported at time of study completion    PROCEDURE:  CT of the Abdomen and Pelvis was performed.  Sagittal and coronal reformats were performed.    FINDINGS:  LOWER CHEST: Within normal limits.    LIVER: Atrophic lateral left lobe, stable.  BILE DUCTS: Normal caliber.  GALLBLADDER: Within normal limits.  SPLEEN: Within normal limits.  PANCREAS: Within normal limits.  ADRENALS: Within normal limits.  KIDNEYS/URETERS: A subcentimeter left renal hypodensity that is too small   to characterize, stable.    BLADDER: Within normal limits.  REPRODUCTIVE ORGANS: Normal sized prostate with brachytherapy seeds.    BOWEL: A small hiatal hernia. Evidence of prior gastric sleeve. Status   post Mohinder-en-Y. No bowel obstruction. Appendix is not visualized. No   evidence of inflammation in the pericecal region.  PERITONEUM: No ascites.  VESSELS: Atherosclerotic changes.  RETROPERITONEUM/LYMPH NODES: No lymphadenopathy.  ABDOMINAL WALL: Within normal limits.  BONES: Degenerative changes.    IMPRESSION:  No CT evidence of acute intra-abdominal pathology.    --- End of Report ---            NBA KHAN MD; Attending Radiologist  This document has been electronically signed. Apr 16 2024  7:01PM 59M w/ PMH Afib on Xarelto, external thrombosed hemorrhoids (Dr. Allen, conservative management OP) lap sleeve converted to Mohinder-En-Y (Dr. Lafleur, 2015) c/b internal hernia requiring laparoscopic reduction and closure of Carlton's defect (Dr. Lepe, Dr. Romero 2023), lymphadenopathy of unknown etiology (ruled out malignancy by Heme/Onc) who presents to the ED w/ BRBPR for several months.    In the ED HDS. Labs unremarkable CT A/P w/ IV and PO unremarkable. The patient endorses he is hungry. He endorses he had a recent colonoscopy a year ago which showed he had benign polyps (cannot find records in chart). [employer] records reviewed, patient has been seen by Dr. Benson in 2023 for similar complaints.    Was given Rifaximin for unknown etiology, possibly Blind Loop Syndrome however CT scans without evidence of afferent loop bowl dilation noted. Does not take a PPI, nor does he take Xarelto anymore.     PMH: As above    PSH: As above    ROS: + hematochezia    Allergies: Avelox    Physical exam:    BP 99/68 HR 63 SPO2 97 T 97.5 RR 17    General- Younger man, NAD  HEENT- Normocephalic, atraumatic  Abdomen- Soft, non tender. Well healed port sites   - Open incision c/d/i along ASIS to pubic tubercle, bilaterally. SANJEEV unremarkable, no hemorrhoids or masses, no blood on glove  Lungs- Comfortable on room air  Extremities- Symmetric, no swelling  Vascular: Palpable radial pulse, good cap refill  Neuro- CN intact    Imaging:    ACC: [id number] EXAM:  CT ABDOMEN AND PELVIS OC IC   ORDERED BY: JULIANNA JONES     PROCEDURE DATE:  04/16/2024          INTERPRETATION:  CLINICAL INFORMATION: Right lower quadrant and   hypogastric tenderness. History of hernia, Mohinder-en-Y and gastric sleeve.    COMPARISON: CT abdomen and pelvis 3/21/2023.    CONTRAST/COMPLICATIONS:  IV Contrast: Omnipaque 350  90 cc administered   10 cc discarded  Oral Contrast: Omnipaque 300   Fruit 2o  Complications: None reported at time of study completion    PROCEDURE:  CT of the Abdomen and Pelvis was performed.  Sagittal and coronal reformats were performed.    FINDINGS:  LOWER CHEST: Within normal limits.    LIVER: Atrophic lateral left lobe, stable.  BILE DUCTS: Normal caliber.  GALLBLADDER: Within normal limits.  SPLEEN: Within normal limits.  PANCREAS: Within normal limits.  ADRENALS: Within normal limits.  KIDNEYS/URETERS: A subcentimeter left renal hypodensity that is too small   to characterize, stable.    BLADDER: Within normal limits.  REPRODUCTIVE ORGANS: Normal sized prostate with brachytherapy seeds.    BOWEL: A small hiatal hernia. Evidence of prior gastric sleeve. Status   post Mohinder-en-Y. No bowel obstruction. Appendix is not visualized. No   evidence of inflammation in the pericecal region.  PERITONEUM: No ascites.  VESSELS: Atherosclerotic changes.  RETROPERITONEUM/LYMPH NODES: No lymphadenopathy.  ABDOMINAL WALL: Within normal limits.  BONES: Degenerative changes.    IMPRESSION:  No CT evidence of acute intra-abdominal pathology.    --- End of Report ---            NBA KHAN MD; Attending Radiologist  This document has been electronically signed. Apr 16 2024  7:01PM

## 2024-04-16 NOTE — ED PROVIDER NOTE - NSFOLLOWUPINSTRUCTIONS_ED_ALL_ED_FT
Detail Level: Detailed Detail Level: Zone FOLLOW UP WITH YOUR GASTROENTEROLOGIST  RETURN TO ER IMMEDIATELY IF ANY WORSENING PAIN, BLACK OR BLOODY STOOL, PALPITATIONS, SHORTNESS OF BREATH OR ANY OTHER CONCERNS

## 2024-04-16 NOTE — ED PROVIDER NOTE - PROGRESS NOTE DETAILS
Rhett Dominguez MD PGY-2: pt reassessed. Pain was slightly better with morphine but is back now. Will give ofirmev. Abd soft, mildly tender in hypogastric and RLQ area, no guarding. Labs nonactionable. Awaiting CT scan. Oral contrast was already given. Rhett Dominguez MD PGY-2: paged surgery since bariatric patient. Bariatric Surgeon Dr Jorge Lafleur ([facility]) done in [facility] ~2015. Rhett Dominguez MD PGY-2: pt asking to eat, awaiting ct results. told him to wait SUSANNE Denis: received pt in sign out,  Briefly, pt is a 58 YO M with PMH Asthma, HTN, A-fib (on Xarelto), GERD,, ASHUTOSH, Gastric sleeve and Mohinder-en-Y about 25 years ago, Laparoscopic reduction of internal hernia and closure of defect in March 2023 who presented to ED with acute on chronic abdominal pain and bloody stool. CT unrevealing.  Discussed with general surgery. SUSANNE Denis: received pt in sign out,  Briefly, pt is a 58 YO M with PMH Asthma, HTN, GERD,, ASHUTOSH, Gastric sleeve and Mohinder-en-Y about 25 years ago, Laparoscopic reduction of internal hernia and closure of defect in March 2023 who presented to ED with acute on chronic abdominal pain and bloody stool. CT unrevealing.  Discussed with general surgery, recommending GI consult  Discussed with patient inpatient stay for further work up of blood stool. Pt reports he much rather prefer to follow up with his own GI dr and not stay in the hospital. Confirmed with patient he reports he is NOT on any AC at this time. Pt H/H stable, abdominal pain has improved, tolerating PO at bedside. Shared decision making with patient, will follow up as outpatient. Strict return precautions discussed.

## 2024-04-16 NOTE — ED PROVIDER NOTE - CLINICAL SUMMARY MEDICAL DECISION MAKING FREE TEXT BOX
59-year-old male with PMH gastric sleeve, Mohinder-en-Y over 20 years ago, emergent hernia surgery last year presents with acute on chronic abdominal pain and blood in his stool.  Exam shows right lower quadrant and hypogastric tenderness to palpation of the abdomen.  Will obtain labs, CT abdomen pelvis with oral and IV contrast given history of bowel surgery and tenderness and analgesia and IV fluids.  Dispo pending results. 59-year-old male with PMH gastric sleeve, Mohinder-en-Y over 20 years ago, emergent hernia surgery last year presents with acute on chronic abdominal pain and blood in his stool.  Exam shows right lower quadrant and hypogastric tenderness to palpation of the abdomen.  Will obtain labs, CT abdomen pelvis with oral and IV contrast given history of bowel surgery and tenderness and analgesia and IV fluids.  Dispo pending results.    Attending MD Petty : Patient here with prior bariatric surgery with acute on chronic abdominal pain and BRBPR now with acute lethargy and weakness, concern with tolerating PO. Concern for intrabdominal pathology, obstruction. BRBPR suggestive of LGIB, defer CTA given lack of hemodynamic instability or even tachycardia. Will obtain CTAP with PO and IV contrast to assess, CBC to assess Hgb. May require surgical consult, admission for scope depending on results.

## 2024-05-03 ENCOUNTER — APPOINTMENT (OUTPATIENT)
Dept: PULMONOLOGY | Facility: CLINIC | Age: 59
End: 2024-05-03
Payer: COMMERCIAL

## 2024-05-03 VITALS
RESPIRATION RATE: 16 BRPM | HEIGHT: 63 IN | SYSTOLIC BLOOD PRESSURE: 132 MMHG | BODY MASS INDEX: 29.95 KG/M2 | HEART RATE: 93 BPM | WEIGHT: 169 LBS | OXYGEN SATURATION: 98 % | DIASTOLIC BLOOD PRESSURE: 68 MMHG | TEMPERATURE: 97.3 F

## 2024-05-03 PROCEDURE — 96372 THER/PROPH/DIAG INJ SC/IM: CPT

## 2024-05-03 RX ORDER — TEZEPELUMAB-EKKO 210 MG/1.9ML
210 INJECTION, SOLUTION SUBCUTANEOUS
Qty: 0 | Refills: 0 | Status: COMPLETED | OUTPATIENT
Start: 2024-05-03

## 2024-05-31 ENCOUNTER — APPOINTMENT (OUTPATIENT)
Dept: PULMONOLOGY | Facility: CLINIC | Age: 59
End: 2024-05-31
Payer: COMMERCIAL

## 2024-05-31 VITALS
BODY MASS INDEX: 30.12 KG/M2 | WEIGHT: 170 LBS | SYSTOLIC BLOOD PRESSURE: 122 MMHG | TEMPERATURE: 97.6 F | DIASTOLIC BLOOD PRESSURE: 74 MMHG | RESPIRATION RATE: 16 BRPM | HEART RATE: 61 BPM | OXYGEN SATURATION: 98 % | HEIGHT: 63 IN

## 2024-05-31 PROCEDURE — 96372 THER/PROPH/DIAG INJ SC/IM: CPT

## 2024-05-31 RX ORDER — TEZEPELUMAB-EKKO 210 MG/1.9ML
210 INJECTION, SOLUTION SUBCUTANEOUS
Qty: 0 | Refills: 0 | Status: COMPLETED | OUTPATIENT
Start: 2024-05-31

## 2024-06-05 ENCOUNTER — APPOINTMENT (OUTPATIENT)
Dept: PULMONOLOGY | Facility: CLINIC | Age: 59
End: 2024-06-05
Payer: COMMERCIAL

## 2024-06-05 VITALS
HEART RATE: 87 BPM | SYSTOLIC BLOOD PRESSURE: 122 MMHG | RESPIRATION RATE: 18 BRPM | BODY MASS INDEX: 31.01 KG/M2 | WEIGHT: 175 LBS | OXYGEN SATURATION: 98 % | TEMPERATURE: 97.2 F | DIASTOLIC BLOOD PRESSURE: 70 MMHG | HEIGHT: 63 IN

## 2024-06-05 DIAGNOSIS — J30.9 ALLERGIC RHINITIS, UNSPECIFIED: ICD-10-CM

## 2024-06-05 DIAGNOSIS — J45.50 SEVERE PERSISTENT ASTHMA, UNCOMPLICATED: ICD-10-CM

## 2024-06-05 PROCEDURE — 94618 PULMONARY STRESS TESTING: CPT

## 2024-06-05 PROCEDURE — 99214 OFFICE O/P EST MOD 30 MIN: CPT | Mod: 25

## 2024-06-05 NOTE — ASSESSMENT
[FreeTextEntry1] :  Mr. 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 Samples given Continue Albuterol via nebulizer, Q6H -Change  Breztri 2 puffs BID  to Trelegy 200 1 inhalation daily (minimize confusion with rescue inhaler/ pt has not been able to get spacer). Samples given -Continue Singulair 10 mg QHS -Discussed importance of adherence to maintenance inhalers to optimize lung function for travel. No evidence of desat/ supplemental O2 needs on this visit or prior 6MWTs  -Inhaler technique reviewed as well as oral hygiene techniques reviewed with patient. Avoidance of cold air, extremes of temperature, rescue inhaler should be used before exercise. Order of medication reviewed with patient. Recommended use of a cool mist humidifier in the bedroom. - Asthma is believed to be caused by inherited (genetic) and environmental factor, but its exact cause is unknown. - Asthma may be triggered by allergens, lung infections, or irritants in the air. Asthma triggers are different for each person  problem 1A: Biologic Evaluation -s/p Blood work to include: asthma panel, food IgE panel, IgE level, eosinophil level, vitamin D level -Continue  Tezspire (started 3/2024) -Type 2 asthma, which accounts for approximately 70% of all cases, is indicated by high eosinophil counts and elevated levels of T2 cytokines. Six biologic agents are FDA approved to treat moderate to severe asthma by targeting various cytokines and cell surface receptors involved in the inflammatory process in asthma. Several biologic therapies are also indicated for other inflammatory conditions marked by high eosinophils. Efficacy of biologic therapy should be assessed after 4 to 6 months of treatment.  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 Tracheomalacia can be asymptomatic, however signs or symptoms can develop as the severity of the airway narrowing progresses with major symptoms include dyspnea, cough, and sputum retention. Other symptoms include severe paroxysms of coughing, wheezing or stridor, barking cough and may be exacerbated by forced expiration, cough, and valsalva maneuver. Tracheomalacia is diagnosed by a bronchoscopic visualization of dynamic airway collapse on dynamic chest CT. Therapy is warranted in symptomatic patients with severe tracheomalacia and includes surgical repair as tracheobronchoplasty. The patient was referred to Nick/Laureen  Problem 3: Allergies / Sinus -continue Dymista 1 sniff BID Environmental measures for allergies were encouraged including mattress and pillow covers, air purifier, and environmental controls.  Problem 4: GERD (s/p Agnieszka-en-Y procedure) -Rule of 2s: avoid eating too much, eating too late, eating too spicy, eating two hours before bed. -Things to avoid including overeating, spicy foods, tight clothing, eating within three hours of bed, this list is not all inclusive. -For treatment of reflux, possible options discussed including diet control, H2 blockers, PPIs, as well as coating motility agents discussed as treatment options. Timing of meals and proximity of last meal to sleep were discussed. If symptoms persist, a formal gastrointestinal evaluation is needed.  problem 5: cardiac -echo ordered for eval dizziness -f/u with cardiology (dr draper)  f/u as scheduled He is encouraged to call with any changes, concerns, or questions.

## 2024-06-05 NOTE — HISTORY OF PRESENT ILLNESS
[Never] : never [TextBox_4] : Mr. 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

## 2024-06-14 ENCOUNTER — APPOINTMENT (OUTPATIENT)
Dept: CARDIOLOGY | Facility: CLINIC | Age: 59
End: 2024-06-14
Payer: COMMERCIAL

## 2024-06-14 DIAGNOSIS — R06.02 SHORTNESS OF BREATH: ICD-10-CM

## 2024-06-14 DIAGNOSIS — I10 ESSENTIAL (PRIMARY) HYPERTENSION: ICD-10-CM

## 2024-06-14 DIAGNOSIS — R94.39 ABNORMAL RESULT OF OTHER CARDIOVASCULAR FUNCTION STUDY: ICD-10-CM

## 2024-06-14 PROCEDURE — 93306 TTE W/DOPPLER COMPLETE: CPT

## 2024-06-15 PROBLEM — R94.39 ABNORMAL STRESS TEST: Status: ACTIVE | Noted: 2023-09-01

## 2024-06-15 PROBLEM — R06.02 SOB (SHORTNESS OF BREATH): Status: ACTIVE | Noted: 2024-04-04

## 2024-06-15 PROBLEM — I10 HYPERTENSION, UNSPECIFIED TYPE: Status: ACTIVE | Noted: 2023-09-01

## 2024-06-17 ENCOUNTER — TRANSCRIPTION ENCOUNTER (OUTPATIENT)
Age: 59
End: 2024-06-17

## 2024-06-19 ENCOUNTER — LABORATORY RESULT (OUTPATIENT)
Age: 59
End: 2024-06-19

## 2024-06-19 ENCOUNTER — NON-APPOINTMENT (OUTPATIENT)
Age: 59
End: 2024-06-19

## 2024-06-19 ENCOUNTER — APPOINTMENT (OUTPATIENT)
Dept: CARDIOLOGY | Facility: CLINIC | Age: 59
End: 2024-06-19
Payer: COMMERCIAL

## 2024-06-19 VITALS
HEIGHT: 63 IN | RESPIRATION RATE: 18 BRPM | SYSTOLIC BLOOD PRESSURE: 127 MMHG | HEART RATE: 65 BPM | OXYGEN SATURATION: 98 % | TEMPERATURE: 97.9 F | WEIGHT: 170 LBS | BODY MASS INDEX: 30.12 KG/M2 | DIASTOLIC BLOOD PRESSURE: 82 MMHG

## 2024-06-19 DIAGNOSIS — I25.10 ATHEROSCLEROTIC HEART DISEASE OF NATIVE CORONARY ARTERY W/OUT ANGINA PECTORIS: ICD-10-CM

## 2024-06-19 DIAGNOSIS — Z78.9 OTHER SPECIFIED HEALTH STATUS: ICD-10-CM

## 2024-06-19 DIAGNOSIS — Z80.6 FAMILY HISTORY OF LEUKEMIA: ICD-10-CM

## 2024-06-19 DIAGNOSIS — E78.5 HYPERLIPIDEMIA, UNSPECIFIED: ICD-10-CM

## 2024-06-19 DIAGNOSIS — Z82.49 FAMILY HISTORY OF ISCHEMIC HEART DISEASE AND OTHER DISEASES OF THE CIRCULATORY SYSTEM: ICD-10-CM

## 2024-06-19 DIAGNOSIS — R01.1 CARDIAC MURMUR, UNSPECIFIED: ICD-10-CM

## 2024-06-19 DIAGNOSIS — Z82.3 FAMILY HISTORY OF STROKE: ICD-10-CM

## 2024-06-19 DIAGNOSIS — R73.03 PREDIABETES.: ICD-10-CM

## 2024-06-19 PROCEDURE — 99203 OFFICE O/P NEW LOW 30 MIN: CPT

## 2024-06-19 PROCEDURE — G2211 COMPLEX E/M VISIT ADD ON: CPT

## 2024-06-19 PROCEDURE — 93000 ELECTROCARDIOGRAM COMPLETE: CPT

## 2024-06-19 RX ORDER — ROSUVASTATIN CALCIUM 20 MG/1
20 TABLET, FILM COATED ORAL
Qty: 90 | Refills: 1 | Status: ACTIVE | COMMUNITY
Start: 2024-06-19 | End: 1900-01-01

## 2024-06-19 NOTE — PHYSICAL EXAM
[Well Developed] : well developed [Well Nourished] : well nourished [No Acute Distress] : no acute distress [Normal Conjunctiva] : normal conjunctiva [Normal Venous Pressure] : normal venous pressure [No Carotid Bruit] : no carotid bruit [Normal S1, S2] : normal S1, S2 [No Rub] : no rub [No Gallop] : no gallop [Normal Rate] : normal [Normal S1] : normal S1 [Normal S2] : normal S2 [I] : a grade 1 [No Pitting Edema] : no pitting edema present [2+] : left 2+ [No Abnormalities] : the abdominal aorta was not enlarged and no bruit was heard [Clear Lung Fields] : clear lung fields [Good Air Entry] : good air entry [No Respiratory Distress] : no respiratory distress  [Soft] : abdomen soft [Non Tender] : non-tender [No Masses/organomegaly] : no masses/organomegaly [Normal Bowel Sounds] : normal bowel sounds [Normal Gait] : normal gait [No Edema] : no edema [No Cyanosis] : no cyanosis [No Clubbing] : no clubbing [No Varicosities] : no varicosities [No Rash] : no rash [No Skin Lesions] : no skin lesions [Moves all extremities] : moves all extremities [No Focal Deficits] : no focal deficits [Normal Speech] : normal speech [Alert and Oriented] : alert and oriented [Normal memory] : normal memory [S3] : no S3 [S4] : no S4 [Right Carotid Bruit] : no bruit heard over the right carotid [Left Carotid Bruit] : no bruit heard over the left carotid [Right Femoral Bruit] : no bruit heard over the right femoral artery [Left Femoral Bruit] : no bruit heard over the left femoral artery

## 2024-06-19 NOTE — DISCUSSION/SUMMARY
[FreeTextEntry1] : This is a 59-year-old male with past medical history significant for mild coronary artery disease, asthma, hyperlipidemia, who comes in for cardiology second opinion regarding hypertension. He denies chest pain, shortness of breath, dizziness or syncope.  He was born in [place] and has no history of rheumatic fever.  He does not drink excessive caffeine or alcohol. Cardiac risk factors include hyperlipidemia. Father  of stroke at age 74 with no other health complications, Mother  of stroke at 64 with comorbid diabetes, and hypertension. Electrocardiogram done 2024 demonstrated normal sinus rhythm rate 65 bpm is otherwise unremarkable. The patient reports that he was seen by his primary cardiologist to noted he had hypertension and wanted to initiate therapy.  The patient reports that he never had hypertension and subsequent blood pressure readings have been within normal limits. Lipid panel done 2023 demonstrated cholesterol 193, HDL 51, LDL calculated 129, non-HDL cholesterol 142, triglycerides 66 mg/dL. Cardiac catheterization done 2022 demonstrated normal left main artery, minor luminal irregularities in the left anterior descending artery, minor luminal irregularities in the left circumflex artery, right coronary artery had no disease.  The procedure was complicated by an episode of ventricular tachycardia requiring electrical conversion. The patient will have new blood work done today for lipid panel, lipoprotein a, lipoprotein B, direct LDL cholesterol, SMA 20 and high-sensitivity C-reactive protein. Echo Doppler examination done 2024 demonstrated mild tricuspid valve regurgitation, mild mitral valve regurgitation, normal left ventricular ejection fraction of 65% with normal pulmonary artery systolic pressure.  The patient does not have hypertension on today's examination.  He assures me that his blood pressure has been normal throughout his lifetime. The patient does have early coronary artery disease and I recommend he start on Crestor 20 mg daily for primary prevention. Lifestyle modification was reinforced. The patient understands that aerobic exercises must be increased to 40 minutes 4 times per week. A detailed discussion of lifestyle modification was done today. The patient has a good understanding of the diagnosis, and treatment plan. Lifestyle modification was also outlined.  The patient is instructed follow-up with his primary care physician and primary cardiologist. His LDL target is less than 70 mg/dL.  He will have follow-up blood work in 6 weeks.  Thank you for allowing to participate in the care of your patient.  Please do not hesitate to call if you have any questions.

## 2024-06-19 NOTE — REVIEW OF SYSTEMS
[SOB] : shortness of breath [Dyspnea on exertion] : dyspnea during exertion [Chest Discomfort] : chest discomfort [Lower Ext Edema] : lower extremity edema [Negative] : Heme/Lymph

## 2024-06-19 NOTE — REASON FOR VISIT
[CV Risk Factors and Non-Cardiac Disease] : CV risk factors and non-cardiac disease [FreeTextEntry3] : Dr Villa Coates [FreeTextEntry1] : This is a 59 year old male born in [place] with history of mild coronary artery disease, asthma (under control) and family history of stroke for both his mother and father presents for a second opinion regarding possible "hypertension". The patient does get occasional left ankle swelling, dyspnea on exertion.  He denies chest pain, syncopal episodes, or dizziness. Cardiac risk factors are negative for smoking, Father  of stroke at age 74 with no other health complications, Mother  of stroke at 64 with comorbid diabetes, and hypertension.  EKG shows a NSR with rate of 65 beats per minute. Bloodwork from 2023 cholesterol total of 193, with . Bloodwork was taken today, 2024 for lipoprotein A, sensitive CRP and direct LDL cholesterol. Patient had an echocardiogram 2024 that showed normal ejection fraction, mild mitral regurgitation, mild tricuspid regurgitation and an estimated pulmonary artery systolic pressure of 30 mmHg. 2022 patient had a cardiac catheterization  that found LAD angiography showed minor luminal irregularities with no flow limiting lesions. CX angiography showed minor luminal irregularities with no flow limiting lesions. LM and RCA both normal. Vtach occurred during Cath lab visit which required cardioversion. Patient will begin on 20g rosuvastatin and repeat blood work for expected change in LDL.

## 2024-06-28 ENCOUNTER — APPOINTMENT (OUTPATIENT)
Dept: PULMONOLOGY | Facility: CLINIC | Age: 59
End: 2024-06-28

## 2024-07-09 ENCOUNTER — APPOINTMENT (OUTPATIENT)
Dept: PULMONOLOGY | Facility: CLINIC | Age: 59
End: 2024-07-09
Payer: COMMERCIAL

## 2024-07-09 VITALS
HEIGHT: 63 IN | WEIGHT: 165 LBS | HEART RATE: 70 BPM | DIASTOLIC BLOOD PRESSURE: 70 MMHG | RESPIRATION RATE: 18 BRPM | OXYGEN SATURATION: 97 % | BODY MASS INDEX: 29.23 KG/M2 | TEMPERATURE: 97.3 F | SYSTOLIC BLOOD PRESSURE: 110 MMHG

## 2024-07-09 DIAGNOSIS — R06.02 SHORTNESS OF BREATH: ICD-10-CM

## 2024-07-09 DIAGNOSIS — J30.9 ALLERGIC RHINITIS, UNSPECIFIED: ICD-10-CM

## 2024-07-09 DIAGNOSIS — K21.9 GASTRO-ESOPHAGEAL REFLUX DISEASE W/OUT ESOPHAGITIS: ICD-10-CM

## 2024-07-09 DIAGNOSIS — J45.50 SEVERE PERSISTENT ASTHMA, UNCOMPLICATED: ICD-10-CM

## 2024-07-09 DIAGNOSIS — E66.9 OBESITY, UNSPECIFIED: ICD-10-CM

## 2024-07-09 DIAGNOSIS — R06.83 SNORING: ICD-10-CM

## 2024-07-09 DIAGNOSIS — J39.8 OTHER SPECIFIED DISEASES OF UPPER RESPIRATORY TRACT: ICD-10-CM

## 2024-07-09 PROCEDURE — 96372 THER/PROPH/DIAG INJ SC/IM: CPT

## 2024-07-09 PROCEDURE — ZZZZZ: CPT

## 2024-07-09 PROCEDURE — 99214 OFFICE O/P EST MOD 30 MIN: CPT | Mod: 25

## 2024-07-09 PROCEDURE — 95012 NITRIC OXIDE EXP GAS DETER: CPT

## 2024-07-09 RX ORDER — TEZEPELUMAB-EKKO 210 MG/1.9ML
210 INJECTION, SOLUTION SUBCUTANEOUS
Qty: 0 | Refills: 0 | Status: COMPLETED | OUTPATIENT
Start: 2024-07-09

## 2024-07-09 RX ORDER — PREDNISONE 10 MG/1
10 TABLET ORAL
Qty: 50 | Refills: 0 | Status: ACTIVE | COMMUNITY
Start: 2024-07-09 | End: 1900-01-01

## 2024-07-09 RX ADMIN — TEZEPELUMAB-EKKO 210 MG/1.91ML: 210 INJECTION, SOLUTION SUBCUTANEOUS at 00:00

## 2024-07-29 ENCOUNTER — APPOINTMENT (OUTPATIENT)
Dept: SLEEP CENTER | Facility: CLINIC | Age: 59
End: 2024-07-29

## 2024-08-30 ENCOUNTER — APPOINTMENT (OUTPATIENT)
Dept: CARDIOLOGY | Facility: CLINIC | Age: 59
End: 2024-08-30
Payer: COMMERCIAL

## 2024-08-30 VITALS
HEIGHT: 63 IN | OXYGEN SATURATION: 98 % | DIASTOLIC BLOOD PRESSURE: 68 MMHG | WEIGHT: 172 LBS | TEMPERATURE: 98.1 F | BODY MASS INDEX: 30.48 KG/M2 | RESPIRATION RATE: 18 BRPM | HEART RATE: 82 BPM | SYSTOLIC BLOOD PRESSURE: 110 MMHG

## 2024-08-30 DIAGNOSIS — E78.5 HYPERLIPIDEMIA, UNSPECIFIED: ICD-10-CM

## 2024-08-30 DIAGNOSIS — E11.9 TYPE 2 DIABETES MELLITUS W/OUT COMPLICATIONS: ICD-10-CM

## 2024-08-30 DIAGNOSIS — I10 ESSENTIAL (PRIMARY) HYPERTENSION: ICD-10-CM

## 2024-08-30 DIAGNOSIS — I34.0 NONRHEUMATIC MITRAL (VALVE) INSUFFICIENCY: ICD-10-CM

## 2024-08-30 DIAGNOSIS — Z86.79 PERSONAL HISTORY OF OTHER DISEASES OF THE CIRCULATORY SYSTEM: ICD-10-CM

## 2024-08-30 DIAGNOSIS — I25.10 ATHEROSCLEROTIC HEART DISEASE OF NATIVE CORONARY ARTERY W/OUT ANGINA PECTORIS: ICD-10-CM

## 2024-08-30 PROCEDURE — G2211 COMPLEX E/M VISIT ADD ON: CPT

## 2024-08-30 PROCEDURE — 99214 OFFICE O/P EST MOD 30 MIN: CPT

## 2024-08-30 NOTE — DISCUSSION/SUMMARY
[FreeTextEntry1] : Dr. Bryant-(PRIOR VISIT and PMH WITH Dr. Bryant): This is a 59-year-old male with past medical history significant for mild coronary artery disease, asthma, hyperlipidemia, who comes in for cardiology second opinion regarding hypertension. He denies chest pain, shortness of breath, dizziness or syncope.  He was born in [place] and has no history of rheumatic fever.  He does not drink excessive caffeine or alcohol. Cardiac risk factors include hyperlipidemia. Father  of stroke at age 74 with no other health complications, Mother  of stroke at 64 with comorbid diabetes, and hypertension. Electrocardiogram done 2024 demonstrated normal sinus rhythm rate 65 bpm is otherwise unremarkable. The patient reports that he was seen by his primary cardiologist to noted he had hypertension and wanted to initiate therapy.  The patient reports that he never had hypertension and subsequent blood pressure readings have been within normal limits. Lipid panel done 2023 demonstrated cholesterol 193, HDL 51, LDL calculated 129, non-HDL cholesterol 142, triglycerides 66 mg/dL. Cardiac catheterization done 2022 demonstrated normal left main artery, minor luminal irregularities in the left anterior descending artery, minor luminal irregularities in the left circumflex artery, right coronary artery had no disease.  The procedure was complicated by an episode of ventricular tachycardia requiring electrical conversion. The patient will have new blood work done today for lipid panel, lipoprotein a, lipoprotein B, direct LDL cholesterol, SMA 20 and high-sensitivity C-reactive protein. Echo Doppler examination done 2024 demonstrated mild tricuspid valve regurgitation, mild mitral valve regurgitation, normal left ventricular ejection fraction of 65% with normal pulmonary artery systolic pressure.  The patient does not have hypertension on today's examination.  He assures me that his blood pressure has been normal throughout his lifetime. The patient does have early coronary artery disease and I recommend he start on Crestor 20 mg daily for primary prevention. Lifestyle modification was reinforced. The patient understands that aerobic exercises must be increased to 40 minutes 4 times per week. A detailed discussion of lifestyle modification was done today. The patient has a good understanding of the diagnosis, and treatment plan. Lifestyle modification was also outlined.  The patient is instructed follow-up with his primary care physician and primary cardiologist. His LDL target is less than 70 mg/dL.  He will have follow-up blood work in 6 weeks.  Thank you for allowing to participate in the care of your patient.  Please do not hesitate to call if you have any questions.

## 2024-08-30 NOTE — ASSESSMENT
[FreeTextEntry1] : This is a 59-year-old male with past medical history significant for mild coronary artery disease, asthma, hyperlipidemia, who comes in for cardiology follow-up evaluation.  He was born in [place] and has no history of rheumatic fever.  He does not drink excessive caffeine or alcohol.  Father  of stroke at age 74 with no other health complications, Mother  of stroke at 64 with comorbid diabetes, and hypertension.  Cardiac risk factors include hyperlipidemia.  HPI: He is feeling generally well today and denies chest pain, dizziness, heart palpitations, recent episodes of syncope or falls, SOB, or dyspnea at this time.  Current Medications: Rosuvastatin 20 mg daily *Reports stopping this for a while to see how his cholesterol numbers were without any medication*   New blood work prescription given to patient on 2024 to evaluate lipid profile, CBC, BMP, hepatic function, A1C and TSH in 2 weeks. Explained that his target LDL is < 70 mg/dL and if not at goal, would recommend restarting his Rosuvastatin 20 mg daily with the possibility of adding Zetia 10 mg daily for further reduction if needed.     BLOOD PRESSURE: Controlled.   BLOOD WORK:  *LDL target goal < 70* -New blood work prescription given to patient on 2024 to evaluate lipid profile, CBC, BMP, hepatic function, A1C and TSH in 2 weeks.  -Blood work done 2024 demonstrated triglycerides 70, cholesterol 220, HDL 57, , non-, LDL direct 155, hemoglobin A1c 6.7%.  -Lipid panel done 2023 demonstrated cholesterol 193, HDL 51, LDL calculated 129, non-HDL cholesterol 142, triglycerides 66 mg/dL.   TESTING/REPORTS: -Electrocardiogram done 2024 demonstrated normal sinus rhythm rate 65 bpm is otherwise unremarkable.  -Echocardiogram done 2024 demonstrated normal left ventricular systolic function EF 65%, mild mitral regurgitation, mild tricuspid regurgitation.  -Cardiac catheterization done 2022 demonstrated mild irregularity of the coronary artery anatomy.    PLAN: -New blood work prescription given to patient on 2024 to evaluate lipid profile, CBC, BMP, hepatic function, A1C and TSH in 2 weeks.  -He will continue his current medications and contact the office if problems arise before next follow-up appointment.  I have discussed the plan of care with PILO PALENCIA and he will follow up in 3 months. They are compliant with all of their medications.     The patient understands that aerobic exercises must be increased to 40 minutes 4 times/week and a detailed discussion of lifestyle modification was done today.   The patient has a good understanding of the diagnosis, treatment plan and lifestyle modification.   They will contact me at the office for any questions with their care or any changes in their health status.   The patient was discussed with supervision physician Dr. Eric Bryant at the time of the visit and the plan of care will be carried out as noted above.     TOD Hsu NP

## 2024-09-01 ENCOUNTER — NON-APPOINTMENT (OUTPATIENT)
Age: 59
End: 2024-09-01

## 2024-09-05 ENCOUNTER — OUTPATIENT (OUTPATIENT)
Dept: OUTPATIENT SERVICES | Facility: HOSPITAL | Age: 59
LOS: 1 days | End: 2024-09-05
Payer: COMMERCIAL

## 2024-09-05 ENCOUNTER — APPOINTMENT (OUTPATIENT)
Dept: MRI IMAGING | Facility: IMAGING CENTER | Age: 59
End: 2024-09-05

## 2024-09-05 DIAGNOSIS — Z98.890 OTHER SPECIFIED POSTPROCEDURAL STATES: Chronic | ICD-10-CM

## 2024-09-05 DIAGNOSIS — Z98.89 OTHER SPECIFIED POSTPROCEDURAL STATES: Chronic | ICD-10-CM

## 2024-09-05 DIAGNOSIS — Z98.84 BARIATRIC SURGERY STATUS: Chronic | ICD-10-CM

## 2024-09-05 DIAGNOSIS — D64.9 ANEMIA, UNSPECIFIED: ICD-10-CM

## 2024-09-05 DIAGNOSIS — Z90.89 ACQUIRED ABSENCE OF OTHER ORGANS: Chronic | ICD-10-CM

## 2024-09-05 PROCEDURE — 72197 MRI PELVIS W/O & W/DYE: CPT | Mod: 26

## 2024-09-05 PROCEDURE — 76498 UNLISTED MR PROCEDURE: CPT

## 2024-09-05 PROCEDURE — A9585: CPT

## 2024-09-05 PROCEDURE — 76498P: CUSTOM | Mod: 26

## 2024-09-05 PROCEDURE — 72197 MRI PELVIS W/O & W/DYE: CPT

## 2024-10-21 ENCOUNTER — APPOINTMENT (OUTPATIENT)
Dept: VASCULAR SURGERY | Facility: CLINIC | Age: 59
End: 2024-10-21
Payer: COMMERCIAL

## 2024-10-21 VITALS
SYSTOLIC BLOOD PRESSURE: 108 MMHG | BODY MASS INDEX: 29.23 KG/M2 | HEIGHT: 63 IN | WEIGHT: 165 LBS | TEMPERATURE: 98 F | DIASTOLIC BLOOD PRESSURE: 71 MMHG | HEART RATE: 60 BPM

## 2024-10-21 DIAGNOSIS — I83.893 VARICOSE VEINS OF BILATERAL LOWER EXTREMITIES WITH OTHER COMPLICATIONS: ICD-10-CM

## 2024-10-21 PROCEDURE — 99204 OFFICE O/P NEW MOD 45 MIN: CPT

## 2024-10-21 PROCEDURE — 93970 EXTREMITY STUDY: CPT

## 2024-10-21 PROCEDURE — 93923 UPR/LXTR ART STDY 3+ LVLS: CPT

## 2024-11-18 ENCOUNTER — APPOINTMENT (OUTPATIENT)
Dept: PULMONOLOGY | Facility: CLINIC | Age: 59
End: 2024-11-18

## 2024-11-21 NOTE — ASU DISCHARGE PLAN (ADULT/PEDIATRIC) - A. DRIVE A CAR, OPERATE POWER TOOLS OR MACHINERY
11/21/2024  Lynette Perkins    Goals discussed during today's visit:    1. Aim for 15-30 grams of Carbohydrates/meal and <15 grams of Carbohydrates/snack  2. Increase physical activity to 30 minutes most days of the week  3. Focus on protein intake of at least 90 grams a day    Follow up with Dietitian:    Please contact me if you have any questions or concerns.  Anne Ramírez, JOAQUIN, CD, [facility] [place]  [phone], extension [phone]    Statement Selected

## 2024-12-09 ENCOUNTER — RX RENEWAL (OUTPATIENT)
Age: 59
End: 2024-12-09

## 2024-12-26 ENCOUNTER — EMERGENCY (EMERGENCY)
Facility: HOSPITAL | Age: 59
LOS: 1 days | Discharge: HOME | End: 2024-12-26
Attending: STUDENT IN AN ORGANIZED HEALTH CARE EDUCATION/TRAINING PROGRAM
Payer: COMMERCIAL

## 2024-12-26 VITALS
DIASTOLIC BLOOD PRESSURE: 68 MMHG | TEMPERATURE: 99 F | HEART RATE: 88 BPM | OXYGEN SATURATION: 100 % | SYSTOLIC BLOOD PRESSURE: 107 MMHG | RESPIRATION RATE: 16 BRPM

## 2024-12-26 VITALS
WEIGHT: 179.9 LBS | SYSTOLIC BLOOD PRESSURE: 104 MMHG | DIASTOLIC BLOOD PRESSURE: 68 MMHG | HEART RATE: 68 BPM | HEIGHT: 67 IN | OXYGEN SATURATION: 98 % | TEMPERATURE: 99 F

## 2024-12-26 DIAGNOSIS — Z90.89 ACQUIRED ABSENCE OF OTHER ORGANS: Chronic | ICD-10-CM

## 2024-12-26 DIAGNOSIS — Z98.84 BARIATRIC SURGERY STATUS: Chronic | ICD-10-CM

## 2024-12-26 DIAGNOSIS — Z98.89 OTHER SPECIFIED POSTPROCEDURAL STATES: Chronic | ICD-10-CM

## 2024-12-26 DIAGNOSIS — Z98.890 OTHER SPECIFIED POSTPROCEDURAL STATES: Chronic | ICD-10-CM

## 2024-12-26 DIAGNOSIS — Z90.49 ACQUIRED ABSENCE OF OTHER SPECIFIED PARTS OF DIGESTIVE TRACT: Chronic | ICD-10-CM

## 2024-12-26 LAB
ALBUMIN SERPL ELPH-MCNC: 3.5 G/DL — SIGNIFICANT CHANGE UP (ref 3.3–5)
ALP SERPL-CCNC: 81 U/L — SIGNIFICANT CHANGE UP (ref 40–120)
ALT FLD-CCNC: 15 U/L — SIGNIFICANT CHANGE UP (ref 10–45)
ANION GAP SERPL CALC-SCNC: 9 MMOL/L — SIGNIFICANT CHANGE UP (ref 5–17)
AST SERPL-CCNC: 20 U/L — SIGNIFICANT CHANGE UP (ref 10–40)
BASOPHILS # BLD AUTO: 0.02 K/UL — SIGNIFICANT CHANGE UP (ref 0–0.2)
BASOPHILS NFR BLD AUTO: 0.4 % — SIGNIFICANT CHANGE UP (ref 0–2)
BILIRUB SERPL-MCNC: 0.2 MG/DL — SIGNIFICANT CHANGE UP (ref 0.2–1.2)
BUN SERPL-MCNC: 16 MG/DL — SIGNIFICANT CHANGE UP (ref 7–23)
CALCIUM SERPL-MCNC: 8.2 MG/DL — LOW (ref 8.4–10.5)
CHLORIDE SERPL-SCNC: 105 MMOL/L — SIGNIFICANT CHANGE UP (ref 96–108)
CO2 SERPL-SCNC: 26 MMOL/L — SIGNIFICANT CHANGE UP (ref 22–31)
CREAT SERPL-MCNC: 0.65 MG/DL — SIGNIFICANT CHANGE UP (ref 0.5–1.3)
EGFR: 107 ML/MIN/1.73M2 — SIGNIFICANT CHANGE UP
EGFR: 107 ML/MIN/1.73M2 — SIGNIFICANT CHANGE UP
EOSINOPHIL # BLD AUTO: 0.04 K/UL — SIGNIFICANT CHANGE UP (ref 0–0.5)
EOSINOPHIL NFR BLD AUTO: 0.9 % — SIGNIFICANT CHANGE UP (ref 0–6)
FLUAV AG NPH QL: SIGNIFICANT CHANGE UP
FLUBV AG NPH QL: SIGNIFICANT CHANGE UP
GLUCOSE SERPL-MCNC: 101 MG/DL — HIGH (ref 70–99)
HCT VFR BLD CALC: 40.9 % — SIGNIFICANT CHANGE UP (ref 39–50)
HGB BLD-MCNC: 12.4 G/DL — LOW (ref 13–17)
IMM GRANULOCYTES NFR BLD AUTO: 0.4 % — SIGNIFICANT CHANGE UP (ref 0–0.9)
LYMPHOCYTES # BLD AUTO: 1.49 K/UL — SIGNIFICANT CHANGE UP (ref 1–3.3)
LYMPHOCYTES # BLD AUTO: 32.3 % — SIGNIFICANT CHANGE UP (ref 13–44)
MAGNESIUM SERPL-MCNC: 2 MG/DL — SIGNIFICANT CHANGE UP (ref 1.6–2.6)
MCHC RBC-ENTMCNC: 23.4 PG — LOW (ref 27–34)
MCHC RBC-ENTMCNC: 30.3 G/DL — LOW (ref 32–36)
MCV RBC AUTO: 77.3 FL — LOW (ref 80–100)
MONOCYTES # BLD AUTO: 0.38 K/UL — SIGNIFICANT CHANGE UP (ref 0–0.9)
MONOCYTES NFR BLD AUTO: 8.2 % — SIGNIFICANT CHANGE UP (ref 2–14)
NEUTROPHILS # BLD AUTO: 2.67 K/UL — SIGNIFICANT CHANGE UP (ref 1.8–7.4)
NEUTROPHILS NFR BLD AUTO: 57.8 % — SIGNIFICANT CHANGE UP (ref 43–77)
NRBC # BLD: 0 /100 WBCS — SIGNIFICANT CHANGE UP (ref 0–0)
NRBC BLD-RTO: 0 /100 WBCS — SIGNIFICANT CHANGE UP (ref 0–0)
NT-PROBNP SERPL-SCNC: 138 PG/ML — SIGNIFICANT CHANGE UP (ref 0–300)
PLATELET # BLD AUTO: 203 K/UL — SIGNIFICANT CHANGE UP (ref 150–400)
POTASSIUM SERPL-MCNC: 4.3 MMOL/L — SIGNIFICANT CHANGE UP (ref 3.5–5.3)
POTASSIUM SERPL-SCNC: 4.3 MMOL/L — SIGNIFICANT CHANGE UP (ref 3.5–5.3)
PROT SERPL-MCNC: 6.9 G/DL — SIGNIFICANT CHANGE UP (ref 6–8.3)
RBC # BLD: 5.29 M/UL — SIGNIFICANT CHANGE UP (ref 4.2–5.8)
RBC # FLD: 14.6 % — HIGH (ref 10.3–14.5)
RSV RNA NPH QL NAA+NON-PROBE: SIGNIFICANT CHANGE UP
SARS-COV-2 RNA SPEC QL NAA+PROBE: SIGNIFICANT CHANGE UP
SODIUM SERPL-SCNC: 140 MMOL/L — SIGNIFICANT CHANGE UP (ref 135–145)
TROPONIN T, HIGH SENSITIVITY RESULT: <6 NG/L — SIGNIFICANT CHANGE UP (ref 0–51)
WBC # BLD: 4.62 K/UL — SIGNIFICANT CHANGE UP (ref 3.8–10.5)
WBC # FLD AUTO: 4.62 K/UL — SIGNIFICANT CHANGE UP (ref 3.8–10.5)

## 2024-12-26 PROCEDURE — 96374 THER/PROPH/DIAG INJ IV PUSH: CPT

## 2024-12-26 PROCEDURE — 71046 X-RAY EXAM CHEST 2 VIEWS: CPT | Mod: 26

## 2024-12-26 PROCEDURE — 83880 ASSAY OF NATRIURETIC PEPTIDE: CPT

## 2024-12-26 PROCEDURE — 80053 COMPREHEN METABOLIC PANEL: CPT

## 2024-12-26 PROCEDURE — 85025 COMPLETE CBC W/AUTO DIFF WBC: CPT

## 2024-12-26 PROCEDURE — 70450 CT HEAD/BRAIN W/O DYE: CPT | Mod: MC

## 2024-12-26 PROCEDURE — 84484 ASSAY OF TROPONIN QUANT: CPT

## 2024-12-26 PROCEDURE — 70450 CT HEAD/BRAIN W/O DYE: CPT | Mod: 26,MC

## 2024-12-26 PROCEDURE — 87637 SARSCOV2&INF A&B&RSV AMP PRB: CPT

## 2024-12-26 PROCEDURE — 99284 EMERGENCY DEPT VISIT MOD MDM: CPT

## 2024-12-26 PROCEDURE — 71046 X-RAY EXAM CHEST 2 VIEWS: CPT

## 2024-12-26 PROCEDURE — 96375 TX/PRO/DX INJ NEW DRUG ADDON: CPT

## 2024-12-26 PROCEDURE — 93005 ELECTROCARDIOGRAM TRACING: CPT

## 2024-12-26 PROCEDURE — 99285 EMERGENCY DEPT VISIT HI MDM: CPT | Mod: 25

## 2024-12-26 PROCEDURE — 83735 ASSAY OF MAGNESIUM: CPT

## 2024-12-26 RX ORDER — ACETAMINOPHEN 500 MG/5ML
1000 LIQUID (ML) ORAL ONCE
Refills: 0 | Status: COMPLETED | OUTPATIENT
Start: 2024-12-26 | End: 2024-12-26

## 2024-12-26 RX ORDER — METOCLOPRAMIDE HCL 10 MG
10 TABLET ORAL ONCE
Refills: 0 | Status: COMPLETED | OUTPATIENT
Start: 2024-12-26 | End: 2024-12-26

## 2024-12-26 RX ADMIN — Medication 500 MILLILITER(S): at 14:22

## 2024-12-26 RX ADMIN — Medication 10 MILLIGRAM(S): at 14:23

## 2024-12-26 RX ADMIN — Medication 400 MILLIGRAM(S): at 14:25

## 2025-01-03 ENCOUNTER — RESULT REVIEW (OUTPATIENT)
Age: 60
End: 2025-01-03

## 2025-01-03 ENCOUNTER — INPATIENT (INPATIENT)
Facility: HOSPITAL | Age: 60
LOS: 3 days | Discharge: HOME | End: 2025-01-07
Attending: INTERNAL MEDICINE | Admitting: INTERNAL MEDICINE
Payer: COMMERCIAL

## 2025-01-03 VITALS
OXYGEN SATURATION: 99 % | SYSTOLIC BLOOD PRESSURE: 106 MMHG | HEART RATE: 110 BPM | DIASTOLIC BLOOD PRESSURE: 60 MMHG | TEMPERATURE: 99 F | WEIGHT: 177.03 LBS | RESPIRATION RATE: 18 BRPM

## 2025-01-03 DIAGNOSIS — R47.81 SLURRED SPEECH: ICD-10-CM

## 2025-01-03 DIAGNOSIS — Z90.49 ACQUIRED ABSENCE OF OTHER SPECIFIED PARTS OF DIGESTIVE TRACT: Chronic | ICD-10-CM

## 2025-01-03 DIAGNOSIS — Z90.89 ACQUIRED ABSENCE OF OTHER ORGANS: Chronic | ICD-10-CM

## 2025-01-03 DIAGNOSIS — Z98.89 OTHER SPECIFIED POSTPROCEDURAL STATES: Chronic | ICD-10-CM

## 2025-01-03 DIAGNOSIS — Z98.890 OTHER SPECIFIED POSTPROCEDURAL STATES: Chronic | ICD-10-CM

## 2025-01-03 DIAGNOSIS — I62.01 NONTRAUMATIC ACUTE SUBDURAL HEMORRHAGE: ICD-10-CM

## 2025-01-03 DIAGNOSIS — Z98.84 BARIATRIC SURGERY STATUS: Chronic | ICD-10-CM

## 2025-01-03 LAB
A1C WITH ESTIMATED AVERAGE GLUCOSE RESULT: 6.5 % — HIGH (ref 4–5.6)
ADD ON TEST-SPECIMEN IN LAB: SIGNIFICANT CHANGE UP
ALBUMIN SERPL ELPH-MCNC: 3.5 G/DL — SIGNIFICANT CHANGE UP (ref 3.3–5)
ALP SERPL-CCNC: 83 U/L — SIGNIFICANT CHANGE UP (ref 40–120)
ALT FLD-CCNC: 17 U/L — SIGNIFICANT CHANGE UP (ref 4–41)
AMMONIA BLD-MCNC: 17 UMOL/L — SIGNIFICANT CHANGE UP (ref 11–55)
ANION GAP SERPL CALC-SCNC: 11 MMOL/L — SIGNIFICANT CHANGE UP (ref 7–14)
APAP SERPL-MCNC: <10 UG/ML — LOW (ref 15–25)
APAP SERPL-MCNC: <10 UG/ML — LOW (ref 15–25)
APTT BLD: 39.6 SEC — HIGH (ref 24.5–35.6)
AST SERPL-CCNC: 22 U/L — SIGNIFICANT CHANGE UP (ref 4–40)
BASOPHILS # BLD AUTO: 0.04 K/UL — SIGNIFICANT CHANGE UP (ref 0–0.2)
BASOPHILS NFR BLD AUTO: 0.4 % — SIGNIFICANT CHANGE UP (ref 0–2)
BILIRUB SERPL-MCNC: 0.3 MG/DL — SIGNIFICANT CHANGE UP (ref 0.2–1.2)
BLOOD GAS VENOUS COMPREHENSIVE RESULT: SIGNIFICANT CHANGE UP
BUN SERPL-MCNC: 14 MG/DL — SIGNIFICANT CHANGE UP (ref 7–23)
CALCIUM SERPL-MCNC: 8.6 MG/DL — SIGNIFICANT CHANGE UP (ref 8.4–10.5)
CHLORIDE SERPL-SCNC: 105 MMOL/L — SIGNIFICANT CHANGE UP (ref 98–107)
CHOLEST SERPL-MCNC: 174 MG/DL — SIGNIFICANT CHANGE UP
CK SERPL-CCNC: 95 U/L — SIGNIFICANT CHANGE UP (ref 30–200)
CO2 SERPL-SCNC: 23 MMOL/L — SIGNIFICANT CHANGE UP (ref 22–31)
CREAT SERPL-MCNC: 0.59 MG/DL — SIGNIFICANT CHANGE UP (ref 0.5–1.3)
EGFR: 110 ML/MIN/1.73M2 — SIGNIFICANT CHANGE UP
EOSINOPHIL # BLD AUTO: 0 K/UL — SIGNIFICANT CHANGE UP (ref 0–0.5)
EOSINOPHIL NFR BLD AUTO: 0 % — SIGNIFICANT CHANGE UP (ref 0–6)
ESTIMATED AVERAGE GLUCOSE: 140 — SIGNIFICANT CHANGE UP
ETHANOL SERPL-MCNC: <10 MG/DL — SIGNIFICANT CHANGE UP
ETHANOL SERPL-MCNC: <10 MG/DL — SIGNIFICANT CHANGE UP
GLUCOSE BLDC GLUCOMTR-MCNC: 100 MG/DL — HIGH (ref 70–99)
GLUCOSE BLDC GLUCOMTR-MCNC: 119 MG/DL — HIGH (ref 70–99)
GLUCOSE BLDC GLUCOMTR-MCNC: 135 MG/DL — HIGH (ref 70–99)
GLUCOSE BLDC GLUCOMTR-MCNC: 93 MG/DL — SIGNIFICANT CHANGE UP (ref 70–99)
GLUCOSE SERPL-MCNC: 151 MG/DL — HIGH (ref 70–99)
HCT VFR BLD CALC: 42.2 % — SIGNIFICANT CHANGE UP (ref 39–50)
HDLC SERPL-MCNC: 49 MG/DL — SIGNIFICANT CHANGE UP
HGB BLD-MCNC: 13.3 G/DL — SIGNIFICANT CHANGE UP (ref 13–17)
IANC: 7.52 K/UL — HIGH (ref 1.8–7.4)
IMM GRANULOCYTES NFR BLD AUTO: 0.6 % — SIGNIFICANT CHANGE UP (ref 0–0.9)
INR BLD: 1.51 RATIO — HIGH (ref 0.85–1.16)
LACTATE SERPL-SCNC: 1.5 MMOL/L — SIGNIFICANT CHANGE UP (ref 0.5–2)
LIPID PNL WITH DIRECT LDL SERPL: 112 MG/DL — HIGH
LYMPHOCYTES # BLD AUTO: 0.93 K/UL — LOW (ref 1–3.3)
LYMPHOCYTES # BLD AUTO: 10.4 % — LOW (ref 13–44)
MAGNESIUM SERPL-MCNC: 2 MG/DL — SIGNIFICANT CHANGE UP (ref 1.6–2.6)
MCHC RBC-ENTMCNC: 23.5 PG — LOW (ref 27–34)
MCHC RBC-ENTMCNC: 31.5 G/DL — LOW (ref 32–36)
MCV RBC AUTO: 74.7 FL — LOW (ref 80–100)
MONOCYTES # BLD AUTO: 0.41 K/UL — SIGNIFICANT CHANGE UP (ref 0–0.9)
MONOCYTES NFR BLD AUTO: 4.6 % — SIGNIFICANT CHANGE UP (ref 2–14)
NEUTROPHILS # BLD AUTO: 7.52 K/UL — HIGH (ref 1.8–7.4)
NEUTROPHILS NFR BLD AUTO: 84 % — HIGH (ref 43–77)
NON HDL CHOLESTEROL: 125 MG/DL — SIGNIFICANT CHANGE UP
NRBC # BLD: 0 /100 WBCS — SIGNIFICANT CHANGE UP (ref 0–0)
NRBC # FLD: 0 K/UL — SIGNIFICANT CHANGE UP (ref 0–0)
PLATELET # BLD AUTO: 273 K/UL — SIGNIFICANT CHANGE UP (ref 150–400)
POTASSIUM SERPL-MCNC: 4.1 MMOL/L — SIGNIFICANT CHANGE UP (ref 3.5–5.3)
POTASSIUM SERPL-SCNC: 4.1 MMOL/L — SIGNIFICANT CHANGE UP (ref 3.5–5.3)
PROT SERPL-MCNC: 7 G/DL — SIGNIFICANT CHANGE UP (ref 6–8.3)
PROTHROM AB SERPL-ACNC: 17.5 SEC — HIGH (ref 9.9–13.4)
RBC # BLD: 5.65 M/UL — SIGNIFICANT CHANGE UP (ref 4.2–5.8)
RBC # FLD: 14.7 % — HIGH (ref 10.3–14.5)
SALICYLATES SERPL-MCNC: <0.3 MG/DL — LOW (ref 15–30)
SALICYLATES SERPL-MCNC: <0.3 MG/DL — LOW (ref 15–30)
SODIUM SERPL-SCNC: 139 MMOL/L — SIGNIFICANT CHANGE UP (ref 135–145)
T3 SERPL-MCNC: 102 NG/DL — SIGNIFICANT CHANGE UP (ref 80–200)
T4 FREE SERPL-MCNC: 1.3 NG/DL — SIGNIFICANT CHANGE UP (ref 0.9–1.7)
TOXICOLOGY SCREEN, DRUGS OF ABUSE, SERUM RESULT: SIGNIFICANT CHANGE UP
TOXICOLOGY SCREEN, DRUGS OF ABUSE, SERUM RESULT: SIGNIFICANT CHANGE UP
TRIGL SERPL-MCNC: 66 MG/DL — SIGNIFICANT CHANGE UP
TROPONIN T, HIGH SENSITIVITY RESULT: 21 NG/L — SIGNIFICANT CHANGE UP
TROPONIN T, HIGH SENSITIVITY RESULT: 21 NG/L — SIGNIFICANT CHANGE UP
TROPONIN T, HIGH SENSITIVITY RESULT: 24 NG/L — SIGNIFICANT CHANGE UP
TSH SERPL-MCNC: 0.43 UIU/ML — SIGNIFICANT CHANGE UP (ref 0.27–4.2)
WBC # BLD: 8.95 K/UL — SIGNIFICANT CHANGE UP (ref 3.8–10.5)
WBC # FLD AUTO: 8.95 K/UL — SIGNIFICANT CHANGE UP (ref 3.8–10.5)

## 2025-01-03 PROCEDURE — 71045 X-RAY EXAM CHEST 1 VIEW: CPT | Mod: 26

## 2025-01-03 PROCEDURE — 0042T: CPT | Mod: MC

## 2025-01-03 PROCEDURE — 70498 CT ANGIOGRAPHY NECK: CPT | Mod: 26,MC

## 2025-01-03 PROCEDURE — 93306 TTE W/DOPPLER COMPLETE: CPT | Mod: 26

## 2025-01-03 PROCEDURE — 70450 CT HEAD/BRAIN W/O DYE: CPT | Mod: 26,59

## 2025-01-03 PROCEDURE — 99291 CRITICAL CARE FIRST HOUR: CPT

## 2025-01-03 PROCEDURE — 99221 1ST HOSP IP/OBS SF/LOW 40: CPT

## 2025-01-03 PROCEDURE — 70496 CT ANGIOGRAPHY HEAD: CPT | Mod: 26,MC

## 2025-01-03 RX ORDER — ACETAMINOPHEN 80 MG/.8ML
650 SOLUTION/ DROPS ORAL EVERY 6 HOURS
Refills: 0 | Status: DISCONTINUED | OUTPATIENT
Start: 2025-01-03 | End: 2025-01-07

## 2025-01-03 RX ORDER — SODIUM CHLORIDE 9 MG/ML
1000 INJECTION, SOLUTION INTRAVENOUS
Refills: 0 | Status: DISCONTINUED | OUTPATIENT
Start: 2025-01-03 | End: 2025-01-07

## 2025-01-03 RX ORDER — INSULIN LISPRO 100/ML
VIAL (ML) SUBCUTANEOUS AT BEDTIME
Refills: 0 | Status: DISCONTINUED | OUTPATIENT
Start: 2025-01-03 | End: 2025-01-07

## 2025-01-03 RX ORDER — SUCRALFATE 1 G/10ML
1 SUSPENSION ORAL
Refills: 0 | Status: DISCONTINUED | OUTPATIENT
Start: 2025-01-03 | End: 2025-01-06

## 2025-01-03 RX ORDER — DEXTROSE MONOHYDRATE 25 G/50ML
15 INJECTION, SOLUTION INTRAVENOUS ONCE
Refills: 0 | Status: DISCONTINUED | OUTPATIENT
Start: 2025-01-03 | End: 2025-01-07

## 2025-01-03 RX ORDER — TRAZODONE HYDROCHLORIDE 150 MG/1
50 TABLET ORAL DAILY
Refills: 0 | Status: DISCONTINUED | OUTPATIENT
Start: 2025-01-03 | End: 2025-01-06

## 2025-01-03 RX ORDER — ATORVASTATIN CALCIUM 40 MG/1
80 TABLET, FILM COATED ORAL AT BEDTIME
Refills: 0 | Status: DISCONTINUED | OUTPATIENT
Start: 2025-01-03 | End: 2025-01-07

## 2025-01-03 RX ORDER — GLUCAGON INJECTION, SOLUTION 0.5 MG/.1ML
1 INJECTION, SOLUTION SUBCUTANEOUS ONCE
Refills: 0 | Status: DISCONTINUED | OUTPATIENT
Start: 2025-01-03 | End: 2025-01-07

## 2025-01-03 RX ORDER — SODIUM CHLORIDE 9 MG/ML
1000 INJECTION, SOLUTION INTRAMUSCULAR; INTRAVENOUS; SUBCUTANEOUS
Refills: 0 | Status: DISCONTINUED | OUTPATIENT
Start: 2025-01-03 | End: 2025-01-04

## 2025-01-03 RX ORDER — PANTOPRAZOLE 40 MG/1
40 TABLET, DELAYED RELEASE ORAL
Refills: 0 | Status: DISCONTINUED | OUTPATIENT
Start: 2025-01-03 | End: 2025-01-06

## 2025-01-03 RX ORDER — PROTHROMBIN COMPLEX CONCENTRATE (HUMAN) 25.5; 16.5; 24; 22; 22; 26 [IU]/ML; [IU]/ML; [IU]/ML; [IU]/ML; [IU]/ML; [IU]/ML
4000 POWDER, FOR SOLUTION INTRAVENOUS ONCE
Refills: 0 | Status: COMPLETED | OUTPATIENT
Start: 2025-01-03 | End: 2025-01-03

## 2025-01-03 RX ORDER — INSULIN LISPRO 100/ML
VIAL (ML) SUBCUTANEOUS
Refills: 0 | Status: DISCONTINUED | OUTPATIENT
Start: 2025-01-03 | End: 2025-01-07

## 2025-01-03 RX ORDER — DEXTROSE MONOHYDRATE 25 G/50ML
12.5 INJECTION, SOLUTION INTRAVENOUS ONCE
Refills: 0 | Status: DISCONTINUED | OUTPATIENT
Start: 2025-01-03 | End: 2025-01-07

## 2025-01-03 RX ORDER — DEXTROSE MONOHYDRATE 25 G/50ML
25 INJECTION, SOLUTION INTRAVENOUS ONCE
Refills: 0 | Status: DISCONTINUED | OUTPATIENT
Start: 2025-01-03 | End: 2025-01-07

## 2025-01-03 RX ORDER — ALBUTEROL SULFATE 90 UG/1
2 INHALANT RESPIRATORY (INHALATION) EVERY 6 HOURS
Refills: 0 | Status: DISCONTINUED | OUTPATIENT
Start: 2025-01-03 | End: 2025-01-07

## 2025-01-03 RX ADMIN — SODIUM CHLORIDE 75 MILLILITER(S): 9 INJECTION, SOLUTION INTRAMUSCULAR; INTRAVENOUS; SUBCUTANEOUS at 16:18

## 2025-01-03 RX ADMIN — SUCRALFATE 1 GRAM(S): 1 SUSPENSION ORAL at 18:39

## 2025-01-03 RX ADMIN — ACETAMINOPHEN 650 MILLIGRAM(S): 80 SOLUTION/ DROPS ORAL at 18:39

## 2025-01-03 RX ADMIN — ACETAMINOPHEN 650 MILLIGRAM(S): 80 SOLUTION/ DROPS ORAL at 13:21

## 2025-01-03 RX ADMIN — PROTHROMBIN COMPLEX CONCENTRATE (HUMAN) 400 INTERNATIONAL UNIT(S): 25.5; 16.5; 24; 22; 22; 26 POWDER, FOR SOLUTION INTRAVENOUS at 18:02

## 2025-01-03 NOTE — ED PROVIDER NOTE - DATE/TIME 1
-Do not place anything (no partner, tampons or douche) in your vagina for 6 weeks.     -You may walk for exercise for the first 6 weeks then gradually return to your usual activities.      -Please do not drive for 1 week if you have no stitches and for 2 weeks if you have stitches or underwent a  delivery.       -You may take baths or shower per your preference.      -Please look at your bust (breasts) in the mirror daily and call your doctor for redness or tenderness or increased warmth.      - If you have had a  section please look at your incision daily as well and call provider for increasing redness or steady drainage from the incision.      -Please call your doctor's office if temperature > 100.4*F or 38* C, worsening pain or a foul  
We received a remote transmission form patient's monitor at home. Remote Linq report shows no arrhythmias. EP physician to review. We will continue to monitor remotely.
03-Jan-2025 09:56

## 2025-01-03 NOTE — CONSULT NOTE ADULT - CRITICAL CARE ATTENDING COMMENT
59 RH M PMHx pre-DM, Afib on Xarelto, bariatric surgery 2015 presented as code stroke for altered mental status, found to have urinary incontience, also noted to have R facial? palsy and dysarthria. Confused on resident exam, now AAOx3, subtle LUE and LLE drift and mild L hemisensory deficit. R side intact.  CTH with small acute interhemispheic 1.8cm SDH, 4h scan stable  CTA neg  xalreto reversed with Kcentra    Nontraumatic SDH  Encephalopathy w/ urinary incontinence - suspicious for seizure    - hold AC/AP  - needs 24h CTH  - SBP < 140  - appreciate NSGY  - MRI brain when able  - eeg  - rest of workup as above   - SCDs for now

## 2025-01-03 NOTE — ED ADULT NURSE NOTE - OBJECTIVE STATEMENT
Panchito RN: Pt is a Code stroke and brought straight to CT. Pt is A&Ox3 and baseline as per wife A&Ox4. Past medical history of DM2, HTN. Pt came to the ED due to waken up around 6:30am by wife because he had incontinence and then slurred speech. Pt was right side weakness, B/L eyes are reactive to light. last known well was last night at 2230. NIH scale documented in flow sheet. Pt breathing is equal and nonlabored. Pt came in with EMS 20g to the right AC. Pt denies any chest pain, SOB, headache, nausea, vomiting, diarrhea, fever or chills. Pt c/o of abdominal pain. Pt inside CT. Report given to Primary RN and Float RN. Pt safety maintained.

## 2025-01-03 NOTE — SPEECH LANGUAGE PATHOLOGY EVALUATION - COMMENTS
Monitor for any changes in neurological status that may impact speech/ language out put. patient reports speech output to be at baseline at this time. Patient able to effectively communicate basic wants/ needs/ thoughts at the conversational level Nuerosurgery 1/3, "59yM pmhx CVA (2010 w/ rt side weakness) DM, HTN, asthma, SHARMIN, AFIB (on xarelto, last dose yesterday) pw slurred speech, self-urination, confusion to ED. Last known normal last night 10:30pm. Denies trauma, pt is right handed. Platelets WNL. PTT 39.6, PT 17.5, INR 1.51. Neuro following suggesting to continue xarelto for secondary stroke prevention. They suspect seizure/toxic metabolic encephalopathy over acute stroke. Pt being admitted to telemetry."     CXR 1/3: IMPRESSION: Clear lungs.    CTA Brain 1/3: IMPRESSION: Unremarkable CTA of neck and [place] of [place].    CT Brain 1/3: *** ADDENDUM # 1 *** Tiny linear area of extra-axial high attenuation seen along the anterior interhemispheric region. This best seen on series 303 image 48. The possibility of a small acute subdural hematoma cannot be entirely excluded. Please correlate clinically. This finding does measure approximately 1.8 mm in widest diameter. This finding is new when compared prior head CT performed on February 9, 2022    Patient received lying on stretcher in ED, awake/ alert, Ox4, reports being "starving," denies difficulty with swallowing. Patient reports speech output is "normal." However, given prior dysarthria noted, speech language evaluation completed. Bedside swallow evaluation completed see note.

## 2025-01-03 NOTE — SWALLOW BEDSIDE ASSESSMENT ADULT - COMMENTS
Nuerosurgery 1/3, "59yM pmhx CVA (2010 w/ rt side weakness) DM, HTN, asthma, SHARMIN, AFIB (on xarelto, last dose yesterday) pw slurred speech, self-urination, confusion to ED. Last known normal last night 10:30pm. Denies trauma, pt is right handed. Platelets WNL. PTT 39.6, PT 17.5, INR 1.51. Neuro following suggesting to continue xarelto for secondary stroke prevention. They suspect seizure/toxic metabolic encephalopathy over acute stroke. Pt being admitted to telemetry."     CXR 1/3: IMPRESSION: Clear lungs.    CTA Brain 1/3: IMPRESSION: Unremarkable CTA of neck and [place] of [place].    CT Brain 1/3: *** ADDENDUM # 1 *** Tiny linear area of extra-axial high attenuation seen along the anterior interhemispheric region. This best seen on series 303 image 48. The possibility of a small acute subdural hematoma cannot be entirely excluded. Please correlate clinically. This finding does measure approximately 1.8 mm in widest diameter. This finding is new when compared prior head CT performed on February 9, 2022    Patient received lying on stretcher in ED, awake/ alert, Ox4, reports being "starving," denies difficulty with swallowing. Patient reports speech output is "normal." However, given prior dysarthria noted, speech language evaluation completed.

## 2025-01-03 NOTE — ED PROVIDER NOTE - CLINICAL SUMMARY MEDICAL DECISION MAKING FREE TEXT BOX
59-year-old male past medical history of A-fib on Xarelto, borderline diabetic, and hypertension, presents to the ED accompanied by wife with altered mental status. HD stable. Imp: concern for CVA. Labs and imaging studies per code stroke protocol.

## 2025-01-03 NOTE — SWALLOW BEDSIDE ASSESSMENT ADULT - SLP PATIENT PROFILE REVIEW
H&P 1/3, "59-year-old male past medical history of A-fib on Xarelto, borderline diabetic, and hypertension, presents to the ED accompanied by wife with altered mental status.  Patient was last known normal 1030 last night.  As per wife, 6:30 AM this morning woke up unresponsive, wife states that patient was not responsive when asked to wake up to go move his call.  He also had an episode of incontinence.  In the ED noted with slurred speech."/yes

## 2025-01-03 NOTE — H&P ADULT - ASSESSMENT
59-year-old male past medical history of A-fib on Xarelto, borderline diabetic, and hypertension, presents to the ED accompanied by wife with altered mental status.  Patient was last known normal 1030 last night.  As per wife, 6:30 AM this morning woke up unresponsive, wife states that patient was not responsive when asked to wake up to go move his call.  He also had an episode of incontinence.  In the ED noted with slurred speech.    AMS  - sp code stroke  - imaging reviewed  - check MRI head  - neuro fu   - speech and swallow     SDH  - nontraumatic  - fu repeat cT head  - hold xarelto  - neurosurgery fu appreciated    Afib  - telemonitor  - [place] AC  - cards fu appreciated  - given Kcentra for xarelto reversalwith plans for  non contrast CTH 4 hr and in AM 1/4. Holding Xareltro    dw pt and family

## 2025-01-03 NOTE — SWALLOW BEDSIDE ASSESSMENT ADULT - H & P REVIEW
Neuro 1/3, "Assessment: 59 RH M PMHx pre-DM, Afib on Xarelto, bariatric surgery 2015 presented as code stroke for altered mental status. Was in usual state on 1/2 at 2230, this morning his wife had difficulty arousing him, had episode of urinary incontinence in the bed. When he woke up, patient appeared altered and dysarthric, EMS called and seen as code stroke in ED. Initial VS in ED: /60 . Exam: Initial exam, patient AAOx3 but with confusion and unable to follow all simple commands. Also with severe dysarthria. Subtle R facial droop, subjective numbness RUE. Bilateral lower extremity weakness. CT head demonstrated possible tiny acute SDH. Patient denies recent trauma, no evidence of trauma on exam. CTA/perfusion unremarkable. Upon subsequent exam in ED, dysarthria, confusion, right-sided deficits, and lower extremity weakness resolved. Patient endorsed vertigo brought about by standing."/yes

## 2025-01-03 NOTE — OCCUPATIONAL THERAPY INITIAL EVALUATION ADULT - DIAGNOSIS, OT EVAL
s/p AMS, s/p acute subdural hematoma; decreased functional mobility, decreased ADL performance, left sided weakness

## 2025-01-03 NOTE — CONSULT NOTE ADULT - NS ATTEND AMEND GEN_ALL_CORE FT
59yM pmhx CVA (2010 w/ rt side weakness) DM, HTN, asthma, SHARMIN, AFIB (on xarelto, last dose yesterday) pw slurred speech, self-urination, confusion to ED. Last known normal last night 10:30pm. Denies trauma, pt is right handed. Platelets WNL. PTT 39.6, PT 17.5, INR 1.51. Neuro following suggesting to continue xarelto for secondary stroke prevention. They suspect seizure/toxic metabolic encephalopathy over acute stroke. Pt being admitted to telemetry.  - hold ACAP   - give Kcentra for xarelto reversal with goal INR goal < 1.4  - rpt non contrast CTH 4 hr and in AM 1/4  - AEDs per neurology

## 2025-01-03 NOTE — SWALLOW BEDSIDE ASSESSMENT ADULT - SWALLOW EVAL: DIAGNOSIS
1. Functional oral stage for puree, regular solids and thin liquids characterized by adequate acceptance and containment, adequate mastication of regular solids, adequate anterior to posterior transport with adequate oral clearance. 2. Functional pharyngeal stage for the aforementioned consistencies characterized by initiation of the pharyngeal swallow upon digital palpation with no overt s/s penetration/ aspiration noted.

## 2025-01-03 NOTE — CONSULT NOTE ADULT - SUBJECTIVE AND OBJECTIVE BOX
**STROKE CODE CONSULT NOTE**    Last known well time/Time of onset of symptoms: 1/2 @2230    HPI: David Kraft is a 59-year-old right-handed male with PMHx pre-DM, Afib on Xarelto, gastric bypass 2015, asthma, GERD who presented to the ED as code stroke for altered mental status. Patient was last known normal yesterday evening at 2230 when he returned from going out to dinner with friends. Per wife, he drove himself home and seemed at baseline when he returned. This morning at 0600, patient's wife tried to wake him up to move the car, however she had difficulty arousing the patient. When he did wake up, he seemed confused with slurred speech. She also saw that he had urinated in the bed. EMS was called and patient brought to the ED. Initial /60, .   In the ED, patient endorses right hip pain, which he states has been present for the past few days. Patient denies alcohol use, recent fall/trauma, history of seizures. Patient reports recent episodes of vertigo for which he has been taking meclizine.    PAST MEDICAL & SURGICAL HISTORY:  H/O: Obesity  s/p gastric sleeve in 2015, lost ~75 lbs      DM (diabetes mellitus)  resolved with weight loss surgery      Hypertension  on diet control      Asthma  intubated x 2 in 1992, hospitalized in 2011, currently stable, last used rescue inhaler 7 months ago      SHARMIN (obstructive sleep apnea)  resolved after weight loss surgery      Pilonidal cyst with abscess  had surgery      Lower back pain      GERD (gastroesophageal reflux disease)      S/P tonsillectomy      S/P appendectomy      S/P hernia repair  bilateral inguinal hernia repair      S/P laparoscopic sleeve gastrectomy  2015      S/P sinus surgery  with septoplasty          FAMILY HISTORY:  Family history of diabetes mellitus (DM)  mother - also CVA    Family history of cerebrovascular accident (CVA) in father    FH: leukemia  brother        SOCIAL HISTORY:  Smoking Cessation: Discussed    ROS:  Constitutional: No fever, weight loss or fatigue  Eyes: No eye pain, visual disturbances, or discharge  ENMT:  No difficulty hearing, tinnitus, vertigo; No sinus or throat pain  Neck: No pain or stiffness  Respiratory: No cough, wheezing, chills or hemoptysis  Cardiovascular: No chest pain, palpitations, shortness of breath, dizziness or leg swelling  Gastrointestinal: No abdominal pain. No nausea, vomiting or hematemesis; No diarrhea or constipation. Nohematochezia.  Genitourinary: No dysuria, frequency, hematuria or incontinence  Neurological: As per HPI  Skin: No itching, burning, rashes or lesions   Endocrine: No heat or cold intolerance; No hair loss  Musculoskeletal: No joint pain or swelling; No muscle, back or extremity pain  Psychiatric: No depression, anxiety, mood swings or difficulty sleeping  Heme/Lymph: No easy bruising or bleeding gums    MEDICATIONS  (STANDING):    MEDICATIONS  (PRN):      Allergies    Avelox (Unknown)    Intolerances        Vital Signs Last 24 Hrs  T(C): 37.2 (03 Jan 2025 07:47), Max: 37.2 (03 Jan 2025 07:47)  T(F): 98.9 (03 Jan 2025 07:47), Max: 98.9 (03 Jan 2025 07:47)  HR: 110 (03 Jan 2025 07:47) (110 - 110)  BP: 106/60 (03 Jan 2025 07:47) (106/60 - 106/60)  BP(mean): --  RR: 18 (03 Jan 2025 07:47) (18 - 18)  SpO2: 99% (03 Jan 2025 07:47) (99% - 99%)    Parameters below as of 03 Jan 2025 07:47  Patient On (Oxygen Delivery Method): room air        PHYSICAL EXAM:  Constitutional: WDWN; NAD  Cardiovascular: RRR, no appreciable murmurs; no carotid bruits  Neurologic:  Mental status: Awake, alert and oriented x3.  Recent and remote memory intact.  Naming, repetition and comprehension intact.  Attention/concentration intact.  No dysarthria, no aphasia.  Fund of knowledge appropriate.    Cranial nerves: Fundoscopic exam demonstrated no abnormalities, pupils equally round and reactive to light, visual fields full, no nystagmus, extraocular muscles intact, V1 through V3 intact bilaterally and symmetric, face symmetric, hearing intact to finger rub, palate elevation symmetric, tongue was midline, sternocleidomastoid/shoulder shrug strength bilaterally 5/5.    Motor:  Normal bulk and tone, strength 5/5 in bilateral upper and lower extremities.   strength 5/5.  Rapid alternating movements intact and symmetric.   Sensation: Intact to light touch, proprioception, and pinprick.  No neglect.   Coordination: No dysmetria on finger-to-nose and heel-to-shin.  No clumsiness.  Reflexes: 2+ in upper and lower extremities, downgoing toes bilaterally  Gait: Narrow and steady. No ataxia.  Romberg negative    NIHSS:    Fingerstick Blood Glucose: CAPILLARY BLOOD GLUCOSE      POCT Blood Glucose.: 129 mg/dL (03 Jan 2025 07:46)       LABS:                        13.3   8.95  )-----------( 273      ( 03 Jan 2025 07:55 )             42.2           PT/INR - ( 03 Jan 2025 07:55 )   PT: 17.5 sec;   INR: 1.51 ratio         PTT - ( 03 Jan 2025 07:55 )  PTT:39.6 sec          RADIOLOGY & ADDITIONAL STUDIES:    IV-tenecteplase(Y/N):                                   Bolus time:  Reason IV-tenecteplase not given:   **STROKE CODE CONSULT NOTE**    Last known well time/Time of onset of symptoms: 1/2 @2230    HPI: David Kraft is a 59-year-old right-handed male with PMHx pre-DM, Afib on Xarelto, gastric bypass 2015, asthma, GERD who presented to the ED as code stroke for altered mental status. Patient was last known normal yesterday evening at 2230 when he returned from going out to dinner with friends. Per wife, he drove himself home and seemed at baseline when he returned. This morning at 0600, patient's wife tried to wake him up to move the car, however she had difficulty arousing the patient. When he did wake up, he seemed confused with slurred speech. She also saw that he had urinated in the bed. EMS was called and patient brought to the ED. Initial /60, .   In the ED, patient endorses right hip pain, which he states has been present for the past few days. Patient denies alcohol use, recent fall/trauma, history of seizures. Patient reports recent episodes of vertigo for which he has been taking meclizine.    PAST MEDICAL & SURGICAL HISTORY:  H/O: Obesity  s/p gastric sleeve in 2015, lost ~75 lbs      DM (diabetes mellitus)  resolved with weight loss surgery      Hypertension  on diet control      Asthma  intubated x 2 in 1992, hospitalized in 2011, currently stable, last used rescue inhaler 7 months ago      SHARMIN (obstructive sleep apnea)  resolved after weight loss surgery      Pilonidal cyst with abscess  had surgery      Lower back pain      GERD (gastroesophageal reflux disease)      S/P tonsillectomy      S/P appendectomy      S/P hernia repair  bilateral inguinal hernia repair      S/P laparoscopic sleeve gastrectomy  2015      S/P sinus surgery  with septoplasty          FAMILY HISTORY:  Family history of diabetes mellitus (DM)  mother - also CVA    Family history of cerebrovascular accident (CVA) in father    FH: leukemia  brother        SOCIAL HISTORY:  Smoking Cessation: Nonsmoker    ROS:  Constitutional: No fever, weight loss or fatigue  Eyes: No eye pain, visual disturbances, or discharge  ENMT:  No difficulty hearing, tinnitus, vertigo; No sinus or throat pain  Neck: No pain or stiffness  Respiratory: No cough, wheezing, chills or hemoptysis  Cardiovascular: No chest pain, palpitations, shortness of breath, dizziness or leg swelling  Gastrointestinal: No abdominal pain. No nausea, vomiting or hematemesis; No diarrhea or constipation. Nohematochezia.  Genitourinary: No dysuria, frequency, hematuria or incontinence  Neurological: As per HPI  Skin: No itching, burning, rashes or lesions   Endocrine: No heat or cold intolerance; No hair loss  Musculoskeletal: No joint pain or swelling; +R hip pain  Psychiatric: No depression, anxiety, mood swings or difficulty sleeping  Heme/Lymph: No easy bruising or bleeding gums    MEDICATIONS  (STANDING):    MEDICATIONS  (PRN):      Allergies    Avelox (Unknown)    Intolerances        Vital Signs Last 24 Hrs  T(C): 37.2 (03 Jan 2025 07:47), Max: 37.2 (03 Jan 2025 07:47)  T(F): 98.9 (03 Jan 2025 07:47), Max: 98.9 (03 Jan 2025 07:47)  HR: 110 (03 Jan 2025 07:47) (110 - 110)  BP: 106/60 (03 Jan 2025 07:47) (106/60 - 106/60)  BP(mean): --  RR: 18 (03 Jan 2025 07:47) (18 - 18)  SpO2: 99% (03 Jan 2025 07:47) (99% - 99%)    Parameters below as of 03 Jan 2025 07:47  Patient On (Oxygen Delivery Method): room air        PHYSICAL EXAM:  Constitutional: Anxious appearing    Neurologic:  Mental status: Awake and alert, oriented x3. Naming and repetition intact. Follows some simple commands with occasional confusion. Moderate-severe dysarthria.    -On subsequent exam, following all commands, dysarthria resolved.  Cranial nerves: Pupils equally 2 mm round and reactive to light, visual fields full, no nystagmus, extraocular muscles intact, V1 through V3 intact bilaterally and symmetric, initially with ?decreased activation R face, tongue was midline, sternocleidomastoid/shoulder shrug strength bilaterally 5/5.    -On subsequent exam no evidence of facial droop   Motor:  Normal bulk and tone, moves all extremities antigravity. Initially with drift bilateral lower extremities.  strength 5/5.     -On subsequent exam 5/5 strength in bilateral lower extremities  Sensation: Initially with decreased light touch to RUE. Upon subsequent exam reported sensation symmetric. No neglect.   Coordination: Initially with difficulty performing finger to nose bilaterally. Upon subsequent exam no dysmetria.  Reflexes: 2+ in upper extremities, R patellar 3+ L patellar 2+, ankles 1+ bilaterally, downgoing toes bilaterally  Gait: Able to stand independently, reports dizziness with standing. Able to ambulate with minimal assistance.    NIHSS: Initial     Fingerstick Blood Glucose: CAPILLARY BLOOD GLUCOSE      POCT Blood Glucose.: 129 mg/dL (03 Jan 2025 07:46)       LABS:                        13.3   8.95  )-----------( 273      ( 03 Jan 2025 07:55 )             42.2           PT/INR - ( 03 Jan 2025 07:55 )   PT: 17.5 sec;   INR: 1.51 ratio         PTT - ( 03 Jan 2025 07:55 )  PTT:39.6 sec          RADIOLOGY & ADDITIONAL STUDIES:    IV-tenecteplase(Y/N):                                   Bolus time:  Reason IV-tenecteplase not given:   **STROKE CODE CONSULT NOTE**    Last known well time/Time of onset of symptoms: 1/2 @2230    HPI: David Kraft is a 59-year-old right-handed male with PMHx pre-DM, Afib on Xarelto, gastric bypass 2015, asthma, GERD who presented to the ED as code stroke for altered mental status. Patient was last known normal yesterday evening at 2230 when he returned from going out to dinner with friends. Per wife, he drove himself home and seemed at baseline when he returned. This morning at 0600, patient's wife tried to wake him up to move the car, however she had difficulty arousing the patient. When he did wake up, he seemed confused with slurred speech. She also saw that he had urinated in the bed. EMS was called and patient brought to the ED. Initial /60, .   Patient AAox3 at baseline, works at [employer].  In the ED, patient endorses right hip pain, which he states has been present for the past few days. Patient denies alcohol use, recent fall/trauma, history of seizures. He reports history of stroke in 1990s, states he had right-side weakness at the time.  Patient reports recent episodes of vertigo for which he has been taking meclizine.    PAST MEDICAL & SURGICAL HISTORY:  H/O: Obesity  s/p gastric sleeve in 2015, lost ~75 lbs      DM (diabetes mellitus)  resolved with weight loss surgery      Hypertension  on diet control      Asthma  intubated x 2 in 1992, hospitalized in 2011, currently stable, last used rescue inhaler 7 months ago      SHARMIN (obstructive sleep apnea)  resolved after weight loss surgery      Pilonidal cyst with abscess  had surgery      Lower back pain      GERD (gastroesophageal reflux disease)      S/P tonsillectomy      S/P appendectomy      S/P hernia repair  bilateral inguinal hernia repair      S/P laparoscopic sleeve gastrectomy  2015      S/P sinus surgery  with septoplasty          FAMILY HISTORY:  Family history of diabetes mellitus (DM)  mother - also CVA    Family history of cerebrovascular accident (CVA) in father    FH: leukemia  brother        SOCIAL HISTORY:  Smoking Cessation: Nonsmoker    ROS:  Constitutional: No fever, weight loss or fatigue  Eyes: No eye pain, visual disturbances, or discharge  ENMT:  No difficulty hearing, tinnitus, vertigo; No sinus or throat pain  Neck: No pain or stiffness  Respiratory: No cough, wheezing, chills or hemoptysis  Cardiovascular: No chest pain, palpitations, shortness of breath, dizziness or leg swelling  Gastrointestinal: No abdominal pain. No nausea, vomiting or hematemesis; No diarrhea or constipation. No hematochezia.  Genitourinary: No dysuria, frequency, hematuria or incontinence  Neurological: As per HPI  Skin: No itching, burning, rashes or lesions   Endocrine: No heat or cold intolerance; No hair loss  Musculoskeletal: No joint pain or swelling; +R hip pain  Psychiatric: No depression, anxiety, mood swings or difficulty sleeping  Heme/Lymph: No easy bruising or bleeding gums    MEDICATIONS  (STANDING):    MEDICATIONS  (PRN):      Allergies    Avelox (Unknown)    Intolerances        Vital Signs Last 24 Hrs  T(C): 37.2 (03 Jan 2025 07:47), Max: 37.2 (03 Jan 2025 07:47)  T(F): 98.9 (03 Jan 2025 07:47), Max: 98.9 (03 Jan 2025 07:47)  HR: 110 (03 Jan 2025 07:47) (110 - 110)  BP: 106/60 (03 Jan 2025 07:47) (106/60 - 106/60)  BP(mean): --  RR: 18 (03 Jan 2025 07:47) (18 - 18)  SpO2: 99% (03 Jan 2025 07:47) (99% - 99%)    Parameters below as of 03 Jan 2025 07:47  Patient On (Oxygen Delivery Method): room air        PHYSICAL EXAM:  Constitutional: Anxious appearing  HEENT: Atraumatic    Neurologic:  Mental status: Awake and alert, oriented x3. Naming and repetition intact. Follows some simple commands with occasional confusion. Moderate-severe dysarthria.    -On subsequent exam, following all commands, dysarthria resolved.  Cranial nerves: Pupils equally 2 mm round and reactive to light, visual fields full, no nystagmus, extraocular muscles intact, V1 through V3 intact bilaterally and symmetric, initially with ?decreased activation R face, tongue was midline, sternocleidomastoid/shoulder shrug strength bilaterally 5/5.    -On subsequent exam no evidence of facial droop   Motor:  Normal bulk and tone, moves all extremities antigravity. Initially with drift bilateral lower extremities.  strength 5/5.     -On subsequent exam 5/5 strength in bilateral lower extremities  Sensation: Initially with decreased light touch to RUE. Upon subsequent exam reported sensation symmetric. No neglect.   Coordination: Initially with difficulty performing finger to nose bilaterally. Upon subsequent exam no dysmetria.  Reflexes: 2+ in upper extremities, R patellar 3+ L patellar 2+, ankles 1+ bilaterally, downgoing toes bilaterally  Gait: Able to stand independently, reports dizziness with standing. Able to ambulate with minimal assistance.    NIHSS: Initial 9, subsequent exam 0    Fingerstick Blood Glucose: CAPILLARY BLOOD GLUCOSE      POCT Blood Glucose.: 129 mg/dL (03 Jan 2025 07:46)       LABS:                        13.3   8.95  )-----------( 273      ( 03 Jan 2025 07:55 )             42.2           PT/INR - ( 03 Jan 2025 07:55 )   PT: 17.5 sec;   INR: 1.51 ratio         PTT - ( 03 Jan 2025 07:55 )  PTT:39.6 sec          RADIOLOGY & ADDITIONAL STUDIES:    < from: CT Brain Stroke Protocol (01.03.25 @ 08:10) >  The lateral ventricles have a normal    Prominent cystic area involving the left sublenticular region is again   seen unchanged    There is no acute hemorrhage mass or mass effect seen.    Left maxillary polyp versus retention cyst is seen    Both mastoid and meniscal clear.    IMPRESSION: Stable noncontrast head CT.    < end of copied text >  < from: CT Brain Stroke Protocol (01.03.25 @ 08:10) >  *** ADDENDUM # 1 ***    Tiny linear area of extra-axial high attenuation seen along the anterior   interhemispheric region. This best seen on series 303 image 48. The   possibility of a small acute subdural hematoma cannot be entirely   excluded. Please correlate clinically. This finding does measure   approximately 1.8 mm in widest diameter. This finding is new when   compared prior head CT performed on February 9, 2022    < end of copied text >  < from: CT Angio Neck Stroke Protocol w/ IV Cont (01.03.25 @ 08:10) >  CBF<30%: 0.0 mL  MAXIMUM TEMPERATURE> 6.0F: 0.0 mL  Mismatch volume: 0.0 mL  Mismatch ratio: None    Both distal common carotid, proximal internal and external carotid   arteries appear normal as well as both vertebral arteries. No significant   stenosis is seen.    Both distal internal carotid arteries appear normal    Hypoplastic right A1 segment is seen. Both anterior cerebral middle   cerebral basilar and posterior cerebral arteries appear normal without   evidence of an aneurysm or significant stenosis    The dural venous sinuses demonstrate normal enhancement    Evaluation soft tissue neck region appears normal    The visualized was both lung apices appear clear. Postop changes   involving the cervical spine are seen with degenerative changes.    IMPRESSION: Unremarkable CTA of neck and [place].    CT perfusion data as described above.    < end of copied text >

## 2025-01-03 NOTE — OCCUPATIONAL THERAPY INITIAL EVALUATION ADULT - PERTINENT HX OF CURRENT PROBLEM, REHAB EVAL
59-year-old male past medical history of A-fib on Xarelto, borderline diabetic, and hypertension, presents to the ED accompanied by wife with altered mental status with episode of incontinence. CT brain revealing possible acute subdural hematoma. Admitted for further management.

## 2025-01-03 NOTE — OCCUPATIONAL THERAPY INITIAL EVALUATION ADULT - GENERAL OBSERVATIONS, REHAB EVAL
Patient received semisupine in bed in NAD; agreeable to participate in OT evaluation. +telemetry monitor. +IV. HR 87 bpm.

## 2025-01-03 NOTE — SWALLOW BEDSIDE ASSESSMENT ADULT - ASR SWALLOW RECOMMEND DIAG
Objective testing Not warranted at this time given No overt s/s penetration/ aspiration noted and clear lungs indicated on recent chest imaging

## 2025-01-03 NOTE — CONSULT NOTE ADULT - ASSESSMENT
59yM pmhx CVA (2010 w/ rt side weakness) DM, HTN, asthma, SHARMIN, AFIB (on xarelto, last dose yesterday) pw slurred speech, self-urination, confusion to ED. Last known normal last night 10:30pm. Denies trauma, pt is right handed. Platelets WNL. PTT 39.6, PT 17.5, INR 1.51. Neuro following suggesting to continue xarelto for secondary stroke prevention. They suspect seizure/toxic metabolic encephalopathy over acute stroke. Pt being admitted to telemetry.

## 2025-01-03 NOTE — CONSULT NOTE ADULT - PROBLEM SELECTOR RECOMMENDATION 9
- hold ACAP   - give Kcentra for xarelto reversal   - INR goal < 1.4  - rpt non contrast CTH 4 hr and in AM  - AEDs per neurology   - keep NPO     [id number]    Case discussed with attending neurosurgeon Dr. Carrillo - hold ACAP   - give Kcentra for xarelto reversal   - INR goal < 1.4  - rpt non contrast CTH 4 hr and in AM 1/4  - AEDs per neurology   - NPO w/ IVF    [id number]    Case discussed with attending neurosurgeon Dr. Carrillo

## 2025-01-03 NOTE — H&P ADULT - NSHPLABSRESULTS_GEN_ALL_CORE
Lab Results:  CBC  CBC Full  -  ( 03 Jan 2025 07:55 )  WBC Count : 8.95 K/uL  RBC Count : 5.65 M/uL  Hemoglobin : 13.3 g/dL  Hematocrit : 42.2 %  Platelet Count - Automated : 273 K/uL  Mean Cell Volume : 74.7 fL  Mean Cell Hemoglobin : 23.5 pg  Mean Cell Hemoglobin Concentration : 31.5 g/dL  Auto Neutrophil # : 7.52 K/uL  Auto Lymphocyte # : 0.93 K/uL  Auto Monocyte # : 0.41 K/uL  Auto Eosinophil # : 0.00 K/uL  Auto Basophil # : 0.04 K/uL  Auto Neutrophil % : 84.0 %  Auto Lymphocyte % : 10.4 %  Auto Monocyte % : 4.6 %  Auto Eosinophil % : 0.0 %  Auto Basophil % : 0.4 %    .		Differential:	[] Automated		[] Manual  Chemistry                        13.3   8.95  )-----------( 273      ( 03 Jan 2025 07:55 )             42.2     01-03    139  |  105  |  14  ----------------------------<  151[H]  4.1   |  23  |  0.59    Ca    8.6      03 Jan 2025 07:55    TPro  7.0  /  Alb  3.5  /  TBili  0.3  /  DBili  x   /  AST  22  /  ALT  17  /  AlkPhos  83  01-03    LIVER FUNCTIONS - ( 03 Jan 2025 07:55 )  Alb: 3.5 g/dL / Pro: 7.0 g/dL / ALK PHOS: 83 U/L / ALT: 17 U/L / AST: 22 U/L / GGT: x           PT/INR - ( 03 Jan 2025 07:55 )   PT: 17.5 sec;   INR: 1.51 ratio         PTT - ( 03 Jan 2025 07:55 )  PTT:39.6 sec  Urinalysis Basic - ( 03 Jan 2025 07:55 )    Color: x / Appearance: x / SG: x / pH: x  Gluc: 151 mg/dL / Ketone: x  / Bili: x / Urobili: x   Blood: x / Protein: x / Nitrite: x   Leuk Esterase: x / RBC: x / WBC x   Sq Epi: x / Non Sq Epi: x / Bacteria: x            MICROBIOLOGY/CULTURES:      RADIOLOGY RESULTS:

## 2025-01-03 NOTE — ED PROVIDER NOTE - OBJECTIVE STATEMENT
59-year-old male past medical history of A-fib on Xarelto, borderline diabetic, and hypertension, presents to the ED accompanied by wife with altered mental status.  Patient was last known normal 1030 last night.  As per wife, 6:30 AM this morning woke up unresponsive, wife states that patient was not responsive when asked to wake up to go move his call.  He also had an episode of incontinence.  In the ED noted with slurred speech.

## 2025-01-03 NOTE — ED PROVIDER NOTE - CRITICAL CARE ATTENDING CONTRIBUTION TO CARE
Attending note:   After face to face evaluation of this patient, I concur with above noted hx, pe, and care plan for this patient.  Lao: 59-year-old male with past medical history of borderline diabetes and low blood pressure.  Patient presents to the ED with last known normal at 1030 last night.  Patient had gone out last night at a dinner with friends and came on at 1030 and at that time with noted him to be normal.  This morning at 6:30 AM wife tried to wake him up to have him move his car but patient was unresponsive with an episode of incontinence.  Patient was noted to be slurring his speech and confused.  EMS was called.  They noted drift and weakness and slurred speech and patient a stroke code was called in triage.  Patient's blood pressure is normal to slightly low but patient is notably borderline tachycardic and afebrile.  Patient is having slurred speech is difficult to understand.  Patient is awake and alert and responsive.  Patient makes attempts to answer questions but answers are mildly intelligible.  Patient is aware of place and person but not time.  There is no facial asymmetry noted.  There is no droop noted.  There is a right upper extremity weakness noted.  When pronator drift is assessed patient had wavering movement of the right upper extremity.  Both lower extremities show poor effort and it is difficult to assess.  This exam was done outside of CAT scan.  Full assessment to follow.  A full CT was ordered including perfusion studies and angiogram.  Neuro is also at bedside. Attending note:   After face to face evaluation of this patient, I concur with above noted hx, pe, and care plan for this patient.  Lao: 59-year-old male with past medical history of borderline diabetes and low blood pressure.  Patient presents to the ED with last known normal at 1030 last night.  Patient had gone out last night at a dinner with friends and came on at 1030 and at that time with noted him to be normal.  This morning at 6:30 AM wife tried to wake him up to have him move his car but patient was unresponsive with an episode of incontinence.  Patient was noted to be slurring his speech and confused.  EMS was called.  They noted drift and weakness and slurred speech and patient a stroke code was called in triage.  Patient's blood pressure is normal to slightly low but patient is notably borderline tachycardic and afebrile.  Patient is having slurred speech is difficult to understand.  Patient is awake and alert and responsive.  Patient makes attempts to answer questions but answers are mildly intelligible.  Patient is aware of place and person but not time.  There is no facial asymmetry noted.  There is no droop noted.  There is a right upper extremity weakness noted.  When pronator drift is assessed patient had wavering movement of the right upper extremity.  Both lower extremities show poor effort and it is difficult to assess.  This exam was done outside of CAT scan.  Full assessment to follow.  A full CT was ordered including perfusion studies and angiogram.  Neuro is also at bedside.    UPDATE 840AM: Patient has significant improvement in his speech.  Patient's words are now able to be understood.  Tenderness.  He is also able to move his lower extremities better but still weak.  Unsure if this was a seizure with postictal.  Likely not a tenecteplase candidate given improvement.  Case was discussed with patient PMD Dr. Siddhartha Pena mentions that patient has a history of paroxysmal A-fib on Xarelto.  Awaiting formal recs and labs to look for any metabolic or infectious causes.

## 2025-01-03 NOTE — CONSULT NOTE ADULT - ASSESSMENT
Assessment: 59 RH M PMHx pre-DM, Afib on Xarelto, bariatric surgery 2015 presented as code stroke for altered mental status. Was in usual state on 1/2 at 2230, this morning his wife had difficulty arousing him, had episode of urinary incontinence in the bed. When he woke up, patient appeared altered and dysarthric, EMS called and seen as code stroke in ED. Initial VS in ED: /60 . Exam: Initial exam, patient AAOx3 but with confusion and unable to follow all simple commands. Also with severe dysarthria. Subtle R facial droop, subjective numbness RUE. Bilateral lower extremity weakness.   CT head demonstrated possible tiny acute SDH. Patient denies recent trauma, no evidence of trauma on exam. CTA/perfusion unremarkable.    Upon subsequent exam in ED, dysarthria, confusion, right-sided deficits, and lower extremity weakness resolved. Patient endorsed vertigo brought about by standing.    pre-mRS: 1  LKN: 1/3 @2230  NIHSS: Initial 9 (R facial, dysarthria, R sensory, bilateral LE drift)    Not a tenecteplase candidate due to out of window, resolving symptoms.  Not a mechanical thrombectomy candidate due to no large vessel occlusion.    Impression: Altered mental status, dysarthria, episode of urinary incontinence, subtle right side deficits and generalized weakness. Differential diagnosis includes seizure vs toxic metabolic encephalopathy. Acute stroke/TIA is not suspected.    Recommendations:  [] TSH, T3/T4, Folate, B12, lactate, CPK, ammonia, syphilis screen, UA, Utox, EtOH level  [] vEEG  [] MRI brain without contrast   [] Lipid panel, HbA1c  [] Continue home Xarelto for secondary stroke prevention  [] Atorvastatin per ASCVD   [] BP goal normotension  [] Neurological checks q4h  [] Seizure, fall, aspiration precautions  [] PT/OT/S&S evaluation/DVT ppx per primary team    Discussed with stroke fellow Dr. Salgado under supervision of attending Dr. Ro Peck. Case and plan not final until Attending attestation Assessment: 59 RH M PMHx pre-DM, Afib on Xarelto, bariatric surgery 2015 presented as code stroke for altered mental status. Was in usual state on 1/2 at 2230, this morning his wife had difficulty arousing him, had episode of urinary incontinence in the bed. When he woke up, patient appeared altered and dysarthric, EMS called and seen as code stroke in ED. Initial VS in ED: /60 . Exam: Initial exam, patient AAOx3 but with confusion and unable to follow all simple commands. Also with severe dysarthria. Subtle R facial droop, subjective numbness RUE. Bilateral lower extremity weakness.   CT head demonstrated possible tiny acute SDH. Patient denies recent trauma, no evidence of trauma on exam. CTA/perfusion unremarkable.    Upon subsequent exam in ED, dysarthria, confusion, right-sided deficits, and lower extremity weakness resolved. Patient endorsed vertigo brought about by standing.    pre-mRS: 1  LKN: 1/3 @2230  NIHSS: Initial 9 (R facial, dysarthria, R sensory, bilateral LE drift)    Not a tenecteplase candidate due to out of window, resolving symptoms.  Not a mechanical thrombectomy candidate due to no large vessel occlusion.    Impression: Altered mental status, dysarthria, episode of urinary incontinence, subtle right side deficits and generalized weakness. Differential diagnosis includes seizure vs toxic metabolic encephalopathy. Acute stroke/TIA is not suspected.    Recommendations:  [] TSH, T3/T4, Folate, B12, lactate, CPK, ammonia, syphilis screen, UA, Utox, EtOH level  [] vEEG  [] MRI brain without contrast   [] Lipid panel, HbA1c  [] Continue home Xarelto for secondary stroke prevention when cleared fron NSGY standpoint  [] Atorvastatin per ASCVD   [] BP goal normotension  [] NSGY consult for possible SDH  [] Neurological checks q4h  [] Seizure, fall, aspiration precautions  [] PT/OT/S&S evaluation/DVT ppx per primary team    Discussed with stroke fellow Dr. Salgado under supervision of attending Dr. Ro Peck. Case and plan not final until Attending attestation Assessment: 59 RH M PMHx pre-DM, Afib on Xarelto, bariatric surgery 2015 presented as code stroke for altered mental status. Was in usual state on 1/2 at 2230, this morning his wife had difficulty arousing him, had episode of urinary incontinence in the bed. When he woke up, patient appeared altered and dysarthric, EMS called and seen as code stroke in ED. Initial VS in ED: /60 . Exam: Initial exam, patient AAOx3 but with confusion and unable to follow all simple commands. Also with severe dysarthria. Subtle R facial droop, subjective numbness RUE. Bilateral lower extremity weakness.   CT head demonstrated possible tiny acute SDH. Patient denies recent trauma, no evidence of trauma on exam. CTA/perfusion unremarkable.    Upon subsequent exam in ED, dysarthria, confusion, right-sided deficits, and lower extremity weakness resolved. Patient endorsed vertigo brought about by standing.    pre-mRS: 1  LKN: 1/3 @2230  NIHSS: Initial 9 (R facial, dysarthria, R sensory, bilateral LE drift)    Not a tenecteplase candidate due to out of window, resolving symptoms.  Not a mechanical thrombectomy candidate due to no large vessel occlusion.    Impression: Altered mental status, dysarthria, episode of urinary incontinence, subtle right side deficits and generalized weakness. Differential diagnosis includes seizure vs toxic metabolic encephalopathy. Acute stroke/TIA is not suspected.    Recommendations:  [] TSH, T3/T4, Folate, B12, lactate, CPK, ammonia, syphilis screen, UA, Utox, EtOH level  [] vEEG  [] MRI brain without contrast   [] Lipid panel, HbA1c  [] hold xarelto  [] Atorvastatin per ASCVD   [] BP goal normotension  [] NSGY consult for possible SDH  [] Neurological checks q4h  [] Seizure, fall, aspiration precautions  [] PT/OT/S&S evaluation/DVT ppx per primary team    Discussed with stroke fellow Dr. Salgado under supervision of attending Dr. Ro Peck. Case and plan not final until Attending attestation

## 2025-01-03 NOTE — SWALLOW BEDSIDE ASSESSMENT ADULT - ADDITIONAL RECOMMENDATIONS
1. This service to follow for diet tolerance as schedule permits. 2. Medical team advised to reconsult this service if patient is with a change in medical status or change in tolerance of recommended PO. 3. Monitor for any changes in neurological status that may impact PO intake. Of Note: During PO trials patient stated "I shouldn't have too much of this I may have surgery tonight" SLP spoke with RN prior to PO trials who indicated clearance for patient to be seen. SLP at this point reconfirmed with RN that patient able to have PO trials RN indicated not being notified of plan for surgery/ patient needing to be NPO. SLP informed ACP that PO trials were provided and at that time ACP did not indicate plan for surgery today.

## 2025-01-03 NOTE — H&P ADULT - NSHPPHYSICALEXAM_GEN_ALL_CORE
General: WN/WD NAD  PERRLA  Neurology: A&Ox3, nonfocal, DIAZ x 4  Respiratory: CTA B/L  CV: RRR, S1S2, no murmurs, rubs or gallops  Abdominal: Soft, NT, ND +BS, Last BM  Extremities: No edema, + peripheral pulses  Skin Normal

## 2025-01-03 NOTE — SPEECH LANGUAGE PATHOLOGY EVALUATION - SLP DIAGNOSIS
Patient presents with functional receptive and expressive language skills characterized by adequate auditory comprehension skills for simple directives, adequate naming skills with no anomia noted, adequate repetition skills (word/ sentence level) and fluent language output. Patient presents with functional motor speech skills characterized by adequate articulation, rate and prosody to effectively convey wants/ needs/ thoughts at the conversational level.

## 2025-01-03 NOTE — ED ADULT NURSE REASSESSMENT NOTE - NS ED NURSE REASSESS COMMENT FT1
I was called over by [facility] to help administer Balfaxar to the pt.  The drug and purpose was explained to the pt and his family.  Pt verbalized understanding.  This RN remained at beside during the whole medication delivery.  Pt neuro status remained unchanged.  Report was given to Abdelrahman FREDERICK

## 2025-01-03 NOTE — ED ADULT NURSE NOTE - NSFALLRISKINTERV_ED_ALL_ED

## 2025-01-03 NOTE — ED PROVIDER NOTE - PROGRESS NOTE DETAILS
SUSANNE Logan: CT imaging noted. NS consulted for small subdural hematoma. Pt complained of left sided chest pain as well, EKG non-ischemic, first trop 21, second trop pending. Admiting to tele, discussed with Jose Juan Wakefield

## 2025-01-03 NOTE — CONSULT NOTE ADULT - SUBJECTIVE AND OBJECTIVE BOX
NEUROSURGERY CONSULT    HPI: 59yM pmhx CVA (2010 w/ rt side weakness) DM, HTN, asthma, SHARMIN, AFIB (on xarelto, last dose yesterday) pw slurred speech, self-urination, confusion to ED. Last known normal last night 10:30pm. Denies trauma, pt is right handed. Platelets WNL. PTT 39.6, PT 17.5, INR 1.51. Neuro following suggesting to continue xarelto for secondary stroke prevention. They suspect seizure/toxic metabolic encephalopathy over acute stroke. Pt being admitted to telemetry.       RADIOLOGY:   < from: CT Brain Stroke Protocol (01.03.25 @ 08:10) >    ACC: [id number] EXAM:  CT BRAIN STROKE PROTOCOL   ORDERED BY: AARTI ENG     *** ADDENDUM # 1 ***    Tiny linear area of extra-axial high attenuation seen along the anterior   interhemispheric region. This best seen on series 303 image 48. The   possibility of a small acute subdural hematoma cannot be entirely   excluded. Please correlate clinically. This finding does measure   approximately 1.8 mm in widest diameter. This finding is new when   compared prior head CT performed on February 9, 2022    Findings discussed with neurology resident extension [phone] on July 3, 2025   at 8:29 AM  with read back.    --- End of Report ---    *** END OF ADDENDUM # 1 ***    < end of copied text >      MEDS:  acetaminophen     Tablet .. 650 milliGRAM(s) Oral every 6 hours PRN  dextrose 5%. 1000 milliLiter(s) IV Continuous <Continuous>  dextrose 5%. 1000 milliLiter(s) IV Continuous <Continuous>  dextrose 50% Injectable 25 Gram(s) IV Push once  dextrose 50% Injectable 12.5 Gram(s) IV Push once  dextrose 50% Injectable 25 Gram(s) IV Push once  dextrose Oral Gel 15 Gram(s) Oral once PRN  glucagon  Injectable 1 milliGRAM(s) IntraMuscular once  insulin lispro (ADMELOG) corrective regimen sliding scale   SubCutaneous three times a day before meals  insulin lispro (ADMELOG) corrective regimen sliding scale   SubCutaneous at bedtime      Vital Signs Last 24 Hrs  T(C): 37.1 (03 Jan 2025 08:57), Max: 37.2 (03 Jan 2025 07:47)  T(F): 98.8 (03 Jan 2025 08:57), Max: 98.9 (03 Jan 2025 07:47)  HR: 108 (03 Jan 2025 08:57) (108 - 110)  BP: 116/77 (03 Jan 2025 08:57) (106/60 - 116/77)  BP(mean): --  RR: 18 (03 Jan 2025 08:57) (18 - 18)  SpO2: 100% (03 Jan 2025 08:57) (99% - 100%)    Parameters below as of 03 Jan 2025 08:57  Patient On (Oxygen Delivery Method): room air        LABS:                        13.3   8.95  )-----------( 273      ( 03 Jan 2025 07:55 )             42.2     01-03    139  |  105  |  14  ----------------------------<  151[H]  4.1   |  23  |  0.59    Ca    8.6      03 Jan 2025 07:55    TPro  7.0  /  Alb  3.5  /  TBili  0.3  /  DBili  x   /  AST  22  /  ALT  17  /  AlkPhos  83  01-03    PT/INR - ( 03 Jan 2025 07:55 )   PT: 17.5 sec;   INR: 1.51 ratio         PTT - ( 03 Jan 2025 07:55 )  PTT:39.6 sec      PHYSICAL EXAM:  Aox3, follows commands, face sym, tongue midline  PERRL, EOMI  Lt shoulder weakness   Rt UE 4+/5, LT UE 4/5  Rt LE 4+/5, LT LE 4/5  Sensation dec LT side

## 2025-01-03 NOTE — OCCUPATIONAL THERAPY INITIAL EVALUATION ADULT - ADDITIONAL COMMENTS
Patient reports living in a private home with his wife with steps to manage. However, reports they are scheduled to move to an apartment next Saturday.

## 2025-01-03 NOTE — CONSULT NOTE ADULT - SUBJECTIVE AND OBJECTIVE BOX
C A R D I O L O G Y  *********************    DATE OF SERVICE: 01-03-25    HISTORY OF PRESENT ILLNESS: HPI: Pt is a 59-year-old male with a pmh of Afib on Xarelto,  CVA  in 2010,  HLD, asth,a presented with AMS. States last night was normal around 10:30. This AM reproted consudison and slurred speech. Has been compliant with Xarelto.  CT showing   Non-traumatic acute subdural hematoma given Kcentra for xarelto reversal non contrast CTH 4 hr and in AM 1/4. Denies any chest pain, SOB or orthpnea.      PAST MEDICAL & SURGICAL HISTORY:  H/O: Obesity s/p gastric sleeve in 2015, lost ~75 lbs  DM (diabetes mellitus) resolved with weight loss surgery  Hypertension on diet control  Asthma intubated x 2 in 1992, hospitalized in 2011, currently stable, last used rescue inhaler 7 months ago  SHARMIN (obstructive sleep apnea) resolved after weight loss surgery  Pilonidal cyst with abscess had surgery  Lower back pain  GERD (gastroesophageal reflux disease)  S/P tonsillectomy  S/P appendectomy  S/P hernia repair bilateral inguinal hernia repair  S/P laparoscopic sleeve gastrectomy 2015  S/P sinus surgery with septoplasty    MEDICATIONS:  MEDICATIONS  (STANDING):  dextrose 5%. 1000 milliLiter(s) (100 mL/Hr) IV Continuous <Continuous>  dextrose 5%. 1000 milliLiter(s) (50 mL/Hr) IV Continuous <Continuous>  dextrose 50% Injectable 25 Gram(s) IV Push once  dextrose 50% Injectable 12.5 Gram(s) IV Push once  dextrose 50% Injectable 25 Gram(s) IV Push once  glucagon  Injectable 1 milliGRAM(s) IntraMuscular once  insulin lispro (ADMELOG) corrective regimen sliding scale   SubCutaneous three times a day before meals  insulin lispro (ADMELOG) corrective regimen sliding scale   SubCutaneous at bedtime    Allergies: Avelox (Unknown)    FAMILY HISTORY:  Family history of diabetes mellitus (DM) mother - also CVA  Family history of cerebrovascular accident (CVA) in father  FH: leukemia brother    SOCIAL HISTORY:    [X ] Non-smoker  [ ] Smoker  [ ] Alcohol    FLU VACCINE THIS YEAR STARTS IN AUGUST:  [ ] Yes    [ ] No    IF OVER 65 HAVE YOU EVER HAD A PNA VACCINE:  [ ] Yes    [ ] No       [ ] N/A      REVIEW OF SYSTEMS:  [ ]chest pain  [  ]shortness of breath  [  ]palpitations  [  ]syncope  [ ]near syncope [ ]upper extremity weakness   [ ] lower extremity weakness  [  ]diplopia  [  ]altered mental status   [  ]fevers  [ ]chills [ ]nausea  [ ]vomiting  [  ]dysphagia    [ ]abdominal pain  [ ]melena  [ ]BRBPR    [  ]epistaxis  [  ]rash    [ ]lower extremity edema    [X] All others negative	  [ ] Unable to obtain    LABS:	 	    CARDIAC MARKERS:                        13.3   8.95  )-----------( 273      ( 03 Jan 2025 07:55 )             42.2     Hb Trend: 13.3<--    01-03    139  |  105  |  14  ----------------------------<  151[H]  4.1   |  23  |  0.59    Ca    8.6      03 Jan 2025 07:55  Mg     2.00     01-03    TPro  7.0  /  Alb  3.5  /  TBili  0.3  /  DBili  x   /  AST  22  /  ALT  17  /  AlkPhos  83  01-03  Creatinine Trend: 0.59<--  Coags:  PT/INR - ( 03 Jan 2025 07:55 )   PT: 17.5 sec;   INR: 1.51 ratio    PTT - ( 03 Jan 2025 07:55 )  PTT:39.6 sec  TSH: Thyroid Stimulating Hormone, Serum: 0.43 uIU/mL (01-03 @ 10:02)    PHYSICAL EXAM:  T(C): 37.1 (01-03-25 @ 12:00), Max: 37.2 (01-03-25 @ 07:47)  HR: 96 (01-03-25 @ 12:00) (96 - 110)  BP: 116/73 (01-03-25 @ 12:00) (106/60 - 116/77)  RR: 14 (01-03-25 @ 12:00) (14 - 18)  SpO2: 97% (01-03-25 @ 12:00) (97% - 100%)  Wt(kg): --     I&O's Summary      Gen: NAD  HEENT:  (-)icterus (-)pallor  CV: N S1 S2 1/6 AUDI (+)2 Pulses B/l  Resp:  Clear to auscultation B/L, normal effort  GI: (+) BS Soft, NT, ND  Lymph:  (-)Edema, (-)obvious lymphadenopathy  Skin: Warm to touch, Normal turgor  Psych: Appropriate mood and affect      TELEMETRY: 	NSR      ECG:  	Sinus      < from: Cardiac Catheterization (01.23.23 @ 12:45) >  LM   Left main artery: Angiography shows no disease.      LAD Left anterior descending artery: Angiography shows minor irregularities.    CX Circumflex: Angiography shows minor irregularities.      RCA Right coronary artery: Angiography shows minor irregularities.      < from: CT Brain Perfusion Maps Stroke (01.03.25 @ 08:11) >    IMPRESSION: Unremarkable CTA of neck and [place] of [place].    CT perfusion data as described above.        CT HEAD:  Tiny linear area of extra-axial high attenuation seen along the anterior   interhemispheric region. This best seen on series 303 image 48. The   possibility of a small acute subdural hematoma cannot be entirely   excluded. Please correlate clinically. This finding does measure   approximately 1.8 mm in widest diameter. This finding is new when   compared prior head CT performed on February 9, 2022            ASSESSMENT/PLAN: Pt is a 59-year-old male with a pmh of Afib on Xarelto,  CVA  in 2010,  HLD, asth,a presented with AMS. States last night was normal around 10:30. This AM reproted consudison and slurred speech. Has been compliant with Xarelto.  CT showing   Non-traumatic acute subdural hematoma given Kcentra for xarelto reversal non contrast CTH 4 hr and in AM 1/4. Denies any chest pain, SOB or orthpnea.      Afib   - [id number] of at least 2  - CT with possible acute subdural hematoma  - given Kcentra for xarelto reversalwith plans for  non contrast CTH 4 hr and in AM 1/4. Holding Xareltro  - defer to NSGY when Xarelto should be restarted, can hold for now prefer to minimize time to restart AC as has had previous CVA  - outpatient watchman eval  - c/w statin  -  f/u TTE      Yarelis Manzano MD  Pager: [phone]

## 2025-01-03 NOTE — ED ADULT TRIAGE NOTE - CHIEF COMPLAINT QUOTE
was woken up at 0630 by wife p/w slurred speech, incontinence, AAOx2- baseline is AAOx4, independently ambulatory- hx DMT2 HTN  *code stroke activated- pt taken directly to CT*

## 2025-01-04 LAB
ANION GAP SERPL CALC-SCNC: 11 MMOL/L — SIGNIFICANT CHANGE UP (ref 7–14)
ANISOCYTOSIS BLD QL: SLIGHT — SIGNIFICANT CHANGE UP
APTT BLD: 34.9 SEC — SIGNIFICANT CHANGE UP (ref 24.5–35.6)
BASOPHILS # BLD AUTO: 0.03 K/UL — SIGNIFICANT CHANGE UP (ref 0–0.2)
BASOPHILS NFR BLD AUTO: 0.9 % — SIGNIFICANT CHANGE UP (ref 0–2)
BUN SERPL-MCNC: 15 MG/DL — SIGNIFICANT CHANGE UP (ref 7–23)
CALCIUM SERPL-MCNC: 8 MG/DL — LOW (ref 8.4–10.5)
CHLORIDE SERPL-SCNC: 106 MMOL/L — SIGNIFICANT CHANGE UP (ref 98–107)
CO2 SERPL-SCNC: 24 MMOL/L — SIGNIFICANT CHANGE UP (ref 22–31)
CREAT SERPL-MCNC: 0.55 MG/DL — SIGNIFICANT CHANGE UP (ref 0.5–1.3)
EGFR: 113 ML/MIN/1.73M2 — SIGNIFICANT CHANGE UP
EOSINOPHIL # BLD AUTO: 0.07 K/UL — SIGNIFICANT CHANGE UP (ref 0–0.5)
EOSINOPHIL NFR BLD AUTO: 1.8 % — SIGNIFICANT CHANGE UP (ref 0–6)
GLUCOSE BLDC GLUCOMTR-MCNC: 102 MG/DL — HIGH (ref 70–99)
GLUCOSE BLDC GLUCOMTR-MCNC: 111 MG/DL — HIGH (ref 70–99)
GLUCOSE BLDC GLUCOMTR-MCNC: 161 MG/DL — HIGH (ref 70–99)
GLUCOSE BLDC GLUCOMTR-MCNC: 208 MG/DL — HIGH (ref 70–99)
GLUCOSE BLDC GLUCOMTR-MCNC: 97 MG/DL — SIGNIFICANT CHANGE UP (ref 70–99)
GLUCOSE SERPL-MCNC: 105 MG/DL — HIGH (ref 70–99)
HCT VFR BLD CALC: 38.7 % — LOW (ref 39–50)
HGB BLD-MCNC: 11.8 G/DL — LOW (ref 13–17)
IANC: 1.4 K/UL — LOW (ref 1.8–7.4)
INR BLD: 0.93 RATIO — SIGNIFICANT CHANGE UP (ref 0.85–1.16)
LYMPHOCYTES # BLD AUTO: 1.4 K/UL — SIGNIFICANT CHANGE UP (ref 1–3.3)
LYMPHOCYTES # BLD AUTO: 37.5 % — SIGNIFICANT CHANGE UP (ref 13–44)
MAGNESIUM SERPL-MCNC: 1.9 MG/DL — SIGNIFICANT CHANGE UP (ref 1.6–2.6)
MANUAL SMEAR VERIFICATION: SIGNIFICANT CHANGE UP
MCHC RBC-ENTMCNC: 22.9 PG — LOW (ref 27–34)
MCHC RBC-ENTMCNC: 30.5 G/DL — LOW (ref 32–36)
MCV RBC AUTO: 75.1 FL — LOW (ref 80–100)
MONOCYTES # BLD AUTO: 0.2 K/UL — SIGNIFICANT CHANGE UP (ref 0–0.9)
MONOCYTES NFR BLD AUTO: 5.3 % — SIGNIFICANT CHANGE UP (ref 2–14)
NEUTROPHILS # BLD AUTO: 1.97 K/UL — SIGNIFICANT CHANGE UP (ref 1.8–7.4)
NEUTROPHILS NFR BLD AUTO: 51.8 % — SIGNIFICANT CHANGE UP (ref 43–77)
NEUTS BAND # BLD: 0.9 % — SIGNIFICANT CHANGE UP (ref 0–6)
PHOSPHATE SERPL-MCNC: 2.8 MG/DL — SIGNIFICANT CHANGE UP (ref 2.5–4.5)
PLAT MORPH BLD: NORMAL — SIGNIFICANT CHANGE UP
PLATELET # BLD AUTO: 209 K/UL — SIGNIFICANT CHANGE UP (ref 150–400)
PLATELET COUNT - ESTIMATE: NORMAL — SIGNIFICANT CHANGE UP
POIKILOCYTOSIS BLD QL AUTO: SLIGHT — SIGNIFICANT CHANGE UP
POLYCHROMASIA BLD QL SMEAR: SLIGHT — SIGNIFICANT CHANGE UP
POTASSIUM SERPL-MCNC: 4.1 MMOL/L — SIGNIFICANT CHANGE UP (ref 3.5–5.3)
POTASSIUM SERPL-SCNC: 4.1 MMOL/L — SIGNIFICANT CHANGE UP (ref 3.5–5.3)
PROTHROM AB SERPL-ACNC: 11.1 SEC — SIGNIFICANT CHANGE UP (ref 9.9–13.4)
RBC # BLD: 5.15 M/UL — SIGNIFICANT CHANGE UP (ref 4.2–5.8)
RBC # FLD: 14.8 % — HIGH (ref 10.3–14.5)
RBC BLD AUTO: ABNORMAL
SMUDGE CELLS # BLD: PRESENT — SIGNIFICANT CHANGE UP
SODIUM SERPL-SCNC: 141 MMOL/L — SIGNIFICANT CHANGE UP (ref 135–145)
T PALLIDUM AB TITR SER: NEGATIVE — SIGNIFICANT CHANGE UP
VARIANT LYMPHS # BLD: 1.8 % — SIGNIFICANT CHANGE UP (ref 0–6)
WBC # BLD: 3.73 K/UL — LOW (ref 3.8–10.5)
WBC # FLD AUTO: 3.73 K/UL — LOW (ref 3.8–10.5)

## 2025-01-04 PROCEDURE — 70450 CT HEAD/BRAIN W/O DYE: CPT | Mod: 26

## 2025-01-04 RX ADMIN — SUCRALFATE 1 GRAM(S): 1 SUSPENSION ORAL at 17:59

## 2025-01-04 RX ADMIN — ATORVASTATIN CALCIUM 80 MILLIGRAM(S): 40 TABLET, FILM COATED ORAL at 21:43

## 2025-01-04 RX ADMIN — SUCRALFATE 1 GRAM(S): 1 SUSPENSION ORAL at 12:11

## 2025-01-04 RX ADMIN — TRAZODONE HYDROCHLORIDE 50 MILLIGRAM(S): 150 TABLET ORAL at 12:11

## 2025-01-04 NOTE — PROGRESS NOTE ADULT - SUBJECTIVE AND OBJECTIVE BOX
Patient is a 59y old  Male who presents with a chief complaint of AMS (04 Jan 2025 08:20)    Date of servie : 01-04-25 @ 10:13  INTERVAL HPI/OVERNIGHT EVENTS:  T(C): 36.8 (01-04-25 @ 08:00), Max: 37.1 (01-03-25 @ 11:45)  HR: 67 (01-04-25 @ 08:00) (65 - 96)  BP: 118/77 (01-04-25 @ 08:00) (103/80 - 118/77)  RR: 18 (01-04-25 @ 08:00) (14 - 19)  SpO2: 100% (01-04-25 @ 08:00) (97% - 100%)  Wt(kg): --  I&O's Summary    03 Jan 2025 07:01  -  04 Jan 2025 07:00  --------------------------------------------------------  IN: 100 mL / OUT: 300 mL / NET: -200 mL        LABS:                        11.8   3.73  )-----------( 209      ( 04 Jan 2025 07:15 )             38.7     01-04    141  |  106  |  15  ----------------------------<  105[H]  4.1   |  24  |  0.55    Ca    8.0[L]      04 Jan 2025 07:15  Phos  2.8     01-04  Mg     1.90     01-04    TPro  7.0  /  Alb  3.5  /  TBili  0.3  /  DBili  x   /  AST  22  /  ALT  17  /  AlkPhos  83  01-03    PT/INR - ( 03 Jan 2025 07:55 )   PT: 17.5 sec;   INR: 1.51 ratio         PTT - ( 03 Jan 2025 07:55 )  PTT:39.6 sec  Urinalysis Basic - ( 04 Jan 2025 07:15 )    Color: x / Appearance: x / SG: x / pH: x  Gluc: 105 mg/dL / Ketone: x  / Bili: x / Urobili: x   Blood: x / Protein: x / Nitrite: x   Leuk Esterase: x / RBC: x / WBC x   Sq Epi: x / Non Sq Epi: x / Bacteria: x      CAPILLARY BLOOD GLUCOSE      POCT Blood Glucose.: 111 mg/dL (04 Jan 2025 07:03)  POCT Blood Glucose.: 97 mg/dL (04 Jan 2025 01:07)  POCT Blood Glucose.: 93 mg/dL (03 Jan 2025 23:16)  POCT Blood Glucose.: 100 mg/dL (03 Jan 2025 20:32)  POCT Blood Glucose.: 135 mg/dL (03 Jan 2025 17:32)  POCT Blood Glucose.: 119 mg/dL (03 Jan 2025 13:23)        Urinalysis Basic - ( 04 Jan 2025 07:15 )    Color: x / Appearance: x / SG: x / pH: x  Gluc: 105 mg/dL / Ketone: x  / Bili: x / Urobili: x   Blood: x / Protein: x / Nitrite: x   Leuk Esterase: x / RBC: x / WBC x   Sq Epi: x / Non Sq Epi: x / Bacteria: x        MEDICATIONS  (STANDING):  atorvastatin 80 milliGRAM(s) Oral at bedtime  dextrose 5%. 1000 milliLiter(s) (100 mL/Hr) IV Continuous <Continuous>  dextrose 5%. 1000 milliLiter(s) (50 mL/Hr) IV Continuous <Continuous>  dextrose 50% Injectable 25 Gram(s) IV Push once  dextrose 50% Injectable 12.5 Gram(s) IV Push once  dextrose 50% Injectable 25 Gram(s) IV Push once  glucagon  Injectable 1 milliGRAM(s) IntraMuscular once  insulin lispro (ADMELOG) corrective regimen sliding scale   SubCutaneous three times a day before meals  insulin lispro (ADMELOG) corrective regimen sliding scale   SubCutaneous at bedtime  pantoprazole    Tablet 40 milliGRAM(s) Oral before breakfast  sodium chloride 0.9%. 1000 milliLiter(s) (75 mL/Hr) IV Continuous <Continuous>  sucralfate 1 Gram(s) Oral four times a day  traZODone 50 milliGRAM(s) Oral daily    MEDICATIONS  (PRN):  acetaminophen     Tablet .. 650 milliGRAM(s) Oral every 6 hours PRN Temp greater or equal to 38C (100.4F), Mild Pain (1 - 3), Moderate Pain (4 - 6)  albuterol    90 MICROgram(s) HFA Inhaler 2 Puff(s) Inhalation every 6 hours PRN Bronchospasm  dextrose Oral Gel 15 Gram(s) Oral once PRN Blood Glucose LESS THAN 70 milliGRAM(s)/deciliter          PHYSICAL EXAM:  GENERAL: NAD, well-groomed, well-developed  HEAD:  Atraumatic, Normocephalic  CHEST/LUNG: Clear to percussion bilaterally; No rales, rhonchi, wheezing, or rubs  HEART: Regular rate and rhythm; No murmurs, rubs, or gallops  ABDOMEN: Soft, Nontender, Nondistended; Bowel sounds present  EXTREMITIES:  2+ Peripheral Pulses, No clubbing, cyanosis, or edema  LYMPH: No lymphadenopathy noted  SKIN: No rashes or lesions    Care Discussed with Consultants/Other Providers [ ] YES  [ ] NO

## 2025-01-04 NOTE — PHYSICAL THERAPY INITIAL EVALUATION ADULT - GENERAL OBSERVATIONS, REHAB EVAL
Chart reviewed and cleared for PT by OTONIEL Sandoval. Pt received semi supine in bed in NAD, all lines intact + telemetry, IV, HR 79.

## 2025-01-04 NOTE — PATIENT PROFILE ADULT - HOW PATIENT ADDRESSED, PROFILE
Pt not seen by hospitalist today as she in procedure for laminectomy bilateral foraminotomy with removal of epidural mass  Dr Faustina Kelyl    chen garcia

## 2025-01-04 NOTE — PATIENT PROFILE ADULT - FALL HARM RISK - HARM RISK INTERVENTIONS

## 2025-01-04 NOTE — PROGRESS NOTE ADULT - SUBJECTIVE AND OBJECTIVE BOX
no chest pain or sob        acetaminophen     Tablet .. 650 milliGRAM(s) Oral every 6 hours PRN  albuterol    90 MICROgram(s) HFA Inhaler 2 Puff(s) Inhalation every 6 hours PRN  atorvastatin 80 milliGRAM(s) Oral at bedtime  dextrose 5%. 1000 milliLiter(s) IV Continuous <Continuous>  dextrose 5%. 1000 milliLiter(s) IV Continuous <Continuous>  dextrose 50% Injectable 25 Gram(s) IV Push once  dextrose 50% Injectable 12.5 Gram(s) IV Push once  dextrose 50% Injectable 25 Gram(s) IV Push once  dextrose Oral Gel 15 Gram(s) Oral once PRN  glucagon  Injectable 1 milliGRAM(s) IntraMuscular once  insulin lispro (ADMELOG) corrective regimen sliding scale   SubCutaneous three times a day before meals  insulin lispro (ADMELOG) corrective regimen sliding scale   SubCutaneous at bedtime  pantoprazole    Tablet 40 milliGRAM(s) Oral before breakfast  sodium chloride 0.9%. 1000 milliLiter(s) IV Continuous <Continuous>  sucralfate 1 Gram(s) Oral four times a day  traZODone 50 milliGRAM(s) Oral daily                            13.3   8.95  )-----------( 273      ( 03 Jan 2025 07:55 )             42.2       Hemoglobin: 13.3 g/dL (01-03 @ 07:55)      01-03    139  |  105  |  14  ----------------------------<  151[H]  4.1   |  23  |  0.59    Ca    8.6      03 Jan 2025 07:55  Mg     2.00     01-03    TPro  7.0  /  Alb  3.5  /  TBili  0.3  /  DBili  x   /  AST  22  /  ALT  17  /  AlkPhos  83  01-03    Creatinine Trend: 0.59<--    COAGS:           T(C): 36.7 (01-04-25 @ 05:00), Max: 37.1 (01-03-25 @ 08:57)  HR: 65 (01-04-25 @ 05:00) (65 - 108)  BP: 110/70 (01-04-25 @ 05:00) (103/80 - 116/77)  RR: 17 (01-04-25 @ 05:00) (14 - 19)  SpO2: 99% (01-04-25 @ 05:00) (97% - 100%)  Wt(kg): --    I&O's Summary    Gen: NAD  HEENT:  (-)icterus (-)pallor  CV: N S1 S2 1/6 AUDI (+)2 Pulses B/l  Resp:  Clear to auscultation B/L, normal effort  GI: (+) BS Soft, NT, ND  Lymph:  (-)Edema, (-)obvious lymphadenopathy  Skin: Warm to touch, Normal turgor  Psych: Appropriate mood and affect      TELEMETRY: 	NSR      ECG:  	Sinus      < from: Cardiac Catheterization (01.23.23 @ 12:45) >  LM   Left main artery: Angiography shows no disease.      LAD Left anterior descending artery: Angiography shows minor irregularities.    CX Circumflex: Angiography shows minor irregularities.      RCA Right coronary artery: Angiography shows minor irregularities.      < from: CT Brain Perfusion Maps Stroke (01.03.25 @ 08:11) >    IMPRESSION: Unremarkable CTA of neck and [place] of [place].    CT perfusion data as described above.        CT HEAD:  Tiny linear area of extra-axial high attenuation seen along the anterior   interhemispheric region. This best seen on series 303 image 48. The   possibility of a small acute subdural hematoma cannot be entirely   excluded. Please correlate clinically. This finding does measure   approximately 1.8 mm in widest diameter. This finding is new when   compared prior head CT performed on February 9, 2022        ECHO: < from: TTE W or WO Ultrasound Enhancing Agent (01.03.25 @ 14:00) >  CONCLUSIONS:      1. Left ventricular cavity is normal in size. Left ventricular systolic function is normal with an ejection fraction of 55 % by Jc's method of disks.   2. There is mild (grade 1) left ventricular diastolic dysfunction, with normal left ventricular filling pressure.   3. Normal right ventricular cavity size, with normal wall thickness, and normal right ventricular systolic function.   4. Left atrium is normal in size.   5. The right atrium is normal in size.   6.No significant valvular disease.   7. No pericardial effusion seen.   8. Agitated saline injection was negative for intracardiac shunt.    < end of copied text >      ASSESSMENT/PLAN: Pt is a 59-year-old male with a pmh of Afib on Xarelto,  CVA  in 2010,  HLD, asth,a presented with AMS. States last night was normal around 10:30. This AM reproted consudison and slurred speech. Has been compliant with Xarelto.  CT showing   Non-traumatic acute subdural hematoma given Kcentra for xarelto reversal non contrast CTH 4 hr and in AM 1/4. Denies any chest pain, SOB or orthpnea.      Afib   - [id number] of at least 2  - CT with possible acute subdural hematoma  - given Kcentra for xarelto reversalwith plans for  non contrast CTH 4 hr and in AM 1/4. Holding Xareltro  - defer to NSGY when Xarelto should be restarted, can hold for now prefer to minimize time to restart AC as has had previous CVA  - outpatient watchman eval  - c/w statin  - echo noted - NL lv / rv fx   CARDIOLOGY  *****************    DOS: 01/04/2025    no chest pain or sob        acetaminophen     Tablet .. 650 milliGRAM(s) Oral every 6 hours PRN  albuterol    90 MICROgram(s) HFA Inhaler 2 Puff(s) Inhalation every 6 hours PRN  atorvastatin 80 milliGRAM(s) Oral at bedtime  dextrose 5%. 1000 milliLiter(s) IV Continuous <Continuous>  dextrose 5%. 1000 milliLiter(s) IV Continuous <Continuous>  dextrose 50% Injectable 25 Gram(s) IV Push once  dextrose 50% Injectable 12.5 Gram(s) IV Push once  dextrose 50% Injectable 25 Gram(s) IV Push once  dextrose Oral Gel 15 Gram(s) Oral once PRN  glucagon  Injectable 1 milliGRAM(s) IntraMuscular once  insulin lispro (ADMELOG) corrective regimen sliding scale   SubCutaneous three times a day before meals  insulin lispro (ADMELOG) corrective regimen sliding scale   SubCutaneous at bedtime  pantoprazole    Tablet 40 milliGRAM(s) Oral before breakfast  sodium chloride 0.9%. 1000 milliLiter(s) IV Continuous <Continuous>  sucralfate 1 Gram(s) Oral four times a day  traZODone 50 milliGRAM(s) Oral daily                            13.3   8.95  )-----------( 273      ( 03 Jan 2025 07:55 )             42.2       Hemoglobin: 13.3 g/dL (01-03 @ 07:55)      01-03    139  |  105  |  14  ----------------------------<  151[H]  4.1   |  23  |  0.59    Ca    8.6      03 Jan 2025 07:55  Mg     2.00     01-03    TPro  7.0  /  Alb  3.5  /  TBili  0.3  /  DBili  x   /  AST  22  /  ALT  17  /  AlkPhos  83  01-03    Creatinine Trend: 0.59<--    COAGS:           T(C): 36.7 (01-04-25 @ 05:00), Max: 37.1 (01-03-25 @ 08:57)  HR: 65 (01-04-25 @ 05:00) (65 - 108)  BP: 110/70 (01-04-25 @ 05:00) (103/80 - 116/77)  RR: 17 (01-04-25 @ 05:00) (14 - 19)  SpO2: 99% (01-04-25 @ 05:00) (97% - 100%)  Wt(kg): --    I&O's Summary    Gen: NAD  HEENT:  (-)icterus (-)pallor  CV: N S1 S2 1/6 AUDI (+)2 Pulses B/l  Resp:  Clear to auscultation B/L, normal effort  GI: (+) BS Soft, NT, ND  Lymph:  (-)Edema, (-)obvious lymphadenopathy  Skin: Warm to touch, Normal turgor  Psych: Appropriate mood and affect      TELEMETRY: 	NSR      ECG:  	Sinus      < from: Cardiac Catheterization (01.23.23 @ 12:45) >  LM   Left main artery: Angiography shows no disease.      LAD Left anterior descending artery: Angiography shows minor irregularities.    CX Circumflex: Angiography shows minor irregularities.      RCA Right coronary artery: Angiography shows minor irregularities.      < from: CT Brain Perfusion Maps Stroke (01.03.25 @ 08:11) >    IMPRESSION: Unremarkable CTA of neck and [place] of [place].    CT perfusion data as described above.        CT HEAD:  Tiny linear area of extra-axial high attenuation seen along the anterior   interhemispheric region. This best seen on series 303 image 48. The   possibility of a small acute subdural hematoma cannot be entirely   excluded. Please correlate clinically. This finding does measure   approximately 1.8 mm in widest diameter. This finding is new when   compared prior head CT performed on February 9, 2022        ECHO: < from: TTE W or WO Ultrasound Enhancing Agent (01.03.25 @ 14:00) >  CONCLUSIONS:      1. Left ventricular cavity is normal in size. Left ventricular systolic function is normal with an ejection fraction of 55 % by Jc's method of disks.   2. There is mild (grade 1) left ventricular diastolic dysfunction, with normal left ventricular filling pressure.   3. Normal right ventricular cavity size, with normal wall thickness, and normal right ventricular systolic function.   4. Left atrium is normal in size.   5. The right atrium is normal in size.   6.No significant valvular disease.   7. No pericardial effusion seen.   8. Agitated saline injection was negative for intracardiac shunt.    < end of copied text >      ASSESSMENT/PLAN: Pt is a 59-year-old male with a pmh of Afib on Xarelto,  CVA  in 2010,  HLD, asth,a presented with AMS. States last night was normal around 10:30. This AM reproted consudison and slurred speech. Has been compliant with Xarelto.  CT showing   Non-traumatic acute subdural hematoma given Kcentra for xarelto reversal non contrast CTH 4 hr and in AM 1/4. Denies any chest pain, SOB or orthpnea.      Afib   - [id number] of at least 2  - CT with possible acute subdural hematoma  - given Kcentra for xarelto reversal. Xarelto on hold  - repeat CT head notes, plans for MR and repeat CT head  - defer to NSGY when Xarelto should be restarted, can hold for now prefer to minimize time to restart AC as has had previous CVA  - outpatient watchman eval  - c/w statin  - echo noted - NL lv / rv fx

## 2025-01-04 NOTE — PHYSICAL THERAPY INITIAL EVALUATION ADULT - PERTINENT HX OF CURRENT PROBLEM, REHAB EVAL
Pt is a 59 right handed Male with a past medical history of pre-DM, Afib on Xarelto, bariatric surgery 2015 presented as code stroke for altered mental status. Was in usual state on 1/2 at 2230, this morning his wife had difficulty arousing him, had episode of urinary incontinence in the bed. When he woke up, patient appeared altered and dysarthric, EMS called and seen as code stroke in ED. Initial VS in ED: /60 . Exam: Initial exam, patient AAOx3 but with confusion and unable to follow all simple commands. Also with severe dysarthria. Subtle R facial droop, subjective numbness RUE. Bilateral lower extremity weakness.   CT head demonstrated possible tiny acute SDH. Patient denies recent trauma, no evidence of trauma on exam. CTA/perfusion unremarkable.

## 2025-01-04 NOTE — PHYSICAL THERAPY INITIAL EVALUATION ADULT - ADDITIONAL COMMENTS
Pt lives with his wife in a house with 6 steps to enter. Pt did not use an assistive device and was independent with ADLs prior. Pt reports no falls in the past 6 months.  Pt left sitting at edge of the bed in NAD, all lines intact, call capellan in reach and OTONIEL Sandoval made aware.

## 2025-01-04 NOTE — PHYSICAL THERAPY INITIAL EVALUATION ADULT - PRECAUTIONS/LIMITATIONS, REHAB EVAL
Spoke with patient who states her cough and congestion improved after first round of antibiotic and steroid but then \"regressed.\" She states her allergy to Penicillin was just the yeast infection, but no difficulty breathing. She states she hasn't taken Penicillin since she was a child. She states JONATHAN Pierre prescribed Diflucan for her and she took that the same day she started the antibiotics. She would like to try Omnicef. Pharmacy loaded.     She states she is using the Albuterol inhaler \"a little bit.\" She states she has some Benzonatate for cough, but doesn't want to \"just mask the cough.\"    aspiration precautions/fall precautions/seizure precautions

## 2025-01-05 LAB
ANION GAP SERPL CALC-SCNC: 11 MMOL/L — SIGNIFICANT CHANGE UP (ref 7–14)
BASOPHILS # BLD AUTO: 0.03 K/UL — SIGNIFICANT CHANGE UP (ref 0–0.2)
BASOPHILS NFR BLD AUTO: 0.6 % — SIGNIFICANT CHANGE UP (ref 0–2)
BUN SERPL-MCNC: 12 MG/DL — SIGNIFICANT CHANGE UP (ref 7–23)
CALCIUM SERPL-MCNC: 8.4 MG/DL — SIGNIFICANT CHANGE UP (ref 8.4–10.5)
CHLORIDE SERPL-SCNC: 106 MMOL/L — SIGNIFICANT CHANGE UP (ref 98–107)
CO2 SERPL-SCNC: 22 MMOL/L — SIGNIFICANT CHANGE UP (ref 22–31)
CREAT SERPL-MCNC: 0.56 MG/DL — SIGNIFICANT CHANGE UP (ref 0.5–1.3)
EGFR: 112 ML/MIN/1.73M2 — SIGNIFICANT CHANGE UP
EOSINOPHIL # BLD AUTO: 0.11 K/UL — SIGNIFICANT CHANGE UP (ref 0–0.5)
EOSINOPHIL NFR BLD AUTO: 2.4 % — SIGNIFICANT CHANGE UP (ref 0–6)
GLUCOSE BLDC GLUCOMTR-MCNC: 102 MG/DL — HIGH (ref 70–99)
GLUCOSE BLDC GLUCOMTR-MCNC: 160 MG/DL — HIGH (ref 70–99)
GLUCOSE BLDC GLUCOMTR-MCNC: 166 MG/DL — HIGH (ref 70–99)
GLUCOSE BLDC GLUCOMTR-MCNC: 177 MG/DL — HIGH (ref 70–99)
GLUCOSE SERPL-MCNC: 95 MG/DL — SIGNIFICANT CHANGE UP (ref 70–99)
HCT VFR BLD CALC: 36 % — LOW (ref 39–50)
HGB BLD-MCNC: 11.2 G/DL — LOW (ref 13–17)
IANC: 2.31 K/UL — SIGNIFICANT CHANGE UP (ref 1.8–7.4)
IMM GRANULOCYTES NFR BLD AUTO: 0.2 % — SIGNIFICANT CHANGE UP (ref 0–0.9)
LYMPHOCYTES # BLD AUTO: 1.74 K/UL — SIGNIFICANT CHANGE UP (ref 1–3.3)
LYMPHOCYTES # BLD AUTO: 37.4 % — SIGNIFICANT CHANGE UP (ref 13–44)
MAGNESIUM SERPL-MCNC: 1.9 MG/DL — SIGNIFICANT CHANGE UP (ref 1.6–2.6)
MCHC RBC-ENTMCNC: 23 PG — LOW (ref 27–34)
MCHC RBC-ENTMCNC: 31.1 G/DL — LOW (ref 32–36)
MCV RBC AUTO: 73.9 FL — LOW (ref 80–100)
MONOCYTES # BLD AUTO: 0.45 K/UL — SIGNIFICANT CHANGE UP (ref 0–0.9)
MONOCYTES NFR BLD AUTO: 9.7 % — SIGNIFICANT CHANGE UP (ref 2–14)
NEUTROPHILS # BLD AUTO: 2.31 K/UL — SIGNIFICANT CHANGE UP (ref 1.8–7.4)
NEUTROPHILS NFR BLD AUTO: 49.7 % — SIGNIFICANT CHANGE UP (ref 43–77)
NRBC # BLD: 0 /100 WBCS — SIGNIFICANT CHANGE UP (ref 0–0)
NRBC # FLD: 0 K/UL — SIGNIFICANT CHANGE UP (ref 0–0)
PHOSPHATE SERPL-MCNC: 3.3 MG/DL — SIGNIFICANT CHANGE UP (ref 2.5–4.5)
PLATELET # BLD AUTO: 229 K/UL — SIGNIFICANT CHANGE UP (ref 150–400)
POTASSIUM SERPL-MCNC: 4 MMOL/L — SIGNIFICANT CHANGE UP (ref 3.5–5.3)
POTASSIUM SERPL-SCNC: 4 MMOL/L — SIGNIFICANT CHANGE UP (ref 3.5–5.3)
RBC # BLD: 4.87 M/UL — SIGNIFICANT CHANGE UP (ref 4.2–5.8)
RBC # FLD: 14.4 % — SIGNIFICANT CHANGE UP (ref 10.3–14.5)
SODIUM SERPL-SCNC: 139 MMOL/L — SIGNIFICANT CHANGE UP (ref 135–145)
WBC # BLD: 4.65 K/UL — SIGNIFICANT CHANGE UP (ref 3.8–10.5)
WBC # FLD AUTO: 4.65 K/UL — SIGNIFICANT CHANGE UP (ref 3.8–10.5)

## 2025-01-05 RX ORDER — RIVAROXABAN 2.5 MG/1
20 TABLET, FILM COATED ORAL
Refills: 0 | Status: DISCONTINUED | OUTPATIENT
Start: 2025-01-05 | End: 2025-01-07

## 2025-01-05 RX ADMIN — SUCRALFATE 1 GRAM(S): 1 SUSPENSION ORAL at 01:19

## 2025-01-05 RX ADMIN — ATORVASTATIN CALCIUM 80 MILLIGRAM(S): 40 TABLET, FILM COATED ORAL at 22:32

## 2025-01-05 RX ADMIN — ACETAMINOPHEN 650 MILLIGRAM(S): 80 SOLUTION/ DROPS ORAL at 14:16

## 2025-01-05 RX ADMIN — SUCRALFATE 1 GRAM(S): 1 SUSPENSION ORAL at 17:23

## 2025-01-05 RX ADMIN — SUCRALFATE 1 GRAM(S): 1 SUSPENSION ORAL at 06:39

## 2025-01-05 RX ADMIN — SUCRALFATE 1 GRAM(S): 1 SUSPENSION ORAL at 22:32

## 2025-01-05 RX ADMIN — TRAZODONE HYDROCHLORIDE 50 MILLIGRAM(S): 150 TABLET ORAL at 12:14

## 2025-01-05 RX ADMIN — Medication 1: at 08:35

## 2025-01-05 RX ADMIN — ACETAMINOPHEN 650 MILLIGRAM(S): 80 SOLUTION/ DROPS ORAL at 15:15

## 2025-01-05 RX ADMIN — PANTOPRAZOLE 40 MILLIGRAM(S): 40 TABLET, DELAYED RELEASE ORAL at 06:38

## 2025-01-05 RX ADMIN — RIVAROXABAN 20 MILLIGRAM(S): 2.5 TABLET, FILM COATED ORAL at 18:03

## 2025-01-05 RX ADMIN — Medication 1: at 17:23

## 2025-01-05 RX ADMIN — SUCRALFATE 1 GRAM(S): 1 SUSPENSION ORAL at 12:14

## 2025-01-05 NOTE — PROGRESS NOTE ADULT - SUBJECTIVE AND OBJECTIVE BOX
Patient is a 59y old  Male who presents with a chief complaint of AMS (05 Jan 2025 09:57)    Date of servie : 01-05-25 @ 15:16  INTERVAL HPI/OVERNIGHT EVENTS:  T(C): 36.8 (01-05-25 @ 12:00), Max: 36.8 (01-05-25 @ 12:00)  HR: 70 (01-05-25 @ 12:00) (62 - 84)  BP: 113/65 (01-05-25 @ 12:00) (110/78 - 120/67)  RR: 14 (01-05-25 @ 12:00) (14 - 17)  SpO2: 99% (01-05-25 @ 12:00) (98% - 100%)  Wt(kg): --  I&O's Summary      LABS:                        11.2   4.65  )-----------( 229      ( 05 Jan 2025 05:02 )             36.0     01-05    139  |  106  |  12  ----------------------------<  95  4.0   |  22  |  0.56    Ca    8.4      05 Jan 2025 05:02  Phos  3.3     01-05  Mg     1.90     01-05      PT/INR - ( 04 Jan 2025 15:40 )   PT: 11.1 sec;   INR: 0.93 ratio         PTT - ( 04 Jan 2025 15:40 )  PTT:34.9 sec  Urinalysis Basic - ( 05 Jan 2025 05:02 )    Color: x / Appearance: x / SG: x / pH: x  Gluc: 95 mg/dL / Ketone: x  / Bili: x / Urobili: x   Blood: x / Protein: x / Nitrite: x   Leuk Esterase: x / RBC: x / WBC x   Sq Epi: x / Non Sq Epi: x / Bacteria: x      CAPILLARY BLOOD GLUCOSE      POCT Blood Glucose.: 102 mg/dL (05 Jan 2025 12:06)  POCT Blood Glucose.: 166 mg/dL (05 Jan 2025 08:30)  POCT Blood Glucose.: 208 mg/dL (04 Jan 2025 21:40)  POCT Blood Glucose.: 161 mg/dL (04 Jan 2025 17:53)        Urinalysis Basic - ( 05 Jan 2025 05:02 )    Color: x / Appearance: x / SG: x / pH: x  Gluc: 95 mg/dL / Ketone: x  / Bili: x / Urobili: x   Blood: x / Protein: x / Nitrite: x   Leuk Esterase: x / RBC: x / WBC x   Sq Epi: x / Non Sq Epi: x / Bacteria: x        MEDICATIONS  (STANDING):  atorvastatin 80 milliGRAM(s) Oral at bedtime  dextrose 5%. 1000 milliLiter(s) (100 mL/Hr) IV Continuous <Continuous>  dextrose 5%. 1000 milliLiter(s) (50 mL/Hr) IV Continuous <Continuous>  dextrose 50% Injectable 25 Gram(s) IV Push once  dextrose 50% Injectable 12.5 Gram(s) IV Push once  dextrose 50% Injectable 25 Gram(s) IV Push once  glucagon  Injectable 1 milliGRAM(s) IntraMuscular once  insulin lispro (ADMELOG) corrective regimen sliding scale   SubCutaneous three times a day before meals  insulin lispro (ADMELOG) corrective regimen sliding scale   SubCutaneous at bedtime  pantoprazole    Tablet 40 milliGRAM(s) Oral before breakfast  rivaroxaban 20 milliGRAM(s) Oral with dinner  sucralfate 1 Gram(s) Oral four times a day  traZODone 50 milliGRAM(s) Oral daily    MEDICATIONS  (PRN):  acetaminophen     Tablet .. 650 milliGRAM(s) Oral every 6 hours PRN Temp greater or equal to 38C (100.4F), Mild Pain (1 - 3), Moderate Pain (4 - 6)  albuterol    90 MICROgram(s) HFA Inhaler 2 Puff(s) Inhalation every 6 hours PRN Bronchospasm  dextrose Oral Gel 15 Gram(s) Oral once PRN Blood Glucose LESS THAN 70 milliGRAM(s)/deciliter          PHYSICAL EXAM:  GENERAL: NAD, well-groomed, well-developed  HEAD:  Atraumatic, Normocephalic  CHEST/LUNG: Clear to percussion bilaterally; No rales, rhonchi, wheezing, or rubs  HEART: Regular rate and rhythm; No murmurs, rubs, or gallops  ABDOMEN: Soft, Nontender, Nondistended; Bowel sounds present  EXTREMITIES:  edema +    Care Discussed with Consultants/Other Providers [x ] YES  [ ] NO

## 2025-01-05 NOTE — PROGRESS NOTE ADULT - SUBJECTIVE AND OBJECTIVE BOX
CARDIOLOGY  *****************    DOS: 01/05/2025      no complaints        acetaminophen     Tablet .. 650 milliGRAM(s) Oral every 6 hours PRN  albuterol    90 MICROgram(s) HFA Inhaler 2 Puff(s) Inhalation every 6 hours PRN  atorvastatin 80 milliGRAM(s) Oral at bedtime  dextrose 5%. 1000 milliLiter(s) IV Continuous <Continuous>  dextrose 5%. 1000 milliLiter(s) IV Continuous <Continuous>  dextrose 50% Injectable 25 Gram(s) IV Push once  dextrose 50% Injectable 12.5 Gram(s) IV Push once  dextrose 50% Injectable 25 Gram(s) IV Push once  dextrose Oral Gel 15 Gram(s) Oral once PRN  glucagon  Injectable 1 milliGRAM(s) IntraMuscular once  insulin lispro (ADMELOG) corrective regimen sliding scale   SubCutaneous three times a day before meals  insulin lispro (ADMELOG) corrective regimen sliding scale   SubCutaneous at bedtime  pantoprazole    Tablet 40 milliGRAM(s) Oral before breakfast  sucralfate 1 Gram(s) Oral four times a day  traZODone 50 milliGRAM(s) Oral daily                            11.2   4.65  )-----------( 229      ( 05 Jan 2025 05:02 )             36.0       Hemoglobin: 11.2 g/dL (01-05 @ 05:02)  Hemoglobin: 11.8 g/dL (01-04 @ 07:15)  Hemoglobin: 13.3 g/dL (01-03 @ 07:55)      01-05    139  |  106  |  12  ----------------------------<  95  4.0   |  22  |  0.56    Ca    8.4      05 Jan 2025 05:02  Phos  3.3     01-05  Mg     1.90     01-05      Creatinine Trend: 0.56<--, 0.55<--, 0.59<--    COAGS: PT/INR - ( 04 Jan 2025 15:40 )   PT: 11.1 sec;   INR: 0.93 ratio         PTT - ( 04 Jan 2025 15:40 )  PTT:34.9 sec          T(C): 36.7 (01-05-25 @ 08:00), Max: 36.7 (01-05-25 @ 08:00)  HR: 62 (01-05-25 @ 08:00) (62 - 84)  BP: 113/67 (01-05-25 @ 08:00) (106/72 - 120/67)  RR: 17 (01-05-25 @ 08:00) (15 - 18)  SpO2: 100% (01-05-25 @ 08:00) (98% - 100%)  Wt(kg): --    I&O's Summary    Gen: NAD  HEENT:  (-)icterus (-)pallor  CV: N S1 S2 1/6 AUDI (+)2 Pulses B/l  Resp:  Clear to auscultation B/L, normal effort  GI: (+) BS Soft, NT, ND  Lymph:  (-)Edema, (-)obvious lymphadenopathy  Skin: Warm to touch, Normal turgor  Psych: Appropriate mood and affect      TELEMETRY: 	NSR      ECG:  	Sinus      < from: Cardiac Catheterization (01.23.23 @ 12:45) >  LM   Left main artery: Angiography shows no disease.      LAD Left anterior descending artery: Angiography shows minor irregularities.    CX Circumflex: Angiography shows minor irregularities.      RCA Right coronary artery: Angiography shows minor irregularities.      < from: CT Brain Perfusion Maps Stroke (01.03.25 @ 08:11) >    IMPRESSION: Unremarkable CTA of neck and [place] of [place].    CT perfusion data as described above.        CT HEAD:  Tiny linear area of extra-axial high attenuation seen along the anterior   interhemispheric region. This best seen on series 303 image 48. The   possibility of a small acute subdural hematoma cannot be entirely   excluded. Please correlate clinically. This finding does measure   approximately 1.8 mm in widest diameter. This finding is new when   compared prior head CT performed on February 9, 2022        ECHO: < from: TTE W or WO Ultrasound Enhancing Agent (01.03.25 @ 14:00) >  CONCLUSIONS:      1. Left ventricular cavity is normal in size. Left ventricular systolic function is normal with an ejection fraction of 55 % by Jc's method of disks.   2. There is mild (grade 1) left ventricular diastolic dysfunction, with normal left ventricular filling pressure.   3. Normal right ventricular cavity size, with normal wall thickness, and normal right ventricular systolic function.   4. Left atrium is normal in size.   5. The right atrium is normal in size.   6.No significant valvular disease.   7. No pericardial effusion seen.   8. Agitated saline injection was negative for intracardiac shunt.    < end of copied text >      ASSESSMENT/PLAN: Pt is a 59-year-old male with a pmh of Afib on Xarelto,  CVA  in 2010,  HLD, asth,a presented with AMS. States last night was normal around 10:30. This AM reproted consudison and slurred speech. Has been compliant with Xarelto.  CT showing   Non-traumatic acute subdural hematoma given Kcentra for xarelto reversal non contrast CTH 4 hr and in AM 1/4. Denies any chest pain, SOB or orthpnea.      Afib   - [id number] of at least 2  - CT with possible acute subdural hematoma  - given Kcentra for xarelto reversal. Xarelto on hold  - repeat CT head notes, plans for MR and repeat CT head  - defer to NSGY when Xarelto should be restarted, can hold for now prefer to minimize time to restart AC as has had previous CVA  - outpatient watchman eval  - c/w statin  - echo noted - NL lv / rv fx   CARDIOLOGY  *****************    DOS: 01/05/2025      no complaints        acetaminophen     Tablet .. 650 milliGRAM(s) Oral every 6 hours PRN  albuterol    90 MICROgram(s) HFA Inhaler 2 Puff(s) Inhalation every 6 hours PRN  atorvastatin 80 milliGRAM(s) Oral at bedtime  dextrose 5%. 1000 milliLiter(s) IV Continuous <Continuous>  dextrose 5%. 1000 milliLiter(s) IV Continuous <Continuous>  dextrose 50% Injectable 25 Gram(s) IV Push once  dextrose 50% Injectable 12.5 Gram(s) IV Push once  dextrose 50% Injectable 25 Gram(s) IV Push once  dextrose Oral Gel 15 Gram(s) Oral once PRN  glucagon  Injectable 1 milliGRAM(s) IntraMuscular once  insulin lispro (ADMELOG) corrective regimen sliding scale   SubCutaneous three times a day before meals  insulin lispro (ADMELOG) corrective regimen sliding scale   SubCutaneous at bedtime  pantoprazole    Tablet 40 milliGRAM(s) Oral before breakfast  sucralfate 1 Gram(s) Oral four times a day  traZODone 50 milliGRAM(s) Oral daily                            11.2   4.65  )-----------( 229      ( 05 Jan 2025 05:02 )             36.0       Hemoglobin: 11.2 g/dL (01-05 @ 05:02)  Hemoglobin: 11.8 g/dL (01-04 @ 07:15)  Hemoglobin: 13.3 g/dL (01-03 @ 07:55)      01-05    139  |  106  |  12  ----------------------------<  95  4.0   |  22  |  0.56    Ca    8.4      05 Jan 2025 05:02  Phos  3.3     01-05  Mg     1.90     01-05      Creatinine Trend: 0.56<--, 0.55<--, 0.59<--    COAGS: PT/INR - ( 04 Jan 2025 15:40 )   PT: 11.1 sec;   INR: 0.93 ratio         PTT - ( 04 Jan 2025 15:40 )  PTT:34.9 sec          T(C): 36.7 (01-05-25 @ 08:00), Max: 36.7 (01-05-25 @ 08:00)  HR: 62 (01-05-25 @ 08:00) (62 - 84)  BP: 113/67 (01-05-25 @ 08:00) (106/72 - 120/67)  RR: 17 (01-05-25 @ 08:00) (15 - 18)  SpO2: 100% (01-05-25 @ 08:00) (98% - 100%)  Wt(kg): --    I&O's Summary    Gen: NAD  HEENT:  (-)icterus (-)pallor  CV: N S1 S2 1/6 AUDI (+)2 Pulses B/l  Resp:  Clear to auscultation B/L, normal effort  GI: (+) BS Soft, NT, ND  Lymph:  (-)Edema, (-)obvious lymphadenopathy  Skin: Warm to touch, Normal turgor  Psych: Appropriate mood and affect      TELEMETRY: 	NSR      ECG:  	Sinus      < from: Cardiac Catheterization (01.23.23 @ 12:45) >  LM   Left main artery: Angiography shows no disease.      LAD Left anterior descending artery: Angiography shows minor irregularities.    CX Circumflex: Angiography shows minor irregularities.      RCA Right coronary artery: Angiography shows minor irregularities.      < from: CT Brain Perfusion Maps Stroke (01.03.25 @ 08:11) >    IMPRESSION: Unremarkable CTA of neck and [place] of [place].    CT perfusion data as described above.        CT HEAD:  Tiny linear area of extra-axial high attenuation seen along the anterior   interhemispheric region. This best seen on series 303 image 48. The   possibility of a small acute subdural hematoma cannot be entirely   excluded. Please correlate clinically. This finding does measure   approximately 1.8 mm in widest diameter. This finding is new when   compared prior head CT performed on February 9, 2022        ECHO: < from: TTE W or WO Ultrasound Enhancing Agent (01.03.25 @ 14:00) >  CONCLUSIONS:      1. Left ventricular cavity is normal in size. Left ventricular systolic function is normal with an ejection fraction of 55 % by Jc's method of disks.   2. There is mild (grade 1) left ventricular diastolic dysfunction, with normal left ventricular filling pressure.   3. Normal right ventricular cavity size, with normal wall thickness, and normal right ventricular systolic function.   4. Left atrium is normal in size.   5. The right atrium is normal in size.   6.No significant valvular disease.   7. No pericardial effusion seen.   8. Agitated saline injection was negative for intracardiac shunt.    < end of copied text >      ASSESSMENT/PLAN: Pt is a 59-year-old male with a pmh of Afib on Xarelto,  CVA  in 2010,  HLD, asth,a presented with AMS. States last night was normal around 10:30. This AM reproted consudison and slurred speech. Has been compliant with Xarelto.  CT showing   Non-traumatic acute subdural hematoma given Kcentra for xarelto reversal non contrast CTH 4 hr and in AM 1/4. Denies any chest pain, SOB or orthpnea.      Afib   - [id number] of at least 2  - CT with possible acute subdural hematoma  - given Kcentra for xarelto reversal. Xarelto on hold  - repeat CT head notes, plans for MR and repeat CT head  - defer to NSGY when Xarelto should be restarted  - c/w statin  - echo noted - NL lv / rv fx

## 2025-01-06 LAB
ANION GAP SERPL CALC-SCNC: 11 MMOL/L — SIGNIFICANT CHANGE UP (ref 7–14)
BASOPHILS # BLD AUTO: 0.02 K/UL — SIGNIFICANT CHANGE UP (ref 0–0.2)
BASOPHILS NFR BLD AUTO: 0.5 % — SIGNIFICANT CHANGE UP (ref 0–2)
BUN SERPL-MCNC: 13 MG/DL — SIGNIFICANT CHANGE UP (ref 7–23)
CALCIUM SERPL-MCNC: 8.2 MG/DL — LOW (ref 8.4–10.5)
CHLORIDE SERPL-SCNC: 102 MMOL/L — SIGNIFICANT CHANGE UP (ref 98–107)
CO2 SERPL-SCNC: 23 MMOL/L — SIGNIFICANT CHANGE UP (ref 22–31)
CREAT SERPL-MCNC: 0.56 MG/DL — SIGNIFICANT CHANGE UP (ref 0.5–1.3)
EGFR: 112 ML/MIN/1.73M2 — SIGNIFICANT CHANGE UP
EOSINOPHIL # BLD AUTO: 0.1 K/UL — SIGNIFICANT CHANGE UP (ref 0–0.5)
EOSINOPHIL NFR BLD AUTO: 2.3 % — SIGNIFICANT CHANGE UP (ref 0–6)
GLUCOSE BLDC GLUCOMTR-MCNC: 110 MG/DL — HIGH (ref 70–99)
GLUCOSE BLDC GLUCOMTR-MCNC: 120 MG/DL — HIGH (ref 70–99)
GLUCOSE BLDC GLUCOMTR-MCNC: 136 MG/DL — HIGH (ref 70–99)
GLUCOSE BLDC GLUCOMTR-MCNC: 194 MG/DL — HIGH (ref 70–99)
GLUCOSE SERPL-MCNC: 110 MG/DL — HIGH (ref 70–99)
HCT VFR BLD CALC: 36.3 % — LOW (ref 39–50)
HGB BLD-MCNC: 11.4 G/DL — LOW (ref 13–17)
IANC: 2.07 K/UL — SIGNIFICANT CHANGE UP (ref 1.8–7.4)
IMM GRANULOCYTES NFR BLD AUTO: 0.2 % — SIGNIFICANT CHANGE UP (ref 0–0.9)
LYMPHOCYTES # BLD AUTO: 1.69 K/UL — SIGNIFICANT CHANGE UP (ref 1–3.3)
LYMPHOCYTES # BLD AUTO: 39.5 % — SIGNIFICANT CHANGE UP (ref 13–44)
MAGNESIUM SERPL-MCNC: 1.9 MG/DL — SIGNIFICANT CHANGE UP (ref 1.6–2.6)
MCHC RBC-ENTMCNC: 23.1 PG — LOW (ref 27–34)
MCHC RBC-ENTMCNC: 31.4 G/DL — LOW (ref 32–36)
MCV RBC AUTO: 73.6 FL — LOW (ref 80–100)
MONOCYTES # BLD AUTO: 0.39 K/UL — SIGNIFICANT CHANGE UP (ref 0–0.9)
MONOCYTES NFR BLD AUTO: 9.1 % — SIGNIFICANT CHANGE UP (ref 2–14)
NEUTROPHILS # BLD AUTO: 2.07 K/UL — SIGNIFICANT CHANGE UP (ref 1.8–7.4)
NEUTROPHILS NFR BLD AUTO: 48.4 % — SIGNIFICANT CHANGE UP (ref 43–77)
NRBC # BLD: 0 /100 WBCS — SIGNIFICANT CHANGE UP (ref 0–0)
NRBC # FLD: 0 K/UL — SIGNIFICANT CHANGE UP (ref 0–0)
PHOSPHATE SERPL-MCNC: 3.4 MG/DL — SIGNIFICANT CHANGE UP (ref 2.5–4.5)
PLATELET # BLD AUTO: 201 K/UL — SIGNIFICANT CHANGE UP (ref 150–400)
POTASSIUM SERPL-MCNC: 3.7 MMOL/L — SIGNIFICANT CHANGE UP (ref 3.5–5.3)
POTASSIUM SERPL-SCNC: 3.7 MMOL/L — SIGNIFICANT CHANGE UP (ref 3.5–5.3)
RBC # BLD: 4.93 M/UL — SIGNIFICANT CHANGE UP (ref 4.2–5.8)
RBC # FLD: 14.3 % — SIGNIFICANT CHANGE UP (ref 10.3–14.5)
SODIUM SERPL-SCNC: 136 MMOL/L — SIGNIFICANT CHANGE UP (ref 135–145)
WBC # BLD: 4.28 K/UL — SIGNIFICANT CHANGE UP (ref 3.8–10.5)
WBC # FLD AUTO: 4.28 K/UL — SIGNIFICANT CHANGE UP (ref 3.8–10.5)

## 2025-01-06 PROCEDURE — 95720 EEG PHY/QHP EA INCR W/VEEG: CPT

## 2025-01-06 RX ORDER — DILTIAZEM HYDROCHLORIDE 300 MG/1
1 CAPSULE, COATED, EXTENDED RELEASE ORAL
Refills: 0 | DISCHARGE

## 2025-01-06 RX ORDER — LIPASE/PROTEASE/AMYLASE 8K-30K-30K
2 TABLET ORAL
Refills: 0 | Status: DISCONTINUED | OUTPATIENT
Start: 2025-01-06 | End: 2025-01-07

## 2025-01-06 RX ORDER — POTASSIUM CHLORIDE 600 MG/1
40 TABLET, FILM COATED, EXTENDED RELEASE ORAL ONCE
Refills: 0 | Status: DISCONTINUED | OUTPATIENT
Start: 2025-01-06 | End: 2025-01-07

## 2025-01-06 RX ORDER — MECLIZINE HYDROCHLORIDE 25 MG/1
1 TABLET ORAL
Refills: 0 | DISCHARGE

## 2025-01-06 RX ORDER — ROSUVASTATIN 40 MG/1
1 TABLET, FILM COATED ORAL
Refills: 0 | DISCHARGE

## 2025-01-06 RX ORDER — MECLIZINE HYDROCHLORIDE 25 MG/1
12.5 TABLET ORAL
Refills: 0 | Status: DISCONTINUED | OUTPATIENT
Start: 2025-01-06 | End: 2025-01-07

## 2025-01-06 RX ORDER — DILTIAZEM HYDROCHLORIDE 300 MG/1
30 CAPSULE, COATED, EXTENDED RELEASE ORAL
Refills: 0 | Status: DISCONTINUED | OUTPATIENT
Start: 2025-01-06 | End: 2025-01-07

## 2025-01-06 RX ORDER — LIPASE/PROTEASE/AMYLASE 8K-30K-30K
2 TABLET ORAL
Refills: 0 | DISCHARGE

## 2025-01-06 RX ADMIN — SUCRALFATE 1 GRAM(S): 1 SUSPENSION ORAL at 11:35

## 2025-01-06 RX ADMIN — SUCRALFATE 1 GRAM(S): 1 SUSPENSION ORAL at 05:10

## 2025-01-06 RX ADMIN — TRAZODONE HYDROCHLORIDE 50 MILLIGRAM(S): 150 TABLET ORAL at 11:35

## 2025-01-06 RX ADMIN — Medication 1: at 17:38

## 2025-01-06 RX ADMIN — PANTOPRAZOLE 40 MILLIGRAM(S): 40 TABLET, DELAYED RELEASE ORAL at 05:09

## 2025-01-06 RX ADMIN — ATORVASTATIN CALCIUM 80 MILLIGRAM(S): 40 TABLET, FILM COATED ORAL at 21:42

## 2025-01-06 RX ADMIN — Medication 2 CAPSULE(S): at 19:16

## 2025-01-06 RX ADMIN — RIVAROXABAN 20 MILLIGRAM(S): 2.5 TABLET, FILM COATED ORAL at 17:38

## 2025-01-06 NOTE — PROGRESS NOTE ADULT - NS ATTEND AMEND GEN_ALL_CORE FT
Patient seen and examined. Agree with plan as detailed in PA/NP Note.     Ct noted, no hemorrhage restart AC    Yarelis Manzano MD  Pager: [phone]
Patient seen and examined. Agree with plan as detailed in PA/NP Note.     AC restarted, awaiting MRI    Yarelis Manzano MD  Pager: [phone]
Patient seen and examined. Agree with plan as detailed in PA/NP Note.     Awaiting CTH and MR  Outpatient WATCHMAN soledad Manzano MD  Pager: [phone]

## 2025-01-06 NOTE — PROGRESS NOTE ADULT - SUBJECTIVE AND OBJECTIVE BOX
Patient is a 59y old  Male who presents with a chief complaint of AMS (06 Jan 2025 11:46)    Date of servie : 01-06-25 @ 14:08  INTERVAL HPI/OVERNIGHT EVENTS:  T(C): 36.4 (01-06-25 @ 12:00), Max: 36.9 (01-06-25 @ 01:20)  HR: 72 (01-06-25 @ 12:00) (58 - 86)  BP: 100/66 (01-06-25 @ 12:00) (100/56 - 130/76)  RR: 18 (01-06-25 @ 12:00) (16 - 18)  SpO2: 100% (01-06-25 @ 12:00) (99% - 100%)  Wt(kg): --  I&O's Summary      LABS:                        11.4   4.28  )-----------( 201      ( 06 Jan 2025 05:49 )             36.3     01-06    136  |  102  |  13  ----------------------------<  110[H]  3.7   |  23  |  0.56    Ca    8.2[L]      06 Jan 2025 05:49  Phos  3.4     01-06  Mg     1.90     01-06      PT/INR - ( 04 Jan 2025 15:40 )   PT: 11.1 sec;   INR: 0.93 ratio         PTT - ( 04 Jan 2025 15:40 )  PTT:34.9 sec  Urinalysis Basic - ( 06 Jan 2025 05:49 )    Color: x / Appearance: x / SG: x / pH: x  Gluc: 110 mg/dL / Ketone: x  / Bili: x / Urobili: x   Blood: x / Protein: x / Nitrite: x   Leuk Esterase: x / RBC: x / WBC x   Sq Epi: x / Non Sq Epi: x / Bacteria: x      CAPILLARY BLOOD GLUCOSE      POCT Blood Glucose.: 110 mg/dL (06 Jan 2025 12:09)  POCT Blood Glucose.: 120 mg/dL (06 Jan 2025 08:13)  POCT Blood Glucose.: 160 mg/dL (05 Jan 2025 22:28)  POCT Blood Glucose.: 177 mg/dL (05 Jan 2025 16:47)        Urinalysis Basic - ( 06 Jan 2025 05:49 )    Color: x / Appearance: x / SG: x / pH: x  Gluc: 110 mg/dL / Ketone: x  / Bili: x / Urobili: x   Blood: x / Protein: x / Nitrite: x   Leuk Esterase: x / RBC: x / WBC x   Sq Epi: x / Non Sq Epi: x / Bacteria: x        MEDICATIONS  (STANDING):  atorvastatin 80 milliGRAM(s) Oral at bedtime  dextrose 5%. 1000 milliLiter(s) (100 mL/Hr) IV Continuous <Continuous>  dextrose 5%. 1000 milliLiter(s) (50 mL/Hr) IV Continuous <Continuous>  dextrose 50% Injectable 25 Gram(s) IV Push once  dextrose 50% Injectable 12.5 Gram(s) IV Push once  dextrose 50% Injectable 25 Gram(s) IV Push once  diltiazem    Tablet 30 milliGRAM(s) Oral two times a day  glucagon  Injectable 1 milliGRAM(s) IntraMuscular once  insulin lispro (ADMELOG) corrective regimen sliding scale   SubCutaneous three times a day before meals  insulin lispro (ADMELOG) corrective regimen sliding scale   SubCutaneous at bedtime  pancrelipase  (CREON 36,000 Lipase Units) 2 Capsule(s) Oral three times a day with meals  potassium chloride   Powder 40 milliEquivalent(s) Oral once  rivaroxaban 20 milliGRAM(s) Oral with dinner    MEDICATIONS  (PRN):  acetaminophen     Tablet .. 650 milliGRAM(s) Oral every 6 hours PRN Temp greater or equal to 38C (100.4F), Mild Pain (1 - 3), Moderate Pain (4 - 6)  albuterol    90 MICROgram(s) HFA Inhaler 2 Puff(s) Inhalation every 6 hours PRN Bronchospasm  dextrose Oral Gel 15 Gram(s) Oral once PRN Blood Glucose LESS THAN 70 milliGRAM(s)/deciliter  meclizine 12.5 milliGRAM(s) Oral two times a day PRN for dizziness          PHYSICAL EXAM:  GENERAL: NAD  CHEST/LUNG: Clear to percussion bilaterally; No rales, rhonchi, wheezing, or rubs  HEART: Regular rate and rhythm; No murmurs, rubs, or gallops  ABDOMEN: Soft, Nontender, Nondistended; Bowel sounds present  EXTREMITIES: edema +    Care Discussed with Consultants/Other Providers [x ] YES  [ ] NO

## 2025-01-06 NOTE — PROGRESS NOTE ADULT - SUBJECTIVE AND OBJECTIVE BOX
CARDIOLOGY  *****************    DOS: 01/06/2025    no chest pain, SOB or Palps, ROS otherwise negative     acetaminophen     Tablet .. 650 milliGRAM(s) Oral every 6 hours PRN  albuterol    90 MICROgram(s) HFA Inhaler 2 Puff(s) Inhalation every 6 hours PRN  atorvastatin 80 milliGRAM(s) Oral at bedtime  glucagon  Injectable 1 milliGRAM(s) IntraMuscular once  insulin lispro (ADMELOG) corrective regimen sliding scale   SubCutaneous three times a day before meals  insulin lispro (ADMELOG) corrective regimen sliding scale   SubCutaneous at bedtime  pantoprazole    Tablet 40 milliGRAM(s) Oral before breakfast  potassium chloride   Powder 40 milliEquivalent(s) Oral once  rivaroxaban 20 milliGRAM(s) Oral with dinner  sucralfate 1 Gram(s) Oral four times a day  traZODone 50 milliGRAM(s) Oral daily                          11.4   4.28  )-----------( 201      ( 06 Jan 2025 05:49 )             36.3     136  |  102  |  13  ----------------------------<  110[H]  3.7   |  23  |  0.56    Ca    8.2[L]      06 Jan 2025 05:49  Phos  3.4     01-06  Mg     1.90     01-06    Creatinine Trend: 0.56<--, 0.56<--, 0.55<--, 0.59<--      T(C): 36.5 (01-06-25 @ 08:00), Max: 36.9 (01-06-25 @ 01:20)  HR: 81 (01-06-25 @ 08:00) (58 - 86)  BP: 118/67 (01-06-25 @ 08:00) (100/56 - 130/76)  RR: 18 (01-06-25 @ 08:00) (14 - 18)  SpO2: 100% (01-06-25 @ 08:00) (99% - 100%)  Wt(kg): --    I&O's Summary      Gen: NAD  HEENT:  (-)icterus (-)pallor  CV: N S1 S2 1/6 AUDI (+)2 Pulses B/l  Resp:  Clear to auscultation B/L, normal effort  GI: (+) BS Soft, NT, ND  Lymph:  (-)Edema, (-)obvious lymphadenopathy  Skin: Warm to touch, Normal turgor  Psych: Appropriate mood and affect      TELEMETRY: 	NSR      ECG:  	Sinus      < from: Cardiac Catheterization (01.23.23 @ 12:45) >    Left main artery: Angiography shows no disease.      LAD Left anterior descending artery: Angiography shows minor irregularities.    CX Circumflex: Angiography shows minor irregularities.      RCA Right coronary artery: Angiography shows minor irregularities.      < from: CT Brain Perfusion Maps Stroke (01.03.25 @ 08:11) >    IMPRESSION: Unremarkable CTA of neck and [place] of [place].    CT perfusion data as described above.      CT HEAD:  Tiny linear area of extra-axial high attenuation seen along the anterior   interhemispheric region. This best seen on series 303 image 48. The   possibility of a small acute subdural hematoma cannot be entirely   excluded. Please correlate clinically. This finding does measure   approximately 1.8 mm in widest diameter. This finding is new when   compared prior head CT performed on February 9, 2022      < from: TTE W or WO Ultrasound Enhancing Agent (01.03.25 @ 14:00) >  CONCLUSIONS:      1. Left ventricular cavity is normal in size. Left ventricular systolic function is normal with an ejection fraction of 55 % by Jc's method of disks.   2. There is mild (grade 1) left ventricular diastolic dysfunction, with normal left ventricular filling pressure.   3. Normal right ventricular cavity size, with normal wall thickness, and normal right ventricular systolic function.   4. Left atrium is normal in size.   5. The right atrium is normal in size.   6.No significant valvular disease.   7. No pericardial effusion seen.   8. Agitated saline injection was negative for intracardiac shunt.  < end of copied text >      ASSESSMENT/PLAN: Pt is a 59-year-old male with a pmh of Afib on Xarelto,  CVA  in 2010,  HLD, asth,a presented with AMS. States last night was normal around 10:30. This AM reported confusion and slurred speech. Has been compliant with Xarelto.  CT showing   Non-traumatic acute subdural hematoma given Kcentra for xarelto reversal non contrast CTH 4 hr and in AM 1/4. Denies any chest pain, SOB or orthpnea.      Afib   - [id number] of at least 2  - CT without SDH  - given Kcentra for xarelto reversal. Xarelto now resumed  - repeat CT head notes, plans for MR and repeat CT head  - c/w statin  - echo noted - NL lv / rv fx   CARDIOLOGY  *****************    DOS: 01/06/2025    no chest pain, SOB or Palps, ROS otherwise negative     acetaminophen     Tablet .. 650 milliGRAM(s) Oral every 6 hours PRN  albuterol    90 MICROgram(s) HFA Inhaler 2 Puff(s) Inhalation every 6 hours PRN  atorvastatin 80 milliGRAM(s) Oral at bedtime  glucagon  Injectable 1 milliGRAM(s) IntraMuscular once  insulin lispro (ADMELOG) corrective regimen sliding scale   SubCutaneous three times a day before meals  insulin lispro (ADMELOG) corrective regimen sliding scale   SubCutaneous at bedtime  pantoprazole    Tablet 40 milliGRAM(s) Oral before breakfast  potassium chloride   Powder 40 milliEquivalent(s) Oral once  rivaroxaban 20 milliGRAM(s) Oral with dinner  sucralfate 1 Gram(s) Oral four times a day  traZODone 50 milliGRAM(s) Oral daily                          11.4   4.28  )-----------( 201      ( 06 Jan 2025 05:49 )             36.3     136  |  102  |  13  ----------------------------<  110[H]  3.7   |  23  |  0.56    Ca    8.2[L]      06 Jan 2025 05:49  Phos  3.4     01-06  Mg     1.90     01-06    Creatinine Trend: 0.56<--, 0.56<--, 0.55<--, 0.59<--      T(C): 36.5 (01-06-25 @ 08:00), Max: 36.9 (01-06-25 @ 01:20)  HR: 81 (01-06-25 @ 08:00) (58 - 86)  BP: 118/67 (01-06-25 @ 08:00) (100/56 - 130/76)  RR: 18 (01-06-25 @ 08:00) (14 - 18)  SpO2: 100% (01-06-25 @ 08:00) (99% - 100%)  Wt(kg): --    I&O's Summary      Gen: NAD  HEENT:  (-)icterus (-)pallor  CV: N S1 S2 1/6 AUDI (+)2 Pulses B/l  Resp:  Clear to auscultation B/L, normal effort  GI: (+) BS Soft, NT, ND  Lymph:  (-)Edema, (-)obvious lymphadenopathy  Skin: Warm to touch, Normal turgor  Psych: Appropriate mood and affect      TELEMETRY: 	NSR      ECG:  	Sinus      < from: Cardiac Catheterization (01.23.23 @ 12:45) >    Left main artery: Angiography shows no disease.      LAD Left anterior descending artery: Angiography shows minor irregularities.    CX Circumflex: Angiography shows minor irregularities.      RCA Right coronary artery: Angiography shows minor irregularities.      < from: CT Brain Perfusion Maps Stroke (01.03.25 @ 08:11) >    IMPRESSION: Unremarkable CTA of neck and [place] of [place].    CT perfusion data as described above.      CT HEAD:  Tiny linear area of extra-axial high attenuation seen along the anterior   interhemispheric region. This best seen on series 303 image 48. The   possibility of a small acute subdural hematoma cannot be entirely   excluded. Please correlate clinically. This finding does measure   approximately 1.8 mm in widest diameter. This finding is new when   compared prior head CT performed on February 9, 2022      < from: TTE W or WO Ultrasound Enhancing Agent (01.03.25 @ 14:00) >  CONCLUSIONS:      1. Left ventricular cavity is normal in size. Left ventricular systolic function is normal with an ejection fraction of 55 % by Jc's method of disks.   2. There is mild (grade 1) left ventricular diastolic dysfunction, with normal left ventricular filling pressure.   3. Normal right ventricular cavity size, with normal wall thickness, and normal right ventricular systolic function.   4. Left atrium is normal in size.   5. The right atrium is normal in size.   6.No significant valvular disease.   7. No pericardial effusion seen.   8. Agitated saline injection was negative for intracardiac shunt.  < end of copied text >      ASSESSMENT/PLAN: Pt is a 59-year-old male with a pmh of Afib on Xarelto,  CVA  in 2010,  HLD, asth,a presented with AMS. States last night was normal around 10:30. This AM reported confusion and slurred speech. Has been compliant with Xarelto.  CT showing   Non-traumatic acute subdural hematoma given Kcentra for xarelto reversal non contrast CTH 4 hr and in AM 1/4. Denies any chest pain, SOB or orthpnea.      Afib   - [id number] of at least 2  - CT without SDH  - given Kcentra for xarelto reversal. Xarelto now resumed  - repeat CT head notes, plans for MR   - c/w statin  - echo noted - NL lv / rv fx

## 2025-01-06 NOTE — CHART NOTE - NSCHARTNOTEFT_GEN_A_CORE
Benefit of starting anticoagulation therapy is to be determined by covering primary team. Patient is at risk of increasing the size of their intracerebral hemorrhage (ICH), SDH, EDH, IVH if they are restarted on anticoagulation, which could increase morbidity/mortality.  Our recommendation is to hold baby/full ASA, Plavix, Coumadin, Eliquis, therapeutic dose of Lovenox or Heparin or any other blood thinners at this time. INR goal < 1.4. Xarelto should be reversed with Kcentra. Pager [phone]. Rpt CTH 4hr and 1/4 AM
EEG preliminary read (not final) on the initial recording hour(s) ~5 hrs    No seizures were recorded during this portion of the study.  Final report to follow tomorrow morning after completion of study.    This is a preliminary fellow impression.     -------------------------------------------------------------------------------------------------------  EEG reading room: [phone]    After-hours epilepsy service: [phone]    Vinny Ortiz DO  Epilepsy Fellow
Repeat CT head reviewed with attending Neurosurgeon Dr. Gerry Carrillo and with radiology  Area of hyperdensity in interhemispheric  area most likely calcification and not hemorrhage    OK TO RESUME Xarelto or any other AC/AP as needed. No neurosurgical follow up needed, will sign off

## 2025-01-06 NOTE — PHARMACOTHERAPY INTERVENTION NOTE - COMMENTS
Medication history is complete. Medication list was obtained from the patient's outpatient pharmacy ([facility]; [phone]). Patient's son confirmed that the patients uses [facility] to obtain his medications. Medication list was updated on [facility].    Home Medications:  Breztri Aerosphere 160 mcg-9 mcg-4.8 mcg/inh inhalation aerosol: 2 puff(s) inhaled once a day  dilTIAZem 30 mg oral tablet: 1 tab(s) orally 2 times a day   meclizine 12.5 mg oral tablet: 1 tab(s) orally 2 times a day as needed for  dizziness   pancrelipase 36,000 units-114,000 units-180,000 units oral delayed release capsule: 2 cap(s) orally 3 times a day and 1 capsule with snacks as needed  rosuvastatin 20 mg oral tablet: 1 tab(s) orally once a day (at bedtime)   Xarelto 20 mg oral tablet: 1 tab(s) orally once a day (in the evening)    Shelia Pagan, PharmD, BCPS  Clinical Pharmacy Specialist  [facility] [phone]

## 2025-01-06 NOTE — PROGRESS NOTE ADULT - SUBJECTIVE AND OBJECTIVE BOX
Neurology Progress Note    S: Patient seen and examined. No new events overnight. patient denied CP, SOB, HA or pain.     Medication:  acetaminophen     Tablet .. 650 milliGRAM(s) Oral every 6 hours PRN  albuterol    90 MICROgram(s) HFA Inhaler 2 Puff(s) Inhalation every 6 hours PRN  atorvastatin 80 milliGRAM(s) Oral at bedtime  dextrose 5%. 1000 milliLiter(s) IV Continuous <Continuous>  dextrose 5%. 1000 milliLiter(s) IV Continuous <Continuous>  dextrose 50% Injectable 25 Gram(s) IV Push once  dextrose 50% Injectable 12.5 Gram(s) IV Push once  dextrose 50% Injectable 25 Gram(s) IV Push once  dextrose Oral Gel 15 Gram(s) Oral once PRN  diltiazem    Tablet 30 milliGRAM(s) Oral two times a day  glucagon  Injectable 1 milliGRAM(s) IntraMuscular once  insulin lispro (ADMELOG) corrective regimen sliding scale   SubCutaneous three times a day before meals  insulin lispro (ADMELOG) corrective regimen sliding scale   SubCutaneous at bedtime  meclizine 12.5 milliGRAM(s) Oral two times a day PRN  pancrelipase  (CREON 36,000 Lipase Units) 2 Capsule(s) Oral three times a day with meals  potassium chloride   Powder 40 milliEquivalent(s) Oral once  rivaroxaban 20 milliGRAM(s) Oral with dinner      Vitals:  Vital Signs Last 24 Hrs  T(C): 36.4 (06 Jan 2025 12:00), Max: 36.9 (06 Jan 2025 01:20)  T(F): 97.5 (06 Jan 2025 12:00), Max: 98.4 (06 Jan 2025 01:20)  HR: 72 (06 Jan 2025 12:00) (58 - 86)  BP: 100/66 (06 Jan 2025 12:00) (100/56 - 130/76)  BP(mean): --  RR: 18 (06 Jan 2025 12:00) (16 - 18)  SpO2: 100% (06 Jan 2025 12:00) (99% - 100%)    Parameters below as of 06 Jan 2025 12:00  Patient On (Oxygen Delivery Method): room air        General Exam:   General Appearance: Appropriately dressed and in no acute distress       Head: Normocephalic, atraumatic and no dysmorphic features  Ear, Nose, and Throat: Moist mucous membranes  CVS: S1S2+  Resp: No SOB, no wheeze or rhonchi  Abd: soft NTND  Extremities: No edema, no cyanosis  Skin: No bruises, no rashes     Neurological Exam:  Mental Status: Awake, alert and oriented x 3.  Able to follow simple and complex verbal commands. Able to name and repeat. fluent speech. No obvious aphasia or dysarthria noted.   Cranial Nerves: PERRL, EOMI, VFFC, sensation V1-V3 intact,  no obvious facial asymmetry , equal elevation of palate, scm/trap 5/5, tongue is midline on protrusion. Hearing is grossly intact.   Motor: Normal bulk, tone and strength throughout. Fine finger movements were intact and symmetric. no tremors or drift noted.    Sensation: Intact to light touch and pinprick throughout. no right/left confusion. no extinction to tactile on DSS.   Coordination: No dysmetria on FNF   Gait: deferred    I personally reviewed the below data/images/labs:      CBC Full  -  ( 06 Jan 2025 05:49 )  WBC Count : 4.28 K/uL  RBC Count : 4.93 M/uL  Hemoglobin : 11.4 g/dL  Hematocrit : 36.3 %  Platelet Count - Automated : 201 K/uL  Mean Cell Volume : 73.6 fL  Mean Cell Hemoglobin : 23.1 pg  Mean Cell Hemoglobin Concentration : 31.4 g/dL  Auto Neutrophil # : 2.07 K/uL  Auto Lymphocyte # : 1.69 K/uL  Auto Monocyte # : 0.39 K/uL  Auto Eosinophil # : 0.10 K/uL  Auto Basophil # : 0.02 K/uL  Auto Neutrophil % : 48.4 %  Auto Lymphocyte % : 39.5 %  Auto Monocyte % : 9.1 %  Auto Eosinophil % : 2.3 %  Auto Basophil % : 0.5 %  n  01-06    136  |  102  |  13  ----------------------------<  110[H]  3.7   |  23  |  0.56    Ca    8.2[L]      06 Jan 2025 05:49  Phos  3.4     01-06  Mg     1.90     01-06        PT/INR - ( 04 Jan 2025 15:40 )   PT: 11.1 sec;   INR: 0.93 ratio         PTT - ( 04 Jan 2025 15:40 )  PTT:34.9 sec  Urinalysis Basic - ( 06 Jan 2025 05:49 )    Color: x / Appearance: x / SG: x / pH: x  Gluc: 110 mg/dL / Ketone: x  / Bili: x / Urobili: x   Blood: x / Protein: x / Nitrite: x   Leuk Esterase: x / RBC: x / WBC x   Sq Epi: x / Non Sq Epi: x / Bacteria: x      RADIOLOGY & ADDITIONAL STUDIES:    < from: CT Brain Stroke Protocol (01.03.25 @ 08:10) >  The lateral ventricles have a normal    Prominent cystic area involving the left sublenticular region is again   seen unchanged    There is no acute hemorrhage mass or mass effect seen.    Left maxillary polyp versus retention cyst is seen    Both mastoid and meniscal clear.    IMPRESSION: Stable noncontrast head CT.    < end of copied text >  < from: CT Brain Stroke Protocol (01.03.25 @ 08:10) >  *** ADDENDUM # 1 ***    Tiny linear area of extra-axial high attenuation seen along the anterior   interhemispheric region. This best seen on series 303 image 48. The   possibility of a small acute subdural hematoma cannot be entirely   excluded. Please correlate clinically. This finding does measure   approximately 1.8 mm in widest diameter. This finding is new when   compared prior head CT performed on February 9, 2022    < end of copied text >  < from: CT Angio Neck Stroke Protocol w/ IV Cont (01.03.25 @ 08:10) >  CBF<30%: 0.0 mL  MAXIMUM TEMPERATURE> 6.0F: 0.0 mL  Mismatch volume: 0.0 mL  Mismatch ratio: None    Both distal common carotid, proximal internal and external carotid   arteries appear normal as well as both vertebral arteries. No significant   stenosis is seen.    Both distal internal carotid arteries appear normal    Hypoplastic right A1 segment is seen. Both anterior cerebral middle   cerebral basilar and posterior cerebral arteries appear normal without   evidence of an aneurysm or significant stenosis    The dural venous sinuses demonstrate normal enhancement    Evaluation soft tissue neck region appears normal    The visualized was both lung apices appear clear. Postop changes   involving the cervical spine are seen with degenerative changes.    IMPRESSION: Unremarkable CTA of neck and [place].    CT perfusion data as described above.    < end of copied text >

## 2025-01-07 ENCOUNTER — TRANSCRIPTION ENCOUNTER (OUTPATIENT)
Age: 60
End: 2025-01-07

## 2025-01-07 VITALS
TEMPERATURE: 98 F | OXYGEN SATURATION: 98 % | RESPIRATION RATE: 17 BRPM | DIASTOLIC BLOOD PRESSURE: 67 MMHG | SYSTOLIC BLOOD PRESSURE: 110 MMHG | HEART RATE: 94 BPM

## 2025-01-07 LAB
ANION GAP SERPL CALC-SCNC: 10 MMOL/L — SIGNIFICANT CHANGE UP (ref 7–14)
BUN SERPL-MCNC: 15 MG/DL — SIGNIFICANT CHANGE UP (ref 7–23)
CALCIUM SERPL-MCNC: 8.4 MG/DL — SIGNIFICANT CHANGE UP (ref 8.4–10.5)
CHLORIDE SERPL-SCNC: 105 MMOL/L — SIGNIFICANT CHANGE UP (ref 98–107)
CO2 SERPL-SCNC: 24 MMOL/L — SIGNIFICANT CHANGE UP (ref 22–31)
CREAT SERPL-MCNC: 0.69 MG/DL — SIGNIFICANT CHANGE UP (ref 0.5–1.3)
EGFR: 105 ML/MIN/1.73M2 — SIGNIFICANT CHANGE UP
GLUCOSE BLDC GLUCOMTR-MCNC: 146 MG/DL — HIGH (ref 70–99)
GLUCOSE BLDC GLUCOMTR-MCNC: 84 MG/DL — SIGNIFICANT CHANGE UP (ref 70–99)
GLUCOSE SERPL-MCNC: 108 MG/DL — HIGH (ref 70–99)
HCT VFR BLD CALC: 37 % — LOW (ref 39–50)
HGB BLD-MCNC: 11.5 G/DL — LOW (ref 13–17)
MAGNESIUM SERPL-MCNC: 2 MG/DL — SIGNIFICANT CHANGE UP (ref 1.6–2.6)
MCHC RBC-ENTMCNC: 22.7 PG — LOW (ref 27–34)
MCHC RBC-ENTMCNC: 31.1 G/DL — LOW (ref 32–36)
MCV RBC AUTO: 73 FL — LOW (ref 80–100)
NRBC # BLD: 0 /100 WBCS — SIGNIFICANT CHANGE UP (ref 0–0)
NRBC # FLD: 0 K/UL — SIGNIFICANT CHANGE UP (ref 0–0)
PLATELET # BLD AUTO: 205 K/UL — SIGNIFICANT CHANGE UP (ref 150–400)
POTASSIUM SERPL-MCNC: 4.1 MMOL/L — SIGNIFICANT CHANGE UP (ref 3.5–5.3)
POTASSIUM SERPL-SCNC: 4.1 MMOL/L — SIGNIFICANT CHANGE UP (ref 3.5–5.3)
RBC # BLD: 5.07 M/UL — SIGNIFICANT CHANGE UP (ref 4.2–5.8)
RBC # FLD: 14.3 % — SIGNIFICANT CHANGE UP (ref 10.3–14.5)
SODIUM SERPL-SCNC: 139 MMOL/L — SIGNIFICANT CHANGE UP (ref 135–145)
WBC # BLD: 4.2 K/UL — SIGNIFICANT CHANGE UP (ref 3.8–10.5)
WBC # FLD AUTO: 4.2 K/UL — SIGNIFICANT CHANGE UP (ref 3.8–10.5)

## 2025-01-07 RX ORDER — CHLORHEXIDINE GLUCONATE 1.2 MG/ML
1 RINSE ORAL DAILY
Refills: 0 | Status: DISCONTINUED | OUTPATIENT
Start: 2025-01-07 | End: 2025-01-07

## 2025-01-07 RX ADMIN — DILTIAZEM HYDROCHLORIDE 30 MILLIGRAM(S): 300 CAPSULE, COATED, EXTENDED RELEASE ORAL at 06:18

## 2025-01-07 RX ADMIN — CHLORHEXIDINE GLUCONATE 1 APPLICATION(S): 1.2 RINSE ORAL at 11:45

## 2025-01-07 RX ADMIN — Medication 2 CAPSULE(S): at 09:15

## 2025-01-07 RX ADMIN — Medication 2 CAPSULE(S): at 11:45

## 2025-01-07 NOTE — DISCHARGE NOTE NURSING/CASE MANAGEMENT/SOCIAL WORK - PATIENT PORTAL LINK FT
You can access the FollowMyHealth Patient Portal offered by [facility] by registering at the following website: [email or web address]. By joining [employer]’s FollowMyHealth portal, you will also be able to view your health information using other applications (apps) compatible with our system.

## 2025-01-07 NOTE — DISCHARGE NOTE NURSING/CASE MANAGEMENT/SOCIAL WORK - NSDCPEFALRISK_GEN_ALL_CORE
For information on Fall & Injury Prevention, visit: [email or web address] OR  [email or web address] OR  https://www.cdc.gov/steadi/patient.html

## 2025-01-07 NOTE — DISCHARGE NOTE PROVIDER - NSDCFUADDAPPT_GEN_ALL_CORE_FT
followup with Dr Peck for MRI brain outpatient  Followup with electrophysiologist Dr Campoverde and cardiology Dr Manzano for continue care for afib

## 2025-01-07 NOTE — DISCHARGE NOTE PROVIDER - HOSPITAL COURSE
58 y/o male, with a PmHx of A-fib (on Xarelto), borderline diabetic, Asthma, Gerd, HLD, HTN, p/w altered mental status.     Hospital course:    AMS  - CTH: possibility of a small acute subdural hematoma cannot be entirely   excluded.  - CTA H/N: unremarkable   - repeat CTH 1/3: High attenuation along the interhemispheric region is again   seen and slightly less conspicuous.  - CTH 1/4: No definite acute intracranial hemorrhage. Stable thin hyperdensity along the anterior interhemispheric fissure is favored to be calcification.  - House Neuro: low risk for an acute cva. DDx includes seizure vs toxic metabolic encephalopathy  - NSGY: Area of hyperdensity in interhemispheric  area most likely calcification and not hemorrhage. okay to resume AC. no followup needed  - s/p 1 dose of Kcentra for xarelto reversal   - vEEG negative   - TTE: EF 55, mild (grade 1) left ventricular diastolic dysfunction. negative bubble study  - MRI Brain w/o cont outpatient   - PT: outpt PT     Afib  - CHADSVasc: 2  - cont Xarelto, diltiazem  - outpatient watchman eval  - Cardio c/s Dr. Manzano following    DM2  - A1C: 6.5    HTN/HLD  - statin, not on BP meds     Case discussed with Dr Manzano on 1/7 pt medically cleared for discharge to home with outpt PT

## 2025-01-07 NOTE — EEG REPORT - NS EEG TEXT BOX
[facility]   COMPREHENSIVE EPILEPSY CENTER   REPORT OF LONG-TERM VIDEO EEG     [facility]: [address], [place] [address], Ph#: [phone]  [facility]: [address] [address], [place] [address], Ph#: [phone]  [facility]: [address] [place] [address], Ph#: [phone]    Patient Name: PILO PALENCIA  Age and : 59y ([birth year])  MRN #: [id number]  Location: [place] [address] [address]  Referring Physician: Darin Manzano    Start Time/Date: 15:53 on 2025  End Time/Date: 08:00 on 2025  Duration: 16 hours 7 minutes    _____________________________________________________________  STUDY INFORMATION    EEG Recording Technique:  The patient underwent continuous Video-EEG monitoring, using Telemetry System hardware on the XLTek Digital System. EEG and video data were stored on a computer hard drive with important events saved in digital archive files. The material was reviewed by a physician (electroencephalographer / epileptologist) on a daily basis. Lyndon and seizure detection algorithms were utilized and reviewed. An EEG Technician attended to the patient, and was available throughout daytime work hours.  The epilepsy center neurologist was available in person or on call 24-hours per day.    EEG Placement and Labeling of Electrodes:  The EEG was performed utilizing 20 channel referential EEG connections (coronal over temporal over parasagittal montage) using all standard 10-20 electrode placements with EKG, with additional electrodes placed in the inferior temporal region using the modified 10-10 montage electrode placements for elective admissions, or if deemed necessary. Recording was at a sampling rate of 256 samples per second per channel. Time synchronized digital video recording was done simultaneously with EEG recording. A low light infrared camera was used for low light recording.     _____________________________________________________________  HISTORY    Patient is a 59y old  Male who presents with a chief complaint of AMS (2025 14:08)      PERTINENT MEDICATION:  MEDICATIONS  (STANDING):  atorvastatin 80 milliGRAM(s) Oral at bedtime  chlorhexidine 2% Cloths 1 Application(s) Topical daily  dextrose 5%. 1000 milliLiter(s) (100 mL/Hr) IV Continuous <Continuous>  dextrose 5%. 1000 milliLiter(s) (50 mL/Hr) IV Continuous <Continuous>  dextrose 50% Injectable 25 Gram(s) IV Push once  dextrose 50% Injectable 12.5 Gram(s) IV Push once  dextrose 50% Injectable 25 Gram(s) IV Push once  diltiazem    Tablet 30 milliGRAM(s) Oral two times a day  glucagon  Injectable 1 milliGRAM(s) IntraMuscular once  insulin lispro (ADMELOG) corrective regimen sliding scale   SubCutaneous three times a day before meals  insulin lispro (ADMELOG) corrective regimen sliding scale   SubCutaneous at bedtime  pancrelipase  (CREON 36,000 Lipase Units) 2 Capsule(s) Oral three times a day with meals  potassium chloride   Powder 40 milliEquivalent(s) Oral once  rivaroxaban 20 milliGRAM(s) Oral with dinner    _____________________________________________________________  STUDY INTERPRETATION      FINDINGS:      Background:  Continuity: continuous  Symmetry: symmetric  PDR: 9 Hz activity, with amplitude to 40 uV, that attenuated to eye opening.  Low amplitude frontal beta noted in wakefulness.  Reactivity: present  Voltage: normal, mostly 20-150uV  Anterior Posterior Gradient: present  Other background findings: none  Breach: absent    Background Slowing:  Generalized slowing: none was present.  Focal slowing: none was present.    State Changes:   -Drowsiness noted with increased slowing, attenuation of fast activity, vertex transients.  -Present with N2 sleep transients with symmetric spindles and K-complexes.    Sporadic Epileptiform Discharges:    None    Rhythmic and Periodic Patterns (RPPs):  None     Electrographic and Electroclinical seizures:  None    Other Clinical Events:  None    Activation Procedures:   -Hyperventilation was performed and did not elicit any abnormalities.    -Photic stimulation was performed and did not elicit any abnormalities.      Artifacts:  Intermittent myogenic and movement artifacts were noted.    ECG:  The heart rate on single channel ECG was predominantly ~80 bpm.    Summary:  Normal EEG in the awake / drowsy / asleep state(s).    Clinical Impression:  There were no epileptiform abnormalities recorded.          -------------------------------------------------------------------------------------------------------    Yudi Irby MD  Director, Epilepsy - [facility]    Questions please call [phone]  After hours (5 PM - 8:30 AM) please call the epilepsy answering service at [phone]       [facility]   COMPREHENSIVE EPILEPSY CENTER   REPORT OF LONG-TERM VIDEO EEG     [facility]: [address], [place] [address], Ph#: [phone]  [facility]: [address] [address], [place] [address], Ph#: [phone]  [facility]: [address] [place] [address], Ph#: [phone]    Patient Name: PILO PALENCIA  Age and : 59y (61)  MRN #: [id number]  Location: [place] [address] [address]  Referring Physician: Darin Manzano    Start Time/Date: 15:53 on 2025  End Time/Date: 11:22 on 2025  Duration: 19 hours 29 minutes    _____________________________________________________________  STUDY INFORMATION    EEG Recording Technique:  The patient underwent continuous Video-EEG monitoring, using Telemetry System hardware on the XLTek Digital System. EEG and video data were stored on a computer hard drive with important events saved in digital archive files. The material was reviewed by a physician (electroencephalographer / epileptologist) on a daily basis. Lyndon and seizure detection algorithms were utilized and reviewed. An EEG Technician attended to the patient, and was available throughout daytime work hours.  The epilepsy center neurologist was available in person or on call 24-hours per day.    EEG Placement and Labeling of Electrodes:  The EEG was performed utilizing 20 channel referential EEG connections (coronal over temporal over parasagittal montage) using all standard 10-20 electrode placements with EKG, with additional electrodes placed in the inferior temporal region using the modified 10-10 montage electrode placements for elective admissions, or if deemed necessary. Recording was at a sampling rate of 256 samples per second per channel. Time synchronized digital video recording was done simultaneously with EEG recording. A low light infrared camera was used for low light recording.     _____________________________________________________________  HISTORY    Patient is a 59y old  Male who presents with a chief complaint of AMS (2025 14:08)      PERTINENT MEDICATION:  MEDICATIONS  (STANDING):  atorvastatin 80 milliGRAM(s) Oral at bedtime  chlorhexidine 2% Cloths 1 Application(s) Topical daily  dextrose 5%. 1000 milliLiter(s) (100 mL/Hr) IV Continuous <Continuous>  dextrose 5%. 1000 milliLiter(s) (50 mL/Hr) IV Continuous <Continuous>  dextrose 50% Injectable 25 Gram(s) IV Push once  dextrose 50% Injectable 12.5 Gram(s) IV Push once  dextrose 50% Injectable 25 Gram(s) IV Push once  diltiazem    Tablet 30 milliGRAM(s) Oral two times a day  glucagon  Injectable 1 milliGRAM(s) IntraMuscular once  insulin lispro (ADMELOG) corrective regimen sliding scale   SubCutaneous three times a day before meals  insulin lispro (ADMELOG) corrective regimen sliding scale   SubCutaneous at bedtime  pancrelipase  (CREON 36,000 Lipase Units) 2 Capsule(s) Oral three times a day with meals  potassium chloride   Powder 40 milliEquivalent(s) Oral once  rivaroxaban 20 milliGRAM(s) Oral with dinner    _____________________________________________________________  STUDY INTERPRETATION      FINDINGS:      Background:  Continuity: continuous  Symmetry: symmetric  PDR: 9 Hz activity, with amplitude to 40 uV, that attenuated to eye opening.  Low amplitude frontal beta noted in wakefulness.  Reactivity: present  Voltage: normal, mostly 20-150uV  Anterior Posterior Gradient: present  Other background findings: none  Breach: absent    Background Slowing:  Generalized slowing: none was present.  Focal slowing: none was present.    State Changes:   -Drowsiness noted with increased slowing, attenuation of fast activity, vertex transients.  -Present with N2 sleep transients with symmetric spindles and K-complexes.    Sporadic Epileptiform Discharges:    None    Rhythmic and Periodic Patterns (RPPs):  None     Electrographic and Electroclinical seizures:  None    Other Clinical Events:  None    Activation Procedures:   -Hyperventilation was performed and did not elicit any abnormalities.    -Photic stimulation was performed and did not elicit any abnormalities.      Artifacts:  Intermittent myogenic and movement artifacts were noted.    ECG:  The heart rate on single channel ECG was predominantly ~80 bpm.    Summary:  Normal EEG in the awake / drowsy / asleep state(s).    Clinical Impression:  There were no epileptiform abnormalities recorded.          -------------------------------------------------------------------------------------------------------    Yudi Irby MD  Director, Epilepsy - [facility]    Questions please call [phone]  After hours (5 PM - 8:30 AM) please call the epilepsy answering service at [phone]

## 2025-01-07 NOTE — DISCHARGE NOTE PROVIDER - PROVIDER TOKENS
PROVIDER:[TOKEN:[[id number]]],PROVIDER:[TOKEN:[[id number]]],PROVIDER:[TOKEN:[[id number]]]

## 2025-01-07 NOTE — DISCHARGE NOTE PROVIDER - CARE PROVIDER_API CALL
DISPLAY PLAN FREE TEXT DISPLAY PLAN FREE TEXT DISPLAY PLAN FREE TEXT DISPLAY PLAN FREE TEXT DISPLAY PLAN FREE TEXT DISPLAY PLAN FREE TEXT DISPLAY PLAN FREE TEXT Ro Peck  Neurology  [address]  [place] [address]  Phone: [phone]  Fax: [phone]  Follow Up Time:     Rubin Campoverde  Cardiac Electrophysiology  [address] [place] [address], [address]  [place] [address]  Phone: [phone]  Fax: [phone]  Follow Up Time:     Yarelis Manzano  Interventional Cardiology  [address] [facility] [id number]  [place] [address]  Phone: [phone]  Fax: [phone]  Follow Up Time:    DISPLAY PLAN FREE TEXT DISPLAY PLAN FREE TEXT DISPLAY PLAN FREE TEXT DISPLAY PLAN FREE TEXT

## 2025-01-07 NOTE — DISCHARGE NOTE NURSING/CASE MANAGEMENT/SOCIAL WORK - FINANCIAL ASSISTANCE
[facility] provides services at a reduced cost to those who are determined to be eligible through [facility]’s financial assistance program. Information regarding [facility]’s financial assistance program can be found by going to [email or web address] or by calling [phone].

## 2025-01-07 NOTE — DISCHARGE NOTE PROVIDER - NSDCCPCAREPLAN_GEN_ALL_CORE_FT
PRINCIPAL DISCHARGE DIAGNOSIS  Diagnosis: Slurred speech  Assessment and Plan of Treatment: you were seen by neurology recommended for MRI and vEEG. vEEG with no seizure. Pt wishes to perform MRI outpatient (agreed with neurology). Followup with neurology in 1-2 weeks      SECONDARY DISCHARGE DIAGNOSES  Diagnosis: Afib  Assessment and Plan of Treatment: followup with electrophysiologist/cardiology outpatient    Diagnosis: Diabetes type 2  Assessment and Plan of Treatment: Goal A1C 7.0. Your A1C is 6.5  Hypoglycemia management: please check your fingerstick every morning or if you are not feeling well. If your FS is >300mg/dl X3 or more readings please contact your PMD/Endocrinologist. If your FS is low <70mg/dl and/or you have symptoms of very low blood sugar FIRST drink1/2 cup of apple juice (or take 4 glucose tab/tube of glucose gel) and recheck FS in 15mins. Repeat these steps until blood sugar is above 100mg/dl, if NECESSARY. Then call your provider to discuss low blood sugar.   What to expend at followup appointment: please bring a log of your fingerstick and/or your glucometer to you appointment. Your blood sugar tracking will help your diabetes team determine the best plan    Diagnosis: Hypertension  Assessment and Plan of Treatment: Low sodium and fat diet, continue anti-hypertensive medications, and follow up with primary care physician.

## 2025-01-07 NOTE — PROGRESS NOTE ADULT - SUBJECTIVE AND OBJECTIVE BOX
CARDIOLOGY  *****************  DATE OF SERVICE: 01-07-25    Patient denies chest pain or shortness of breath.   Review of symptoms otherwise negative.    MEDICATIONS:  acetaminophen     Tablet .. 650 milliGRAM(s) Oral every 6 hours PRN  albuterol    90 MICROgram(s) HFA Inhaler 2 Puff(s) Inhalation every 6 hours PRN  atorvastatin 80 milliGRAM(s) Oral at bedtime  chlorhexidine 2% Cloths 1 Application(s) Topical daily  dextrose 5%. 1000 milliLiter(s) IV Continuous <Continuous>  dextrose 5%. 1000 milliLiter(s) IV Continuous <Continuous>  dextrose 50% Injectable 25 Gram(s) IV Push once  dextrose 50% Injectable 12.5 Gram(s) IV Push once  dextrose 50% Injectable 25 Gram(s) IV Push once  dextrose Oral Gel 15 Gram(s) Oral once PRN  diltiazem    Tablet 30 milliGRAM(s) Oral two times a day  glucagon  Injectable 1 milliGRAM(s) IntraMuscular once  insulin lispro (ADMELOG) corrective regimen sliding scale   SubCutaneous three times a day before meals  insulin lispro (ADMELOG) corrective regimen sliding scale   SubCutaneous at bedtime  meclizine 12.5 milliGRAM(s) Oral two times a day PRN  pancrelipase  (CREON 36,000 Lipase Units) 2 Capsule(s) Oral three times a day with meals  potassium chloride   Powder 40 milliEquivalent(s) Oral once  rivaroxaban 20 milliGRAM(s) Oral with dinner      LABS:                        11.5   4.20  )-----------( 205      ( 07 Jan 2025 06:00 )             37.0       Hemoglobin: 11.5 g/dL (01-07 @ 06:00)  Hemoglobin: 11.4 g/dL (01-06 @ 05:49)  Hemoglobin: 11.2 g/dL (01-05 @ 05:02)  Hemoglobin: 11.8 g/dL (01-04 @ 07:15)  Hemoglobin: 13.3 g/dL (01-03 @ 07:55)      01-07    139  |  105  |  15  ----------------------------<  108[H]  4.1   |  24  |  0.69    Ca    8.4      07 Jan 2025 06:00  Phos  3.4     01-06  Mg     2.00     01-07      Creatinine Trend: 0.69<--, 0.56<--, 0.56<--, 0.55<--, 0.59<--    COAGS:           PHYSICAL EXAM:  T(C): 36.7 (01-07-25 @ 08:00), Max: 36.7 (01-07-25 @ 08:00)  HR: 76 (01-07-25 @ 08:00) (68 - 84)  BP: 114/64 (01-07-25 @ 08:00) (100/66 - 124/72)  RR: 18 (01-07-25 @ 08:00) (18 - 18)  SpO2: 99% (01-07-25 @ 08:00) (99% - 100%)  Wt(kg): --    I&O's Summary    06 Jan 2025 07:01  -  07 Jan 2025 07:00  --------------------------------------------------------  IN: 0 mL / OUT: 700 mL / NET: -700 mL          Gen: NAD  HEENT:  (-)icterus (-)pallor  CV: N S1 S2 1/6 AUDI (+)2 Pulses B/l  Resp:  Clear to auscultation B/L, normal effort  GI: (+) BS Soft, NT, ND  Lymph:  (-)Edema, (-)obvious lymphadenopathy  Skin: Warm to touch, Normal turgor  Psych: Appropriate mood and affect      TELEMETRY: 	NSR      ECG:  	Sinus      < from: Cardiac Catheterization (01.23.23 @ 12:45) >    Left main artery: Angiography shows no disease.      LAD Left anterior descending artery: Angiography shows minor irregularities.    CX Circumflex: Angiography shows minor irregularities.      RCA Right coronary artery: Angiography shows minor irregularities.      < from: CT Brain Perfusion Maps Stroke (01.03.25 @ 08:11) >    IMPRESSION: Unremarkable CTA of neck and [place].    CT perfusion data as described above.      CT HEAD:  Tiny linear area of extra-axial high attenuation seen along the anterior   interhemispheric region. This best seen on series 303 image 48. The   possibility of a small acute subdural hematoma cannot be entirely   excluded. Please correlate clinically. This finding does measure   approximately 1.8 mm in widest diameter. This finding is new when   compared prior head CT performed on February 9, 2022      < from: TTE W or WO Ultrasound Enhancing Agent (01.03.25 @ 14:00) >  CONCLUSIONS:      1. Left ventricular cavity is normal in size. Left ventricular systolic function is normal with an ejection fraction of 55 % by Jc's method of disks.   2. There is mild (grade 1) left ventricular diastolic dysfunction, with normal left ventricular filling pressure.   3. Normal right ventricular cavity size, with normal wall thickness, and normal right ventricular systolic function.   4. Left atrium is normal in size.   5. The right atrium is normal in size.   6.No significant valvular disease.   7. No pericardial effusion seen.   8. Agitated saline injection was negative for intracardiac shunt.  < end of copied text >      ASSESSMENT/PLAN: Pt is a 59-year-old male with a pmh of Afib on Xarelto,  CVA  in 2010,  HLD, asth,a presented with AMS. States last night was normal around 10:30. This AM reported confusion and slurred speech. Has been compliant with Xarelto.  CT showing   Non-traumatic acute subdural hematoma given Kcentra for xarelto reversal non contrast CTH 4 hr and in AM 1/4. Denies any chest pain, SOB or orthpnea.      Afib   - [id number] of at least 2  - CT without SDH  - given Kcentra for xarelto reversal. Xarelto now resumed  - repeat CT head notes, plans for MR   - c/w statin  - echo noted - NL lv / rv fx    Yarelis Manzano MD  Pager: [phone]

## 2025-01-07 NOTE — DISCHARGE NOTE PROVIDER - NSDCFUSCHEDAPPT_GEN_ALL_CORE_FT
Eric Bryant  [facility] Physician [facility]  CARDIOLOGY [address] [place] [address]   Scheduled Appointment: 02/05/2025

## 2025-01-07 NOTE — DISCHARGE NOTE PROVIDER - NSDCMRMEDTOKEN_GEN_ALL_CORE_FT
Albuterol (Eqv-Proventil HFA) 90 mcg/inh inhalation aerosol: 4 puff(s) inhaled every 4 hours  Breztri Aerosphere 160 mcg-9 mcg-4.8 mcg/inh inhalation aerosol: 2 puff(s) inhaled once a day  dilTIAZem 30 mg oral tablet: 1 tab(s) orally 2 times a day  meclizine 12.5 mg oral tablet: 1 tab(s) orally 2 times a day as needed for  dizziness  pancrelipase 36,000 units-114,000 units-180,000 units oral delayed release capsule: 2 cap(s) orally 3 times a day and 1 capsule with snacks as needed  rosuvastatin 20 mg oral tablet: 1 tab(s) orally once a day (at bedtime)  Xarelto 20 mg oral tablet: 1 tab(s) orally once a day (in the evening)

## 2025-01-07 NOTE — PROGRESS NOTE ADULT - SUBJECTIVE AND OBJECTIVE BOX
Neurology Progress Note    S: Patient seen and examined. No new events overnight. EEG neg    Medications: MEDICATIONS  (STANDING):  atorvastatin 80 milliGRAM(s) Oral at bedtime  chlorhexidine 2% Cloths 1 Application(s) Topical daily  dextrose 5%. 1000 milliLiter(s) (100 mL/Hr) IV Continuous <Continuous>  dextrose 5%. 1000 milliLiter(s) (50 mL/Hr) IV Continuous <Continuous>  dextrose 50% Injectable 25 Gram(s) IV Push once  dextrose 50% Injectable 12.5 Gram(s) IV Push once  dextrose 50% Injectable 25 Gram(s) IV Push once  diltiazem    Tablet 30 milliGRAM(s) Oral two times a day  glucagon  Injectable 1 milliGRAM(s) IntraMuscular once  insulin lispro (ADMELOG) corrective regimen sliding scale   SubCutaneous three times a day before meals  insulin lispro (ADMELOG) corrective regimen sliding scale   SubCutaneous at bedtime  pancrelipase  (CREON 36,000 Lipase Units) 2 Capsule(s) Oral three times a day with meals  potassium chloride   Powder 40 milliEquivalent(s) Oral once  rivaroxaban 20 milliGRAM(s) Oral with dinner    MEDICATIONS  (PRN):  acetaminophen     Tablet .. 650 milliGRAM(s) Oral every 6 hours PRN Temp greater or equal to 38C (100.4F), Mild Pain (1 - 3), Moderate Pain (4 - 6)  albuterol    90 MICROgram(s) HFA Inhaler 2 Puff(s) Inhalation every 6 hours PRN Bronchospasm  dextrose Oral Gel 15 Gram(s) Oral once PRN Blood Glucose LESS THAN 70 milliGRAM(s)/deciliter  meclizine 12.5 milliGRAM(s) Oral two times a day PRN for dizziness       Vitals:  Vital Signs Last 24 Hrs  T(C): 36.7 (07 Jan 2025 08:00), Max: 36.7 (07 Jan 2025 08:00)  T(F): 98 (07 Jan 2025 08:00), Max: 98 (07 Jan 2025 08:00)  HR: 76 (07 Jan 2025 08:00) (68 - 84)  BP: 114/64 (07 Jan 2025 08:00) (100/66 - 124/72)  BP(mean): --  RR: 18 (07 Jan 2025 08:00) (18 - 18)  SpO2: 99% (07 Jan 2025 08:00) (99% - 100%)    Parameters below as of 07 Jan 2025 08:00  Patient On (Oxygen Delivery Method): room air              General Exam:   General Appearance: Appropriately dressed and in no acute distress       Head: Normocephalic, atraumatic and no dysmorphic features  Ear, Nose, and Throat: Moist mucous membranes  CVS: S1S2+  Resp: No SOB, no wheeze or rhonchi  Abd: soft NTND  Extremities: No edema, no cyanosis  Skin: No bruises, no rashes     Neurological Exam:  Mental Status: Awake, alert and oriented x 3.  Able to follow simple and complex verbal commands. Able to name and repeat. fluent speech. No obvious aphasia or dysarthria noted.   Cranial Nerves: PERRL, EOMI, VFFC, sensation V1-V3 intact,  no obvious facial asymmetry , equal elevation of palate, scm/trap 5/5, tongue is midline on protrusion. Hearing is grossly intact.   Motor: Normal bulk, tone and strength throughout. Fine finger movements were intact and symmetric. no tremors or drift noted.    Sensation: Intact to light touch and pinprick throughout. no right/left confusion. no extinction to tactile on DSS.   Coordination: No dysmetria on FNF   Gait: deferred    I personally reviewed the below data/images/labs:    LABS:                          11.5   4.20  )-----------( 205      ( 07 Jan 2025 06:00 )             37.0     01-07    139  |  105  |  15  ----------------------------<  108[H]  4.1   |  24  |  0.69    Ca    8.4      07 Jan 2025 06:00  Phos  3.4     01-06  Mg     2.00     01-07          Urinalysis Basic - ( 07 Jan 2025 06:00 )    Color: x / Appearance: x / SG: x / pH: x  Gluc: 108 mg/dL / Ketone: x  / Bili: x / Urobili: x   Blood: x / Protein: x / Nitrite: x   Leuk Esterase: x / RBC: x / WBC x   Sq Epi: x / Non Sq Epi: x / Bacteria: x        RADIOLOGY & ADDITIONAL STUDIES:    < from: CT Brain Stroke Protocol (01.03.25 @ 08:10) >  The lateral ventricles have a normal    Prominent cystic area involving the left sublenticular region is again   seen unchanged    There is no acute hemorrhage mass or mass effect seen.    Left maxillary polyp versus retention cyst is seen    Both mastoid and meniscal clear.    IMPRESSION: Stable noncontrast head CT.    < end of copied text >  < from: CT Brain Stroke Protocol (01.03.25 @ 08:10) >  *** ADDENDUM # 1 ***    Tiny linear area of extra-axial high attenuation seen along the anterior   interhemispheric region. This best seen on series 303 image 48. The   possibility of a small acute subdural hematoma cannot be entirely   excluded. Please correlate clinically. This finding does measure   approximately 1.8 mm in widest diameter. This finding is new when   compared prior head CT performed on February 9, 2022    < end of copied text >  < from: CT Angio Neck Stroke Protocol w/ IV Cont (01.03.25 @ 08:10) >  CBF<30%: 0.0 mL  MAXIMUM TEMPERATURE> 6.0F: 0.0 mL  Mismatch volume: 0.0 mL  Mismatch ratio: None    Both distal common carotid, proximal internal and external carotid   arteries appear normal as well as both vertebral arteries. No significant   stenosis is seen.    Both distal internal carotid arteries appear normal    Hypoplastic right A1 segment is seen. Both anterior cerebral middle   cerebral basilar and posterior cerebral arteries appear normal without   evidence of an aneurysm or significant stenosis    The dural venous sinuses demonstrate normal enhancement    Evaluation soft tissue neck region appears normal    The visualized was both lung apices appear clear. Postop changes   involving the cervical spine are seen with degenerative changes.    IMPRESSION: Unremarkable CTA of neck and [place].    CT perfusion data as described above.    < end of copied text >      EEG  Summary:  Normal EEG in the awake / drowsy / asleep state(s).    Clinical Impression:  There were no epileptiform abnormalities recorded.

## 2025-01-07 NOTE — PROGRESS NOTE ADULT - ASSESSMENT
59-year-old male past medical history of A-fib on Xarelto, borderline diabetic, and hypertension, presents to the ED accompanied by wife with altered mental status.  Patient was last known normal 1030 last night.  As per wife, 6:30 AM this morning woke up unresponsive, wife states that patient was not responsive when asked to wake up to go move his call.  He also had an episode of incontinence.  In the ED noted with slurred speech.    AMS  - sp code stroke  - imaging reviewed  - neuro fu   - Passed speech and swallow    SDH  - nontraumatic  - fu repeat CT head reviewed  - restart  xarelto and monitor cbc   - neurosurgery fu appreciated    Afib  - telemonitor  - restarted  AC  - cards fu appreciated    dw pt and family at bedside  zechariah cards       
· Assessment	  59-year-old male past medical history of A-fib on Xarelto, borderline diabetic, and hypertension, presents to the ED accompanied by wife with altered mental status.  Patient was last known normal 1030 last night.  As per wife, 6:30 AM this morning woke up unresponsive, wife states that patient was not responsive when asked to wake up to go move his call.  He also had an episode of incontinence.  In the ED noted with slurred speech.    AMS  - sp code stroke  - imaging reviewed  - check MRI head  - neuro fu   - speech and swallow pending     SDH  - nontraumatic  - fu repeat CT head for this morning   - hold xarelto  - neurosurgery fu appreciated    Afib  - telemonitor  - hold AC  - cards fu appreciated  - given Kcentra for xarelto reversalwith plans for  non contrast CTH 4 hr and in AM 1/4. Holding Xareltro    dw pt and family at bedside  dw cards     
 59 RH M PMHx pre-DM, Afib on Xarelto, bariatric surgery 2015 presented as code stroke for altered mental status, found to have urinary incontience, also noted to have R facial? palsy and dysarthria. Confused on resident exam, now AAOx3, subtle LUE and LLE drift and mild L hemisensory deficit. R side intact.  CTH with small acute interhemispheic 1.8cm SDH, 4h scan stable  CTA neg  xalreto reversed with Kcentra  repeat scan deemed to be calcification, not hemorrhage  weakness improved - possible post ictal todds  EEG neg    Nontraumatic SDH - now deemed to be area of calcification  Encephalopathy w/ urinary incontinence - suspicious for seizure w/ post ictal todds  afib    - xarelto resumed  - MRI brain when able to further clarify and for possible seizure -> can be done as outpatient  - eeg neg, will not start AEDs for possible first lifetime seizure - unclear if true seizure, will further w.u as outaptient   - Cardiology following  - TTE unrevealing  - pt/ot  dc planning     Ro Peck DO  Vascular Neurology  Office [phone]  Available via [employer] Teams         
59-year-old male past medical history of A-fib on Xarelto, borderline diabetic, and hypertension, presents to the ED accompanied by wife with altered mental status.  Patient was last known normal 1030 last night.  As per wife, 6:30 AM this morning woke up unresponsive, wife states that patient was not responsive when asked to wake up to go move his call.  He also had an episode of incontinence.  In the ED noted with slurred speech.    AMS  - sp code stroke  - imaging reviewed  - neuro fu   - Passed speech and swallow    SDH  - repeat CT head reviewed  - restart  xarelto and monitor cbc   - neurosurgery fu appreciated  - MRI head pending  - check EEG     Afib  - telemonitor  - restarted  AC  - cards fu appreciated    dw pt and family at bedside  dw cards       
 59 RH M PMHx pre-DM, Afib on Xarelto, bariatric surgery 2015 presented as code stroke for altered mental status, found to have urinary incontience, also noted to have R facial? palsy and dysarthria. Confused on resident exam, now AAOx3, subtle LUE and LLE drift and mild L hemisensory deficit. R side intact.  CTH with small acute interhemispheic 1.8cm SDH, 4h scan stable  CTA neg  xalreto reversed with Kcentra  repeat scan deemed to be calcification, not hemorrhage  weakness improved - possible post ictal todds    Nontraumatic SDH - now deemed to be area of calcification  Encephalopathy w/ urinary incontinence - suspicious for seizure w/ post ictal todds  afib    - xarelto resumed  - MRI brain when able to further clarify and for possible seizure  - eeg pending  - Cardiology following  - TTE unrevealing  - pt/ot    Ro Peck DO  Vascular Neurology  Office [phone]  Available via [employer] Teams

## 2025-01-09 ENCOUNTER — OUTPATIENT (OUTPATIENT)
Dept: OUTPATIENT SERVICES | Facility: HOSPITAL | Age: 60
LOS: 1 days | End: 2025-01-09
Payer: COMMERCIAL

## 2025-01-09 ENCOUNTER — APPOINTMENT (OUTPATIENT)
Dept: RADIOLOGY | Facility: IMAGING CENTER | Age: 60
End: 2025-01-09
Payer: COMMERCIAL

## 2025-01-09 DIAGNOSIS — Z98.89 OTHER SPECIFIED POSTPROCEDURAL STATES: Chronic | ICD-10-CM

## 2025-01-09 DIAGNOSIS — Z98.84 BARIATRIC SURGERY STATUS: Chronic | ICD-10-CM

## 2025-01-09 DIAGNOSIS — Z98.890 OTHER SPECIFIED POSTPROCEDURAL STATES: Chronic | ICD-10-CM

## 2025-01-09 DIAGNOSIS — Z90.49 ACQUIRED ABSENCE OF OTHER SPECIFIED PARTS OF DIGESTIVE TRACT: Chronic | ICD-10-CM

## 2025-01-09 DIAGNOSIS — Z90.89 ACQUIRED ABSENCE OF OTHER ORGANS: Chronic | ICD-10-CM

## 2025-01-09 DIAGNOSIS — S20.212A CONTUSION OF LEFT FRONT WALL OF THORAX, INITIAL ENCOUNTER: ICD-10-CM

## 2025-01-09 PROCEDURE — 71100 X-RAY EXAM RIBS UNI 2 VIEWS: CPT

## 2025-01-09 PROCEDURE — 71100 X-RAY EXAM RIBS UNI 2 VIEWS: CPT | Mod: 26

## 2025-02-05 ENCOUNTER — APPOINTMENT (OUTPATIENT)
Dept: CARDIOLOGY | Facility: CLINIC | Age: 60
End: 2025-02-05

## 2025-02-21 PROBLEM — E78.5 HYPERLIPIDEMIA, UNSPECIFIED: Chronic | Status: ACTIVE | Noted: 2024-12-26

## 2025-02-21 PROBLEM — I63.9 CEREBRAL INFARCTION, UNSPECIFIED: Chronic | Status: ACTIVE | Noted: 2024-12-26

## 2025-02-22 ENCOUNTER — OUTPATIENT (OUTPATIENT)
Dept: OUTPATIENT SERVICES | Facility: HOSPITAL | Age: 60
LOS: 1 days | End: 2025-02-22
Payer: COMMERCIAL

## 2025-02-22 ENCOUNTER — APPOINTMENT (OUTPATIENT)
Dept: MRI IMAGING | Facility: CLINIC | Age: 60
End: 2025-02-22
Payer: COMMERCIAL

## 2025-02-22 DIAGNOSIS — Z90.89 ACQUIRED ABSENCE OF OTHER ORGANS: Chronic | ICD-10-CM

## 2025-02-22 DIAGNOSIS — G54.2 CERVICAL ROOT DISORDERS, NOT ELSEWHERE CLASSIFIED: ICD-10-CM

## 2025-02-22 DIAGNOSIS — Z98.89 OTHER SPECIFIED POSTPROCEDURAL STATES: Chronic | ICD-10-CM

## 2025-02-22 DIAGNOSIS — Z98.890 OTHER SPECIFIED POSTPROCEDURAL STATES: Chronic | ICD-10-CM

## 2025-02-22 DIAGNOSIS — Z90.49 ACQUIRED ABSENCE OF OTHER SPECIFIED PARTS OF DIGESTIVE TRACT: Chronic | ICD-10-CM

## 2025-02-22 DIAGNOSIS — Z98.84 BARIATRIC SURGERY STATUS: Chronic | ICD-10-CM

## 2025-02-22 PROCEDURE — 72141 MRI NECK SPINE W/O DYE: CPT

## 2025-02-22 PROCEDURE — 72141 MRI NECK SPINE W/O DYE: CPT | Mod: 26

## 2025-04-28 ENCOUNTER — APPOINTMENT (OUTPATIENT)
Dept: VASCULAR SURGERY | Facility: CLINIC | Age: 60
End: 2025-04-28

## 2025-05-30 ENCOUNTER — APPOINTMENT (OUTPATIENT)
Dept: ORTHOPEDIC SURGERY | Facility: CLINIC | Age: 60
End: 2025-05-30

## 2025-05-30 VITALS — HEIGHT: 63 IN | BODY MASS INDEX: 30.12 KG/M2 | WEIGHT: 170 LBS

## 2025-05-30 DIAGNOSIS — M17.11 UNILATERAL PRIMARY OSTEOARTHRITIS, RIGHT KNEE: ICD-10-CM

## 2025-05-30 PROCEDURE — 99203 OFFICE O/P NEW LOW 30 MIN: CPT

## 2025-05-30 PROCEDURE — 73562 X-RAY EXAM OF KNEE 3: CPT | Mod: RT

## 2025-06-05 PROBLEM — M17.11 PRIMARY LOCALIZED OSTEOARTHRITIS OF RIGHT KNEE: Status: ACTIVE | Noted: 2025-06-05

## 2025-07-14 NOTE — ED PROVIDER NOTE - NS ED ATTENDING NAME FT
Dr. Lezama

## 2025-07-25 ENCOUNTER — APPOINTMENT (OUTPATIENT)
Dept: TRAUMA SURGERY | Facility: HOSPITAL | Age: 60
End: 2025-07-25
Payer: COMMERCIAL

## 2025-07-25 VITALS
BODY MASS INDEX: 29.06 KG/M2 | WEIGHT: 164 LBS | TEMPERATURE: 98.4 F | DIASTOLIC BLOOD PRESSURE: 66 MMHG | HEART RATE: 76 BPM | SYSTOLIC BLOOD PRESSURE: 111 MMHG | HEIGHT: 63 IN

## 2025-07-25 PROCEDURE — 99203 OFFICE O/P NEW LOW 30 MIN: CPT

## 2025-09-20 ENCOUNTER — NON-APPOINTMENT (OUTPATIENT)
Age: 60
End: 2025-09-20

## (undated) DEVICE — TUBING OLYMPUS INSUFFLATION

## (undated) DEVICE — PACK GENERAL LAPAROSCOPY

## (undated) DEVICE — ANESTHESIA CIRCUIT ADULT HMEF

## (undated) DEVICE — WARMING BLANKET FULL UNDERBODY

## (undated) DEVICE — TROCAR COVIDIEN BLUNT TIP HASSAN 10MM

## (undated) DEVICE — CANISTER DISPOSABLE THIN WALL 3000CC

## (undated) DEVICE — SUT VICRYL 0 27" UR-6

## (undated) DEVICE — ELCTR GROUNDING PAD ADULT COVIDIEN

## (undated) DEVICE — BASIN SET SINGLE

## (undated) DEVICE — TROCAR COVIDIEN VERSAPORT BLADELESS OPTICAL 5MM STANDARD

## (undated) DEVICE — LIGASURE MARYLAND 37CM

## (undated) DEVICE — VENODYNE/SCD SLEEVE CALF MEDIUM

## (undated) DEVICE — SOL IRR POUR NS 0.9% 500ML